# Patient Record
Sex: MALE | Race: ASIAN | NOT HISPANIC OR LATINO | Employment: OTHER | URBAN - METROPOLITAN AREA
[De-identification: names, ages, dates, MRNs, and addresses within clinical notes are randomized per-mention and may not be internally consistent; named-entity substitution may affect disease eponyms.]

---

## 2017-01-01 ENCOUNTER — OFFICE VISIT - HEALTHEAST (OUTPATIENT)
Dept: PULMONOLOGY | Facility: OTHER | Age: 82
End: 2017-01-01

## 2017-01-01 ENCOUNTER — COMMUNICATION - HEALTHEAST (OUTPATIENT)
Dept: NURSING | Facility: CLINIC | Age: 82
End: 2017-01-01

## 2017-01-01 ENCOUNTER — RECORDS - HEALTHEAST (OUTPATIENT)
Dept: ADMINISTRATIVE | Facility: OTHER | Age: 82
End: 2017-01-01

## 2017-01-01 ENCOUNTER — RECORDS - HEALTHEAST (OUTPATIENT)
Dept: GENERAL RADIOLOGY | Facility: CLINIC | Age: 82
End: 2017-01-01

## 2017-01-01 ENCOUNTER — OFFICE VISIT - HEALTHEAST (OUTPATIENT)
Dept: FAMILY MEDICINE | Facility: CLINIC | Age: 82
End: 2017-01-01

## 2017-01-01 ENCOUNTER — HOSPITAL ENCOUNTER (OUTPATIENT)
Dept: NUCLEAR MEDICINE | Facility: HOSPITAL | Age: 82
Discharge: HOME | End: 2017-08-17
Attending: INTERNAL MEDICINE

## 2017-01-01 ENCOUNTER — COMMUNICATION - HEALTHEAST (OUTPATIENT)
Dept: CARDIOLOGY | Facility: CLINIC | Age: 82
End: 2017-01-01

## 2017-01-01 ENCOUNTER — AMBULATORY - HEALTHEAST (OUTPATIENT)
Dept: FAMILY MEDICINE | Facility: CLINIC | Age: 82
End: 2017-01-01

## 2017-01-01 ENCOUNTER — COMMUNICATION - HEALTHEAST (OUTPATIENT)
Dept: FAMILY MEDICINE | Facility: CLINIC | Age: 82
End: 2017-01-01

## 2017-01-01 ENCOUNTER — OFFICE VISIT (OUTPATIENT)
Dept: OPHTHALMOLOGY | Facility: CLINIC | Age: 82
End: 2017-01-01
Attending: OPHTHALMOLOGY
Payer: COMMERCIAL

## 2017-01-01 ENCOUNTER — TRANSFERRED RECORDS (OUTPATIENT)
Dept: HEALTH INFORMATION MANAGEMENT | Facility: CLINIC | Age: 82
End: 2017-01-01

## 2017-01-01 ENCOUNTER — HOSPITAL ENCOUNTER (OUTPATIENT)
Dept: CARDIOLOGY | Facility: HOSPITAL | Age: 82
Discharge: HOME | End: 2017-08-17
Attending: INTERNAL MEDICINE

## 2017-01-01 ENCOUNTER — OFFICE VISIT - HEALTHEAST (OUTPATIENT)
Dept: CARDIOLOGY | Facility: CLINIC | Age: 82
End: 2017-01-01

## 2017-01-01 ENCOUNTER — AMBULATORY - HEALTHEAST (OUTPATIENT)
Dept: NURSING | Facility: CLINIC | Age: 82
End: 2017-01-01

## 2017-01-01 DIAGNOSIS — Z94.7 PENETRATING KERATOPLASTY GRAFT IN PLACE: ICD-10-CM

## 2017-01-01 DIAGNOSIS — R10.9 FLANK PAIN: ICD-10-CM

## 2017-01-01 DIAGNOSIS — Z09 HOSPITAL DISCHARGE FOLLOW-UP: ICD-10-CM

## 2017-01-01 DIAGNOSIS — I25.118 CORONARY ARTERY DISEASE OF NATIVE ARTERY OF NATIVE HEART WITH STABLE ANGINA PECTORIS (H): ICD-10-CM

## 2017-01-01 DIAGNOSIS — J44.9 COPD (CHRONIC OBSTRUCTIVE PULMONARY DISEASE) (H): ICD-10-CM

## 2017-01-01 DIAGNOSIS — H30.91 POSTERIOR UVEITIS, RIGHT EYE: ICD-10-CM

## 2017-01-01 DIAGNOSIS — E11.21 TYPE 2 DIABETES MELLITUS WITH DIABETIC NEPHROPATHY, WITHOUT LONG-TERM CURRENT USE OF INSULIN (H): ICD-10-CM

## 2017-01-01 DIAGNOSIS — D17.9 LIPOMA: ICD-10-CM

## 2017-01-01 DIAGNOSIS — C22.0 HCC (HEPATOCELLULAR CARCINOMA) (H): ICD-10-CM

## 2017-01-01 DIAGNOSIS — M54.50 LOW BACK PAIN: ICD-10-CM

## 2017-01-01 DIAGNOSIS — D64.9 ANEMIA: ICD-10-CM

## 2017-01-01 DIAGNOSIS — K21.9 GERD (GASTROESOPHAGEAL REFLUX DISEASE): ICD-10-CM

## 2017-01-01 DIAGNOSIS — M1A.0290 CHRONIC GOUT OF ELBOW, UNSPECIFIED CAUSE, UNSPECIFIED LATERALITY: ICD-10-CM

## 2017-01-01 DIAGNOSIS — J84.10 PULMONARY FIBROSIS (H): ICD-10-CM

## 2017-01-01 DIAGNOSIS — J44.89 ASTHMA-COPD OVERLAP SYNDROME (H): ICD-10-CM

## 2017-01-01 DIAGNOSIS — Z01.818 PREOP EXAMINATION: ICD-10-CM

## 2017-01-01 DIAGNOSIS — Z00.00 HEALTHCARE MAINTENANCE: ICD-10-CM

## 2017-01-01 DIAGNOSIS — E78.5 HYPERLIPIDEMIA: ICD-10-CM

## 2017-01-01 DIAGNOSIS — Z94.7 H/O CORNEA TRANSPLANT: ICD-10-CM

## 2017-01-01 DIAGNOSIS — Z94.7 H/O CORNEA TRANSPLANT: Primary | ICD-10-CM

## 2017-01-01 DIAGNOSIS — Z87.09 HISTORY OF COPD: ICD-10-CM

## 2017-01-01 DIAGNOSIS — R10.9 UNSPECIFIED ABDOMINAL PAIN: ICD-10-CM

## 2017-01-01 DIAGNOSIS — I25.10 CORONARY ARTERY DISEASE: ICD-10-CM

## 2017-01-01 DIAGNOSIS — M1A.9XX1 GOUT TOPHI: ICD-10-CM

## 2017-01-01 DIAGNOSIS — R00.1 BRADYCARDIA: ICD-10-CM

## 2017-01-01 DIAGNOSIS — E03.9 HYPOTHYROID: ICD-10-CM

## 2017-01-01 DIAGNOSIS — Z96.1 PSEUDOPHAKIA: ICD-10-CM

## 2017-01-01 DIAGNOSIS — J93.83 SPONTANEOUS PNEUMOTHORAX: ICD-10-CM

## 2017-01-01 DIAGNOSIS — I25.10 ATHEROSCLEROSIS OF NATIVE CORONARY ARTERY OF NATIVE HEART WITHOUT ANGINA PECTORIS: ICD-10-CM

## 2017-01-01 DIAGNOSIS — J32.9 CHRONIC RHINOSINUSITIS: ICD-10-CM

## 2017-01-01 DIAGNOSIS — Z96.1 IOL PRESENT IN ANTERIOR CHAMBER: ICD-10-CM

## 2017-01-01 DIAGNOSIS — N18.32 CKD STAGE G3B/A1, GFR 30-44 AND ALBUMIN CREATININE RATIO <30 MG/G (H): ICD-10-CM

## 2017-01-01 DIAGNOSIS — E03.4 HYPOTHYROIDISM DUE TO ACQUIRED ATROPHY OF THYROID: ICD-10-CM

## 2017-01-01 DIAGNOSIS — B19.10 CIRRHOSIS OF LIVER DUE TO HEPATITIS B (H): ICD-10-CM

## 2017-01-01 DIAGNOSIS — K74.60 CIRRHOSIS OF LIVER DUE TO HEPATITIS B (H): ICD-10-CM

## 2017-01-01 DIAGNOSIS — H54.7 DECREASED VISION: ICD-10-CM

## 2017-01-01 DIAGNOSIS — E78.00 HYPERCHOLESTEREMIA: ICD-10-CM

## 2017-01-01 DIAGNOSIS — Z94.7 PENETRATING KERATOPLASTY GRAFT IN PLACE: Primary | ICD-10-CM

## 2017-01-01 DIAGNOSIS — Z71.89 COUNSELING AND COORDINATION OF CARE: ICD-10-CM

## 2017-01-01 DIAGNOSIS — F32.A DEPRESSION, UNSPECIFIED DEPRESSION TYPE: ICD-10-CM

## 2017-01-01 DIAGNOSIS — J45.21 MILD INTERMITTENT ASTHMA WITH ACUTE EXACERBATION: ICD-10-CM

## 2017-01-01 LAB
ALT SERPL W P-5'-P-CCNC: 19 U/L (ref 0–45)
ATRIAL RATE - MUSE: 43 BPM
CHOLEST SERPL-MCNC: 79 MG/DL
CV STRESS CURRENT BP HE: NORMAL
CV STRESS CURRENT HR HE: 61
CV STRESS CURRENT HR HE: 62
CV STRESS CURRENT HR HE: 62
CV STRESS CURRENT HR HE: 63
CV STRESS CURRENT HR HE: 64
CV STRESS CURRENT HR HE: 64
CV STRESS CURRENT HR HE: 65
CV STRESS CURRENT HR HE: 67
CV STRESS CURRENT HR HE: 68
CV STRESS CURRENT HR HE: 68
CV STRESS CURRENT HR HE: 69
CV STRESS CURRENT HR HE: 69
CV STRESS CURRENT HR HE: 70
CV STRESS CURRENT HR HE: 72
CV STRESS CURRENT HR HE: 74
CV STRESS CURRENT HR HE: 74
CV STRESS DEVIATION TIME HE: NORMAL
CV STRESS ECHO PERCENT HR HE: NORMAL
CV STRESS EXERCISE STAGE HE: NORMAL
CV STRESS FINAL RESTING BP HE: NORMAL
CV STRESS FINAL RESTING HR HE: 64
CV STRESS MAX HR HE: 75
CV STRESS MAX TREADMILL GRADE HE: 0
CV STRESS MAX TREADMILL SPEED HE: 0
CV STRESS PEAK DIA BP HE: NORMAL
CV STRESS PEAK SYS BP HE: NORMAL
CV STRESS PHASE HE: NORMAL
CV STRESS PROTOCOL HE: NORMAL
CV STRESS RESTING PT POSITION HE: NORMAL
CV STRESS ST DEVIATION AMOUNT HE: NORMAL
CV STRESS ST DEVIATION ELEVATION HE: NORMAL
CV STRESS ST EVELATION AMOUNT HE: NORMAL
CV STRESS TEST TYPE HE: NORMAL
CV STRESS TOTAL STAGE TIME MIN 1 HE: NORMAL
DIASTOLIC BLOOD PRESSURE - MUSE: NORMAL MMHG
FASTING STATUS PATIENT QL REPORTED: YES
HBA1C MFR BLD: 6.2 % (ref 3.5–6)
HBA1C MFR BLD: 6.2 % (ref 3.5–6)
HDLC SERPL-MCNC: 36 MG/DL
INTERPRETATION ECG - MUSE: NORMAL
LDLC SERPL CALC-MCNC: 42 MG/DL
NUC STRESS EJECTION FRACTION: 52 %
P AXIS - MUSE: 24 DEGREES
PR INTERVAL - MUSE: 136 MS
QRS DURATION - MUSE: 94 MS
QT - MUSE: 502 MS
QTC - MUSE: 424 MS
R AXIS - MUSE: 62 DEGREES
STRESS ECHO BASELINE BP: NORMAL
STRESS ECHO BASELINE HR: 56
STRESS ECHO CALCULATED PERCENT HR: 55 %
STRESS ECHO LAST STRESS BP: NORMAL
STRESS ECHO LAST STRESS HR: 65
SYSTOLIC BLOOD PRESSURE - MUSE: NORMAL MMHG
T AXIS - MUSE: 93 DEGREES
VENTRICULAR RATE- MUSE: 43 BPM

## 2017-01-01 PROCEDURE — 99212 OFFICE O/P EST SF 10 MIN: CPT | Mod: ZF

## 2017-01-01 PROCEDURE — 99213 OFFICE O/P EST LOW 20 MIN: CPT | Mod: ZF

## 2017-01-01 PROCEDURE — 92134 CPTRZ OPH DX IMG PST SGM RTA: CPT | Mod: ZF | Performed by: OPHTHALMOLOGY

## 2017-01-01 PROCEDURE — 92133 CPTRZD OPH DX IMG PST SGM ON: CPT | Mod: ZF | Performed by: OPHTHALMOLOGY

## 2017-01-01 PROCEDURE — T1013 SIGN LANG/ORAL INTERPRETER: HCPCS | Mod: U3,ZF

## 2017-01-01 PROCEDURE — 99214 OFFICE O/P EST MOD 30 MIN: CPT | Mod: 25,ZF

## 2017-01-01 RX ORDER — PREDNISOLONE ACETATE 10 MG/ML
1 SUSPENSION/ DROPS OPHTHALMIC 4 TIMES DAILY
Qty: 1 BOTTLE | Refills: 11 | Status: SHIPPED | OUTPATIENT
Start: 2017-01-01 | End: 2018-01-01

## 2017-01-01 ASSESSMENT — EXTERNAL EXAM - RIGHT EYE
OD_EXAM: NORMAL

## 2017-01-01 ASSESSMENT — TONOMETRY
OD_IOP_MMHG: 09
IOP_METHOD: ICARE
IOP_METHOD: ICARE
OS_IOP_MMHG: 13
OD_IOP_MMHG: 05
OS_IOP_MMHG: 16
IOP_METHOD: TONOPEN
IOP_METHOD: ICARE
OS_IOP_MMHG: 12
OD_IOP_MMHG: 09
OS_IOP_MMHG: 11
OD_IOP_MMHG: 16

## 2017-01-01 ASSESSMENT — VISUAL ACUITY
METHOD: SNELLEN - LINEAR
OS_PH_SC: 20/60
OS_PH_SC: 20/60
OD_SC: CF @ 2'
METHOD: SNELLEN - LINEAR
OS_SC: 20/60
OD_SC: CF @ 2'
OS_SC: 20/80
OD_SC: 20/500
METHOD: SNELLEN - LINEAR
OD_PH_SC: 20/200
OD_PH_SC: 20/150
OS_PH_SC: 20/40
OS_PH_SC: 20/40
OS_SC: 20/70
METHOD: SNELLEN - LINEAR
OD_PH_SC: 20/300
OS_SC: 20/80
OD_SC: CF @ 3'

## 2017-01-01 ASSESSMENT — PACHYMETRY
OD_CT(UM): 613
OS_CT(UM): 557

## 2017-01-01 ASSESSMENT — CONF VISUAL FIELD
OD_SUPERIOR_TEMPORAL_RESTRICTION: 3
OD_INFERIOR_TEMPORAL_RESTRICTION: 3
OD_INFERIOR_NASAL_RESTRICTION: 3
OS_SUPERIOR_NASAL_RESTRICTION: 3
OD_INFERIOR_NASAL_RESTRICTION: 3
OS_INFERIOR_TEMPORAL_RESTRICTION: 3
OS_SUPERIOR_TEMPORAL_RESTRICTION: 3
OD_SUPERIOR_NASAL_RESTRICTION: 3
OD_INFERIOR_TEMPORAL_RESTRICTION: 3
OS_SUPERIOR_TEMPORAL_RESTRICTION: 3
OS_SUPERIOR_NASAL_RESTRICTION: 3
OD_SUPERIOR_TEMPORAL_RESTRICTION: 3
OS_INFERIOR_NASAL_RESTRICTION: 3
OD_SUPERIOR_NASAL_RESTRICTION: 3
OS_INFERIOR_TEMPORAL_RESTRICTION: 3
OS_INFERIOR_NASAL_RESTRICTION: 3

## 2017-01-01 ASSESSMENT — REFRACTION_WEARINGRX
OS_AXIS: 175
OS_CYLINDER: +1.00
OD_AXIS: 040
OD_CYLINDER: +1.25
OS_SPHERE: +1.00
OD_SPHERE: -2.50

## 2017-01-01 ASSESSMENT — EXTERNAL EXAM - LEFT EYE
OS_EXAM: MILD BROW PTOSIS

## 2017-01-01 ASSESSMENT — MIFFLIN-ST. JEOR
SCORE: 998.78
SCORE: 985.17
SCORE: 1003.31

## 2017-01-06 ENCOUNTER — COMMUNICATION - HEALTHEAST (OUTPATIENT)
Dept: FAMILY MEDICINE | Facility: CLINIC | Age: 82
End: 2017-01-06

## 2017-01-06 DIAGNOSIS — J44.9 COPD (CHRONIC OBSTRUCTIVE PULMONARY DISEASE) (H): ICD-10-CM

## 2017-01-09 ENCOUNTER — OFFICE VISIT - HEALTHEAST (OUTPATIENT)
Dept: NURSING | Facility: CLINIC | Age: 82
End: 2017-01-09

## 2017-01-09 DIAGNOSIS — M10.9 GOUT: ICD-10-CM

## 2017-01-09 DIAGNOSIS — I50.22 CHRONIC SYSTOLIC HEART FAILURE (H): ICD-10-CM

## 2017-01-09 DIAGNOSIS — F32.A DEPRESSION, UNSPECIFIED DEPRESSION TYPE: ICD-10-CM

## 2017-01-09 DIAGNOSIS — J44.1 CHRONIC OBSTRUCTIVE PULMONARY DISEASE WITH ACUTE EXACERBATION (H): ICD-10-CM

## 2017-01-09 DIAGNOSIS — N18.30 CHRONIC KIDNEY DISEASE, STAGE III (MODERATE) (H): ICD-10-CM

## 2017-01-09 DIAGNOSIS — E03.9 HYPOTHYROIDISM, UNSPECIFIED TYPE: ICD-10-CM

## 2017-01-09 LAB
CREAT SERPL-MCNC: 1.68 MG/DL (ref 0.7–1.3)
GFR SERPL CREATININE-BSD FRML MDRD: 39 ML/MIN/1.73M2

## 2017-01-10 ENCOUNTER — COMMUNICATION - HEALTHEAST (OUTPATIENT)
Dept: FAMILY MEDICINE | Facility: CLINIC | Age: 82
End: 2017-01-10

## 2017-01-11 ENCOUNTER — AMBULATORY - HEALTHEAST (OUTPATIENT)
Dept: FAMILY MEDICINE | Facility: CLINIC | Age: 82
End: 2017-01-11

## 2017-01-11 DIAGNOSIS — E03.9 HYPOTHYROIDISM: ICD-10-CM

## 2017-01-18 ENCOUNTER — COMMUNICATION - HEALTHEAST (OUTPATIENT)
Dept: FAMILY MEDICINE | Facility: CLINIC | Age: 82
End: 2017-01-18

## 2017-01-25 ENCOUNTER — AMBULATORY - HEALTHEAST (OUTPATIENT)
Dept: LAB | Facility: CLINIC | Age: 82
End: 2017-01-25

## 2017-01-25 ENCOUNTER — AMBULATORY - HEALTHEAST (OUTPATIENT)
Dept: PHYSICAL MEDICINE AND REHAB | Facility: CLINIC | Age: 82
End: 2017-01-25

## 2017-01-25 DIAGNOSIS — E03.9 HYPOTHYROIDISM: ICD-10-CM

## 2017-01-30 ENCOUNTER — OFFICE VISIT - HEALTHEAST (OUTPATIENT)
Dept: NURSING | Facility: CLINIC | Age: 82
End: 2017-01-30

## 2017-01-30 DIAGNOSIS — E55.9 VITAMIN D DEFICIENCY: ICD-10-CM

## 2017-01-30 DIAGNOSIS — J44.1 CHRONIC OBSTRUCTIVE PULMONARY DISEASE WITH ACUTE EXACERBATION (H): ICD-10-CM

## 2017-01-30 DIAGNOSIS — K21.9 GASTROESOPHAGEAL REFLUX DISEASE WITHOUT ESOPHAGITIS: ICD-10-CM

## 2017-01-30 DIAGNOSIS — I50.22 CHRONIC SYSTOLIC HEART FAILURE (H): ICD-10-CM

## 2017-01-30 DIAGNOSIS — N18.30 CHRONIC KIDNEY DISEASE, STAGE III (MODERATE) (H): ICD-10-CM

## 2017-01-30 DIAGNOSIS — M10.9 GOUT: ICD-10-CM

## 2017-02-22 ENCOUNTER — RECORDS - HEALTHEAST (OUTPATIENT)
Dept: ADMINISTRATIVE | Facility: OTHER | Age: 82
End: 2017-02-22

## 2017-02-24 ENCOUNTER — COMMUNICATION - HEALTHEAST (OUTPATIENT)
Dept: FAMILY MEDICINE | Facility: CLINIC | Age: 82
End: 2017-02-24

## 2017-03-09 ENCOUNTER — COMMUNICATION - HEALTHEAST (OUTPATIENT)
Dept: FAMILY MEDICINE | Facility: CLINIC | Age: 82
End: 2017-03-09

## 2017-03-09 DIAGNOSIS — I10 HTN (HYPERTENSION): ICD-10-CM

## 2017-03-13 ENCOUNTER — RECORDS - HEALTHEAST (OUTPATIENT)
Dept: ADMINISTRATIVE | Facility: OTHER | Age: 82
End: 2017-03-13

## 2017-03-15 ENCOUNTER — COMMUNICATION - HEALTHEAST (OUTPATIENT)
Dept: FAMILY MEDICINE | Facility: CLINIC | Age: 82
End: 2017-03-15

## 2017-03-24 ENCOUNTER — OFFICE VISIT - HEALTHEAST (OUTPATIENT)
Dept: NURSING | Facility: CLINIC | Age: 82
End: 2017-03-24

## 2017-03-24 DIAGNOSIS — M10.9 GOUT: ICD-10-CM

## 2017-03-24 DIAGNOSIS — K74.60 CIRRHOSIS OF LIVER DUE TO HEPATITIS B (H): ICD-10-CM

## 2017-03-24 DIAGNOSIS — B19.10 CIRRHOSIS OF LIVER DUE TO HEPATITIS B (H): ICD-10-CM

## 2017-03-24 DIAGNOSIS — M54.2 CERVICALGIA: ICD-10-CM

## 2017-03-24 DIAGNOSIS — I50.22 CHRONIC SYSTOLIC HEART FAILURE (H): ICD-10-CM

## 2017-03-24 DIAGNOSIS — J44.1 CHRONIC OBSTRUCTIVE PULMONARY DISEASE WITH ACUTE EXACERBATION (H): ICD-10-CM

## 2017-03-24 DIAGNOSIS — R33.9 URINARY RETENTION: ICD-10-CM

## 2017-03-27 ENCOUNTER — COMMUNICATION - HEALTHEAST (OUTPATIENT)
Dept: NURSING | Facility: CLINIC | Age: 82
End: 2017-03-27

## 2017-03-27 DIAGNOSIS — M10.00 IDIOPATHIC GOUT, UNSPECIFIED CHRONICITY, UNSPECIFIED SITE: ICD-10-CM

## 2017-03-31 ENCOUNTER — COMMUNICATION - HEALTHEAST (OUTPATIENT)
Dept: NURSING | Facility: CLINIC | Age: 82
End: 2017-03-31

## 2017-04-02 ENCOUNTER — COMMUNICATION - HEALTHEAST (OUTPATIENT)
Dept: FAMILY MEDICINE | Facility: CLINIC | Age: 82
End: 2017-04-02

## 2017-04-04 ENCOUNTER — COMMUNICATION - HEALTHEAST (OUTPATIENT)
Dept: FAMILY MEDICINE | Facility: CLINIC | Age: 82
End: 2017-04-04

## 2017-04-04 ENCOUNTER — OFFICE VISIT (OUTPATIENT)
Dept: OPHTHALMOLOGY | Facility: CLINIC | Age: 82
End: 2017-04-04
Attending: OPHTHALMOLOGY
Payer: COMMERCIAL

## 2017-04-04 DIAGNOSIS — H54.7 DECREASED VISION: Primary | ICD-10-CM

## 2017-04-04 DIAGNOSIS — R52 PAIN: ICD-10-CM

## 2017-04-04 PROCEDURE — 92025 CPTRIZED CORNEAL TOPOGRAPHY: CPT | Mod: ZF | Performed by: OPHTHALMOLOGY

## 2017-04-04 PROCEDURE — 99212 OFFICE O/P EST SF 10 MIN: CPT | Mod: ZF

## 2017-04-04 PROCEDURE — 92015 DETERMINE REFRACTIVE STATE: CPT | Mod: ZF

## 2017-04-04 ASSESSMENT — VISUAL ACUITY
OD_SC: CF @ 3'
OS_PH_CC: 20/60
OS_SC: 20/150
METHOD: SNELLEN - LINEAR

## 2017-04-04 ASSESSMENT — CONF VISUAL FIELD
OS_SUPERIOR_TEMPORAL_RESTRICTION: 3
OS_INFERIOR_TEMPORAL_RESTRICTION: 3
OS_SUPERIOR_NASAL_RESTRICTION: 3
OD_INFERIOR_TEMPORAL_RESTRICTION: 3
OD_SUPERIOR_NASAL_RESTRICTION: 3
OD_INFERIOR_NASAL_RESTRICTION: 3
OD_SUPERIOR_TEMPORAL_RESTRICTION: 3
OS_INFERIOR_NASAL_RESTRICTION: 3

## 2017-04-04 ASSESSMENT — TONOMETRY
OS_IOP_MMHG: 10
IOP_METHOD: ICARE
OD_IOP_MMHG: 10

## 2017-04-04 ASSESSMENT — REFRACTION_MANIFEST
OS_ADD: +3.00
OS_AXIS: 020
OS_SPHERE: +1.25
OS_CYLINDER: +1.25

## 2017-04-04 ASSESSMENT — EXTERNAL EXAM - RIGHT EYE: OD_EXAM: NORMAL

## 2017-04-04 ASSESSMENT — EXTERNAL EXAM - LEFT EYE: OS_EXAM: MILD BROW PTOSIS

## 2017-04-04 NOTE — NURSING NOTE
Chief Complaints and History of Present Illnesses   Patient presents with     Follow Up For     s/p Corneal transplant rejection (Primary Dx);      HPI    Affected eye(s):  Both   Symptoms:     Blurred vision   Decreased vision   Floaters   No flashes   Foreign body sensation   Dryness      Duration:  7 months   Frequency:  Constant       Do you have eye pain now?:  No      Comments:  Pt. Stated decreased vision both eyes over the last 7 months.  Timolol last used 5:00 PM   A1C  ?  BS: 150-200      Heron Frausto  10:48 AM April 4, 2017

## 2017-04-04 NOTE — MR AVS SNAPSHOT
After Visit Summary   2017    Evelio Odell    MRN: [id number]           Patient Information     Date Of Birth          2/15/1934        Visit Information        Provider Department      2017 10:15 AM Endy Harkins MD; [facility]        Today's Diagnoses     Decreased vision    -  1       Follow-ups after your visit        Your next 10 appointments already scheduled     May 02, 2017  9:45 AM CDT   RETURN CORNEA with Endy Harkins MD   Eye Clinic ([facility])    [address]  [address]  [address] 9[address]  [facility] [address]   [phone]              Future tests that were ordered for you today     Open Future Orders        Priority Expected Expires Ordered    OCT Retina Spectralis OU (both eyes) Routine  10/4/2018 2017            Who to contact     Please call your clinic at [phone] to:    Ask questions about your health    Make or cancel appointments    Discuss your medicines    Learn about your test results    Speak to your doctor   If you have compliments or concerns about an experience at your clinic, or if you wish to file a complaint, please contact [facility] Patient Relations at [phone] or email us at [email or web address]         Additional Information About Your Visit        MyChart Information     SparkWordst is an electronic gateway that provides easy, online access to your medical records. With Ryzing, you can request a clinic appointment, read your test results, renew a prescription or communicate with your care team.     To sign up for SparkWordst visit the website at [email or web address]   You will be asked to enter the access code listed below, as well as some personal information. Please follow the directions to create your username and password.     Your access code is: [email or web address]  Expires: 2017 12:54 PM     Your access code will  in 90 days.  If you need help or a new code, please contact your [facility] or call [phone] for assistance.        Care EveryWhere ID     This is your Care EveryWhere ID. This could be used by other organizations to access your [place] medical records  [id number]         Blood Pressure from Last 3 Encounters:   02/19/14 154/75   10/01/13 (!) 151/92    Weight from Last 3 Encounters:   02/19/14 56.7 kg (125 lb)   10/01/13 59 kg (130 lb)              We Performed the Following     Corneal Topography OU (both eyes)        Primary Care Provider Office Phone # Fax #    [facility] [phone]       [address] [place] [address]        Thank you!     Thank you for choosing EYE CLINIC  for your care. Our goal is always to provide you with excellent care. Hearing back from our patients is one way we can continue to improve our services. Please take a few minutes to complete the written survey that you may receive in the mail after your visit with us. Thank you!             Your Updated Medication List - Protect others around you: Learn how to safely use, store and throw away your medicines at www.disposemymeds.org.          This list is accurate as of: 4/4/17  1:11 PM.  Always use your most recent med list.                   Brand Name Dispense Instructions for use    albuterol 108 (90 BASE) MCG/ACT Inhaler    PROAIR HFA/PROVENTIL HFA/VENTOLIN HFA     Inhale 2 puffs into the lungs every 4 hours as needed       artificial tears Oint ophthalmic ointment     1 Tube    Place 1 g into both eyes At Bedtime       brimonidine 0.2 % ophthalmic solution    ALPHAGAN    1 Bottle    Place 1 drop into the right eye 2 times daily       budesonide-formoterol 80-4.5 MCG/ACT Inhaler    SYMBICORT     Inhale 2 puffs into the lungs 2 times daily       COSOPT OP          dorzolamide 2 % ophthalmic solution    TRUSOPT    1 Bottle    Place 1 drop into the right eye 2 times daily        flunisolide 25 MCG/ACT (0.025%) Soln spray    NASALIDE    1 Bottle    2 sprays by Both Nostrils route every 12 hours.       FUROSEMIDE PO      Take 40 mg by mouth daily       HYDROcodone-acetaminophen 5-325 MG per tablet    NORCO    10 tablet    Take 1 tablet by mouth every 6 hours as needed for pain Maximum of 4000 mg of acetaminophen in 24 hours.       LEVOTHYROXINE SODIUM PO      Take 50 mcg by mouth daily       LISINOPRIL PO      Take 10 mg by mouth daily       metoprolol 25 MG tablet    LOPRESSOR     Take 25 mg by mouth daily       NITROSTAT SL      Place 0.4 mg under the tongue every 5 minutes as needed       OMEPRAZOLE PO      Take 40 mg by mouth every morning       polyvinyl alcohol 1.4 % ophthalmic solution    LIQUITEARS    15 mL    Place 1 drop into both eyes 4 times daily       prednisoLONE acetate 1 % ophthalmic susp    PRED FORTE    1 Bottle    Place 1 drop into the right eye 2 times daily       tenofovir 300 MG tablet    VIREAD     Take 300 mg by mouth daily       UNKNOWN TO PATIENT      Breathing, gout, high blood pressure medications

## 2017-04-04 NOTE — PROGRESS NOTES
CC: Follow-up PKP, Right Eye     HPI: Patient with hx of previous PKP OD and ACIOL. 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 S/P PKP, Right Eye     - history of presumed keratitis with severe ulceration with subsequent PKP 2013   - subsequent ACIOL 2014     - active rejection noted 6/14 was increased to PredForte Q1HR and started on Cosopt for increased intraocular pressure   - edema / keratitic precipitates / stromal edema continue to have resolved    Acuity is still decreased  galilea stable   Graft mild edema    rec increase Predforte  Frequency  Return to clinic 2-3 weeks, reassess    May need repeat penetrating keratoplasty (PK)      ~~~~~~~~~~~~~~~~~~~~~~~~~~~~~~~~~~~~~~~~~~~~~~~~~~~~~~~~~~~~~~~~    Complete documentation of historical and exam elements from today's encounter can be found in the full encounter summary report (not reduplicated in this progress note). I personally obtained the chief complaint(s) and history of present illness.  I confirmed and edited as necessary the review of systems, past medical/surgical history, family history, social history, and examination findings as documented by others.  I examined the patient myself, and I personally reviewed the relevant tests, images, and reports as documented above. I formulated and edited as necessary the assessment and plan and discussed the  findings and management plan with the patient and family.     Endy Harkins MD, MA  Director, Cornea & Anterior Segment  [facility] Department of Ophthalmology & Visual Neuroscience

## 2017-04-05 ENCOUNTER — RECORDS - HEALTHEAST (OUTPATIENT)
Dept: ADMINISTRATIVE | Facility: OTHER | Age: 82
End: 2017-04-05

## 2017-04-05 ENCOUNTER — TELEPHONE (OUTPATIENT)
Dept: OPHTHALMOLOGY | Facility: CLINIC | Age: 82
End: 2017-04-05

## 2017-04-05 DIAGNOSIS — Z94.7 PENETRATING KERATOPLASTY GRAFT IN PLACE: ICD-10-CM

## 2017-04-05 RX ORDER — DORZOLAMIDE HYDROCHLORIDE AND TIMOLOL MALEATE 20; 5 MG/ML; MG/ML
1 SOLUTION/ DROPS OPHTHALMIC 2 TIMES DAILY
Qty: 1 BOTTLE | Refills: 11 | Status: SHIPPED | OUTPATIENT
Start: 2017-04-05

## 2017-04-05 RX ORDER — PREDNISOLONE ACETATE 10 MG/ML
1 SUSPENSION/ DROPS OPHTHALMIC 2 TIMES DAILY
Qty: 1 BOTTLE | Refills: 11 | Status: SHIPPED | OUTPATIENT
Start: 2017-04-05 | End: 2017-01-01

## 2017-04-05 NOTE — TELEPHONE ENCOUNTER
[facility]UN Pharmacists; she is requesting for refills that were to be sent yesterday from visit with Dr Harkins. Prednisolone and Cosopt were to be ordered and patient as been there this morning to get the drops from her pharmacy in [place].    I have placed a message to Dr Harkins and fellow Silas to have them ordered for patient.    Carrie De León COA 1:06 PM April 5, 2017

## 2017-04-06 ENCOUNTER — COMMUNICATION - HEALTHEAST (OUTPATIENT)
Dept: FAMILY MEDICINE | Facility: CLINIC | Age: 82
End: 2017-04-06

## 2017-04-06 DIAGNOSIS — R52 PAIN: ICD-10-CM

## 2017-04-07 ENCOUNTER — COMMUNICATION - HEALTHEAST (OUTPATIENT)
Dept: PHYSICAL MEDICINE AND REHAB | Facility: CLINIC | Age: 82
End: 2017-04-07

## 2017-04-07 DIAGNOSIS — M54.2 NECK PAIN ON RIGHT SIDE: ICD-10-CM

## 2017-04-07 DIAGNOSIS — M48.02 CERVICAL STENOSIS OF SPINE: ICD-10-CM

## 2017-04-10 ENCOUNTER — RECORDS - HEALTHEAST (OUTPATIENT)
Dept: ADMINISTRATIVE | Facility: OTHER | Age: 82
End: 2017-04-10

## 2017-04-14 ENCOUNTER — COMMUNICATION - HEALTHEAST (OUTPATIENT)
Dept: FAMILY MEDICINE | Facility: CLINIC | Age: 82
End: 2017-04-14

## 2017-04-24 ENCOUNTER — OFFICE VISIT - HEALTHEAST (OUTPATIENT)
Dept: FAMILY MEDICINE | Facility: CLINIC | Age: 82
End: 2017-04-24

## 2017-04-24 DIAGNOSIS — J18.9 CAP (COMMUNITY ACQUIRED PNEUMONIA): ICD-10-CM

## 2017-04-24 DIAGNOSIS — J44.1 CHRONIC OBSTRUCTIVE PULMONARY DISEASE WITH ACUTE EXACERBATION (H): ICD-10-CM

## 2017-04-24 DIAGNOSIS — I25.10 ATHEROSCLEROSIS OF NATIVE CORONARY ARTERY OF NATIVE HEART WITHOUT ANGINA PECTORIS: ICD-10-CM

## 2017-04-24 DIAGNOSIS — Z71.89 ADVANCED DIRECTIVES, COUNSELING/DISCUSSION: ICD-10-CM

## 2017-04-24 DIAGNOSIS — N18.30 CHRONIC KIDNEY DISEASE, STAGE III (MODERATE) (H): ICD-10-CM

## 2017-04-24 ASSESSMENT — MIFFLIN-ST. JEOR: SCORE: 1025.99

## 2017-04-26 ENCOUNTER — OFFICE VISIT - HEALTHEAST (OUTPATIENT)
Dept: NURSING | Facility: CLINIC | Age: 82
End: 2017-04-26

## 2017-04-26 DIAGNOSIS — J44.1 CHRONIC OBSTRUCTIVE PULMONARY DISEASE WITH ACUTE EXACERBATION (H): ICD-10-CM

## 2017-04-26 DIAGNOSIS — I50.22 CHRONIC SYSTOLIC HEART FAILURE (H): ICD-10-CM

## 2017-04-26 DIAGNOSIS — K74.60 CIRRHOSIS OF LIVER DUE TO HEPATITIS B (H): ICD-10-CM

## 2017-04-26 DIAGNOSIS — I95.9 HYPOTENSION, UNSPECIFIED HYPOTENSION TYPE: ICD-10-CM

## 2017-04-26 DIAGNOSIS — J18.9 CAP (COMMUNITY ACQUIRED PNEUMONIA): ICD-10-CM

## 2017-04-26 DIAGNOSIS — B19.10 CIRRHOSIS OF LIVER DUE TO HEPATITIS B (H): ICD-10-CM

## 2017-05-02 ENCOUNTER — OFFICE VISIT (OUTPATIENT)
Dept: OPHTHALMOLOGY | Facility: CLINIC | Age: 82
End: 2017-05-02
Attending: OPHTHALMOLOGY
Payer: COMMERCIAL

## 2017-05-02 DIAGNOSIS — T86.8419 FAILED CORNEAL TRANSPLANT: ICD-10-CM

## 2017-05-02 DIAGNOSIS — Z96.1 PSEUDOPHAKIA: ICD-10-CM

## 2017-05-02 DIAGNOSIS — Z94.7 CORNEA REPLACED BY TRANSPLANT: Primary | ICD-10-CM

## 2017-05-02 DIAGNOSIS — H54.7 DECREASED VISION: ICD-10-CM

## 2017-05-02 PROCEDURE — 92134 CPTRZ OPH DX IMG PST SGM RTA: CPT | Mod: ZF | Performed by: OPHTHALMOLOGY

## 2017-05-02 PROCEDURE — 92015 DETERMINE REFRACTIVE STATE: CPT | Mod: 52,ZF

## 2017-05-02 PROCEDURE — 99212 OFFICE O/P EST SF 10 MIN: CPT | Mod: ZF

## 2017-05-02 ASSESSMENT — CONF VISUAL FIELD
OS_SUPERIOR_NASAL_RESTRICTION: 3
OS_INFERIOR_NASAL_RESTRICTION: 3
OD_SUPERIOR_NASAL_RESTRICTION: 3
OD_SUPERIOR_TEMPORAL_RESTRICTION: 3
OD_INFERIOR_NASAL_RESTRICTION: 3
OS_INFERIOR_TEMPORAL_RESTRICTION: 3
OS_SUPERIOR_TEMPORAL_RESTRICTION: 3
OD_INFERIOR_TEMPORAL_RESTRICTION: 3

## 2017-05-02 ASSESSMENT — REFRACTION_WEARINGRX
OD_CYLINDER: +1.25
OD_SPHERE: -2.50
OD_AXIS: 040
OS_AXIS: 175
OS_SPHERE: +1.00
OS_CYLINDER: +1.00

## 2017-05-02 ASSESSMENT — EXTERNAL EXAM - RIGHT EYE: OD_EXAM: NORMAL

## 2017-05-02 ASSESSMENT — REFRACTION_MANIFEST
OS_CYLINDER: +1.00
OS_AXIS: 170
OD_SPHERE: PLANO
OS_SPHERE: +2.50
OS_ADD: +3.00
OD_ADD: +3.00

## 2017-05-02 ASSESSMENT — TONOMETRY
IOP_METHOD: ICARE
OS_IOP_MMHG: 10
OD_IOP_MMHG: 06

## 2017-05-02 ASSESSMENT — VISUAL ACUITY
METHOD: SNELLEN - LINEAR
OS_SC+: -3
OD_SC: 3'/200E
OS_SC: 20/50

## 2017-05-02 ASSESSMENT — EXTERNAL EXAM - LEFT EYE: OS_EXAM: MILD BROW PTOSIS

## 2017-05-02 NOTE — NURSING NOTE
Chief Complaints and History of Present Illnesses   Patient presents with     Follow Up For     PKP, Right Eye      HPI    Affected eye(s):  Right   Symptoms:        Duration:  1 month   Frequency:  Constant       Do you have eye pain now?:  No      Comments:  Pt. States no change in VA BE.  Occasional itching and FB sensation REAliya DAVID 10:07 AM May 2, 2017

## 2017-05-02 NOTE — PROGRESS NOTES
CC: Follow-up PKP, Right Eye     HPI: Patient with hx of previous PKP OD and ACIOL. Had repeat episode graft rejection last summer, resolved as of 9/16    Feels occasional itchiness and FBS OD, VA same.     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 S/P PKP, Right Eye     - history of presumed keratitis with severe ulceration with subsequent PKP 2013   - subsequent ACIOL 2014     - active rejection noted 6/14 was increased to PredForte Q1HR and started on Cosopt for increased intraocular pressure   - edema / keratitic precipitates / stromal edema continue to have resolved    Acuity is still decreased  galilea stable   Graft tr edema but overall anterior seg findings not sufficient to explain decreased visual acuity    Patient states nasal visual field in the right eye seems decreased  OCT normal but signal quality not great    Will seek retina opinion - retinal vascular issue? - consider fluorescein angiography etc     If posterior seg no significanat findings could consider repeat penetrating keratoplasty (PK)      ~~~~~~~~~~~~~~~~~~~~~~~~~~~~~~~~~~~~~~~~~~~~~~~~~~~~~~~~~~~~~~~~    Complete documentation of historical and exam elements from today's encounter can be found in the full encounter summary report (not reduplicated in this progress note). I personally obtained the chief complaint(s) and history of present illness.  I confirmed and edited as necessary the review of systems, past medical/surgical history, family history, social history, and examination findings as documented by others.  I examined the patient myself, and I personally reviewed the  relevant tests, images, and reports as documented above. I formulated and edited as necessary the assessment and plan and discussed the findings and management plan with the patient and family.     I personally interpreted the diagnostic / imaging study and have edited the interpretation as needed.    Endy Harkins MD, MA  Director, Cornea & Anterior Segment  [facility] Department of Ophthalmology & Visual Neuroscience

## 2017-05-02 NOTE — MR AVS SNAPSHOT
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 [address]   [phone]            Oct 19, 2017  3:30 PM CDT   Post-Op with Endy Harkins MD   Eye Clinic ([facility])    [address]  [address]   [address] Clin [address]a  [facility] [address] [phone]            Nov 14, 2017  2:30 PM CST   Post-Op with Endy Harkins MD   Eye Clinic ([facility])    [address]  [address]   [address] [address]  [facility] [address] [phone]              Who to contact     Please call your clinic at [phone] to:    Ask questions about your health    Make or cancel appointments    Discuss your medicines    Learn about your test results    Speak to your doctor   If you have compliments or concerns about an experience at your clinic, or if you wish to file a complaint, please contact [facility] Patient Relations at [phone] or email us at [email or web address]         Additional Information About Your Visit        MyChart Information     Spendji is an electronic gateway that provides easy, online access to your medical records. With Spendji, you can request a clinic appointment, read your test results, renew a prescription or communicate with your care team.     To sign  up for [email or web address] visit the website at [email or web address]   You will be asked to enter the access code listed below, as well as some personal information. Please follow the directions to create your username and password.     Your access code is: [email or web address]  Expires: 2017  6:31 AM     Your access code will  in 90 days. If you need help or a new code, please contact your [facility] or call [phone] for assistance.        Care EveryWhere ID     This is your Care EveryWhere ID. This could be used by other organizations to access your [place] medical records  [id number]         Blood Pressure from Last 3 Encounters:   14 154/75   10/01/13 (!) 151/92    Weight from Last 3 Encounters:   14 56.7 kg (125 lb)   10/01/13 59 kg (130 lb)              We Performed the Following     OCT Retina Spectralis OD (right eye)     Elizabeth-Operative Worksheet (Ant Seg)     Refraction        Primary Care Provider Office Phone # Fax #    [facility] [phone]       [address] [place] [address]        Equal Access to Services     JEFFERSON DAN : Hadii riki ku hadasho Soomaali, waaxda luqadaha, qaybta kaalmada adeegyada, kathy varela. So [facility] [phone].    ATENCIÓN: Si habla español, tiene a camp disposición servicios gratuitos de asistencia lingüística. Sammi al [phone].    We comply with applicable federal civil rights laws and [place] laws. We do not discriminate on the basis of race, color, national origin, age, disability sex, sexual orientation or gender identity.            Thank you!     Thank you for choosing EYE CLINIC  for your care. Our goal is always to provide you with excellent care. Hearing back from our patients is one way we can continue to improve our services. Please take a few minutes to complete the written survey that you may receive in the mail after your visit with us. Thank  you!             Your Updated Medication List - Protect others around you: Learn how to safely use, store and throw away your medicines at www.disposemymeds.org.          This list is accurate as of: 5/2/17 11:59 PM.  Always use your most recent med list.                   Brand Name Dispense Instructions for use Diagnosis    albuterol 108 (90 BASE) MCG/ACT Inhaler    PROAIR HFA/PROVENTIL HFA/VENTOLIN HFA     Inhale 2 puffs into the lungs every 4 hours as needed        artificial tears Oint ophthalmic ointment     1 Tube    Place 1 g into both eyes At Bedtime    Tear film insufficiency, bilateral       budesonide-formoterol 80-4.5 MCG/ACT Inhaler    SYMBICORT     Inhale 2 puffs into the lungs 2 times daily        dorzolamide-timolol 2-0.5 % ophthalmic solution    COSOPT    1 Bottle    Place 1 drop into the right eye 2 times daily    Penetrating keratoplasty graft in place       flunisolide 25 MCG/ACT (0.025%) Soln spray    NASALIDE    1 Bottle    2 sprays by Both Nostrils route every 12 hours.    ETD (eustachian tube dysfunction)       FUROSEMIDE PO      Take 40 mg by mouth daily        HYDROcodone-acetaminophen 5-325 MG per tablet    NORCO    10 tablet    Take 1 tablet by mouth every 6 hours as needed for pain Maximum of 4000 mg of acetaminophen in 24 hours.    Postoperative eye state       LEVOTHYROXINE SODIUM PO      Take 50 mcg by mouth daily        LISINOPRIL PO      Take 10 mg by mouth daily        metoprolol 25 MG tablet    LOPRESSOR     Take 25 mg by mouth daily        NITROSTAT SL      Place 0.4 mg under the tongue every 5 minutes as needed        OMEPRAZOLE PO      Take 40 mg by mouth every morning        polyvinyl alcohol 1.4 % ophthalmic solution    LIQUITEARS    15 mL    Place 1 drop into both eyes 4 times daily    Tear film insufficiency, bilateral       prednisoLONE acetate 1 % ophthalmic susp    PRED FORTE    1 Bottle    Place 1 drop into the right eye 2 times daily    Penetrating keratoplasty graft  in place       tenofovir 300 MG tablet    VIREAD     Take 300 mg by mouth daily        UNKNOWN TO PATIENT      Breathing, gout, high blood pressure medications

## 2017-05-08 ENCOUNTER — COMMUNICATION - HEALTHEAST (OUTPATIENT)
Dept: NURSING | Facility: CLINIC | Age: 82
End: 2017-05-08

## 2017-05-08 ENCOUNTER — COMMUNICATION - HEALTHEAST (OUTPATIENT)
Dept: FAMILY MEDICINE | Facility: CLINIC | Age: 82
End: 2017-05-08

## 2017-05-08 DIAGNOSIS — R52 PAIN: ICD-10-CM

## 2017-05-08 DIAGNOSIS — M10.00 IDIOPATHIC GOUT, UNSPECIFIED CHRONICITY, UNSPECIFIED SITE: ICD-10-CM

## 2017-05-13 ENCOUNTER — COMMUNICATION - HEALTHEAST (OUTPATIENT)
Dept: FAMILY MEDICINE | Facility: CLINIC | Age: 82
End: 2017-05-13

## 2017-05-13 DIAGNOSIS — J44.89 CHRONIC AIRWAY OBSTRUCTION, NOT ELSEWHERE CLASSIFIED: ICD-10-CM

## 2017-05-19 ENCOUNTER — RECORDS - HEALTHEAST (OUTPATIENT)
Dept: ADMINISTRATIVE | Facility: OTHER | Age: 82
End: 2017-05-19

## 2017-05-24 ENCOUNTER — OFFICE VISIT - HEALTHEAST (OUTPATIENT)
Dept: FAMILY MEDICINE | Facility: CLINIC | Age: 82
End: 2017-05-24

## 2017-05-24 ENCOUNTER — RECORDS - HEALTHEAST (OUTPATIENT)
Dept: GENERAL RADIOLOGY | Facility: CLINIC | Age: 82
End: 2017-05-24

## 2017-05-24 DIAGNOSIS — J44.1 CHRONIC OBSTRUCTIVE PULMONARY DISEASE WITH ACUTE EXACERBATION (H): ICD-10-CM

## 2017-05-24 DIAGNOSIS — I50.22 CHRONIC SYSTOLIC HEART FAILURE (H): ICD-10-CM

## 2017-05-24 DIAGNOSIS — R06.02 SHORTNESS OF BREATH: ICD-10-CM

## 2017-05-24 DIAGNOSIS — R06.02 SOB (SHORTNESS OF BREATH): ICD-10-CM

## 2017-05-24 DIAGNOSIS — M79.671 FOOT PAIN, BILATERAL: ICD-10-CM

## 2017-05-24 DIAGNOSIS — R07.89 CHEST PRESSURE: ICD-10-CM

## 2017-05-24 DIAGNOSIS — M79.672 FOOT PAIN, BILATERAL: ICD-10-CM

## 2017-05-24 DIAGNOSIS — M10.9 GOUT: ICD-10-CM

## 2017-05-24 DIAGNOSIS — J93.83 SPONTANEOUS PNEUMOTHORAX: ICD-10-CM

## 2017-05-24 DIAGNOSIS — M54.50 LOW BACK PAIN: ICD-10-CM

## 2017-05-24 DIAGNOSIS — I25.10 ATHEROSCLEROSIS OF NATIVE CORONARY ARTERY OF NATIVE HEART WITHOUT ANGINA PECTORIS: ICD-10-CM

## 2017-05-24 DIAGNOSIS — R51.9 HEADACHE: ICD-10-CM

## 2017-05-24 DIAGNOSIS — R07.89 OTHER CHEST PAIN: ICD-10-CM

## 2017-05-24 DIAGNOSIS — J18.9 CAP (COMMUNITY ACQUIRED PNEUMONIA): ICD-10-CM

## 2017-05-24 ASSESSMENT — MIFFLIN-ST. JEOR: SCORE: 994.24

## 2017-05-25 ENCOUNTER — RECORDS - HEALTHEAST (OUTPATIENT)
Dept: ADMINISTRATIVE | Facility: OTHER | Age: 82
End: 2017-05-25

## 2017-05-25 LAB
ATRIAL RATE - MUSE: 68 BPM
DIASTOLIC BLOOD PRESSURE - MUSE: NORMAL MMHG
INTERPRETATION ECG - MUSE: NORMAL
P AXIS - MUSE: 73 DEGREES
PR INTERVAL - MUSE: 150 MS
QRS DURATION - MUSE: 98 MS
QT - MUSE: 416 MS
QTC - MUSE: 442 MS
R AXIS - MUSE: 88 DEGREES
SYSTOLIC BLOOD PRESSURE - MUSE: NORMAL MMHG
T AXIS - MUSE: 83 DEGREES
VENTRICULAR RATE- MUSE: 68 BPM

## 2017-05-26 ENCOUNTER — HOME CARE/HOSPICE - HEALTHEAST (OUTPATIENT)
Dept: HOME HEALTH SERVICES | Facility: HOME HEALTH | Age: 82
End: 2017-05-26

## 2017-06-01 ENCOUNTER — COMMUNICATION - HEALTHEAST (OUTPATIENT)
Dept: FAMILY MEDICINE | Facility: CLINIC | Age: 82
End: 2017-06-01

## 2017-06-02 ENCOUNTER — COMMUNICATION - HEALTHEAST (OUTPATIENT)
Dept: NURSING | Facility: CLINIC | Age: 82
End: 2017-06-02

## 2017-06-06 ENCOUNTER — COMMUNICATION - HEALTHEAST (OUTPATIENT)
Dept: FAMILY MEDICINE | Facility: CLINIC | Age: 82
End: 2017-06-06

## 2017-06-06 DIAGNOSIS — E11.9 TYPE 2 DIABETES MELLITUS (H): ICD-10-CM

## 2017-06-09 ENCOUNTER — RECORDS - HEALTHEAST (OUTPATIENT)
Dept: ADMINISTRATIVE | Facility: OTHER | Age: 82
End: 2017-06-09

## 2017-06-13 ENCOUNTER — RECORDS - HEALTHEAST (OUTPATIENT)
Dept: ADMINISTRATIVE | Facility: OTHER | Age: 82
End: 2017-06-13

## 2017-06-15 ENCOUNTER — RECORDS - HEALTHEAST (OUTPATIENT)
Dept: ADMINISTRATIVE | Facility: OTHER | Age: 82
End: 2017-06-15

## 2017-06-19 ENCOUNTER — RECORDS - HEALTHEAST (OUTPATIENT)
Dept: ADMINISTRATIVE | Facility: OTHER | Age: 82
End: 2017-06-19

## 2017-06-23 ENCOUNTER — RECORDS - HEALTHEAST (OUTPATIENT)
Dept: GENERAL RADIOLOGY | Facility: CLINIC | Age: 82
End: 2017-06-23

## 2017-06-23 ENCOUNTER — OFFICE VISIT - HEALTHEAST (OUTPATIENT)
Dept: FAMILY MEDICINE | Facility: CLINIC | Age: 82
End: 2017-06-23

## 2017-06-23 DIAGNOSIS — J44.9 COPD (CHRONIC OBSTRUCTIVE PULMONARY DISEASE) (H): ICD-10-CM

## 2017-06-23 DIAGNOSIS — Z01.818 ENCOUNTER FOR OTHER PREPROCEDURAL EXAMINATION: ICD-10-CM

## 2017-06-23 DIAGNOSIS — C22.0 HEPATOCELLULAR CARCINOMA (H): ICD-10-CM

## 2017-06-23 DIAGNOSIS — I25.10 ATHEROSCLEROSIS OF NATIVE CORONARY ARTERY OF NATIVE HEART WITHOUT ANGINA PECTORIS: ICD-10-CM

## 2017-06-23 DIAGNOSIS — E11.22 CKD STAGE 3 SECONDARY TO DIABETES (H): ICD-10-CM

## 2017-06-23 DIAGNOSIS — I10 ESSENTIAL HYPERTENSION: ICD-10-CM

## 2017-06-23 DIAGNOSIS — Z01.818 PREOP EXAMINATION: ICD-10-CM

## 2017-06-23 DIAGNOSIS — N18.30 CKD STAGE 3 SECONDARY TO DIABETES (H): ICD-10-CM

## 2017-06-23 ASSESSMENT — MIFFLIN-ST. JEOR: SCORE: 998.78

## 2017-06-27 ENCOUNTER — OFFICE VISIT - HEALTHEAST (OUTPATIENT)
Dept: NURSING | Facility: CLINIC | Age: 82
End: 2017-06-27

## 2017-06-27 DIAGNOSIS — Z87.09 HISTORY OF COPD: ICD-10-CM

## 2017-06-27 DIAGNOSIS — I50.22 CHRONIC SYSTOLIC HEART FAILURE (H): ICD-10-CM

## 2017-06-27 DIAGNOSIS — M1A.0290 CHRONIC GOUT OF ELBOW, UNSPECIFIED CAUSE, UNSPECIFIED LATERALITY: ICD-10-CM

## 2017-06-27 DIAGNOSIS — E78.5 HYPERLIPIDEMIA: ICD-10-CM

## 2017-07-03 ENCOUNTER — RECORDS - HEALTHEAST (OUTPATIENT)
Dept: ADMINISTRATIVE | Facility: OTHER | Age: 82
End: 2017-07-03

## 2017-07-03 ENCOUNTER — COMMUNICATION - HEALTHEAST (OUTPATIENT)
Dept: FAMILY MEDICINE | Facility: CLINIC | Age: 82
End: 2017-07-03

## 2017-07-03 DIAGNOSIS — J44.9 COPD (CHRONIC OBSTRUCTIVE PULMONARY DISEASE) (H): ICD-10-CM

## 2017-07-05 ENCOUNTER — COMMUNICATION - HEALTHEAST (OUTPATIENT)
Dept: FAMILY MEDICINE | Facility: CLINIC | Age: 82
End: 2017-07-05

## 2017-07-05 ENCOUNTER — HOSPITAL ENCOUNTER (OUTPATIENT)
Dept: RESPIRATORY THERAPY | Facility: HOSPITAL | Age: 82
Discharge: HOME | End: 2017-07-05
Attending: FAMILY MEDICINE

## 2017-07-05 DIAGNOSIS — J44.9 COPD (CHRONIC OBSTRUCTIVE PULMONARY DISEASE) (H): ICD-10-CM

## 2017-07-07 ENCOUNTER — OFFICE VISIT - HEALTHEAST (OUTPATIENT)
Dept: FAMILY MEDICINE | Facility: CLINIC | Age: 82
End: 2017-07-07

## 2017-07-07 DIAGNOSIS — I25.10 ATHEROSCLEROSIS OF NATIVE CORONARY ARTERY OF NATIVE HEART WITHOUT ANGINA PECTORIS: ICD-10-CM

## 2017-07-07 DIAGNOSIS — K76.89 NODULE ON LIVER: ICD-10-CM

## 2017-07-07 DIAGNOSIS — J44.9 COPD (CHRONIC OBSTRUCTIVE PULMONARY DISEASE) (H): ICD-10-CM

## 2017-07-07 DIAGNOSIS — Z00.00 HEALTHCARE MAINTENANCE: ICD-10-CM

## 2017-07-07 DIAGNOSIS — I10 ESSENTIAL HYPERTENSION: ICD-10-CM

## 2017-07-07 ASSESSMENT — MIFFLIN-ST. JEOR: SCORE: 989.71

## 2017-07-10 ENCOUNTER — RECORDS - HEALTHEAST (OUTPATIENT)
Dept: ADMINISTRATIVE | Facility: OTHER | Age: 82
End: 2017-07-10

## 2017-07-18 ENCOUNTER — OFFICE VISIT - HEALTHEAST (OUTPATIENT)
Dept: NURSING | Facility: CLINIC | Age: 82
End: 2017-07-18

## 2017-07-18 DIAGNOSIS — M1A.0290 CHRONIC GOUT OF ELBOW, UNSPECIFIED CAUSE, UNSPECIFIED LATERALITY: ICD-10-CM

## 2017-07-18 DIAGNOSIS — M47.812 FACET ARTHROPATHY, CERVICAL: ICD-10-CM

## 2017-07-18 DIAGNOSIS — Z79.899 MEDICATION MANAGEMENT: ICD-10-CM

## 2017-07-18 DIAGNOSIS — I50.22 CHRONIC SYSTOLIC HEART FAILURE (H): ICD-10-CM

## 2017-07-28 ENCOUNTER — RECORDS - HEALTHEAST (OUTPATIENT)
Dept: ADMINISTRATIVE | Facility: OTHER | Age: 82
End: 2017-07-28

## 2017-07-31 ENCOUNTER — COMMUNICATION - HEALTHEAST (OUTPATIENT)
Dept: FAMILY MEDICINE | Facility: CLINIC | Age: 82
End: 2017-07-31

## 2017-08-01 ENCOUNTER — OFFICE VISIT (OUTPATIENT)
Dept: OPHTHALMOLOGY | Facility: CLINIC | Age: 82
End: 2017-08-01
Attending: OPHTHALMOLOGY
Payer: COMMERCIAL

## 2017-08-01 DIAGNOSIS — H54.7 DECREASED VISION: ICD-10-CM

## 2017-08-01 DIAGNOSIS — Z96.1 IOL PRESENT IN ANTERIOR CHAMBER: Primary | ICD-10-CM

## 2017-08-01 DIAGNOSIS — Z94.7 H/O CORNEA TRANSPLANT: ICD-10-CM

## 2017-08-01 DIAGNOSIS — H30.91 POSTERIOR UVEITIS, RIGHT EYE: ICD-10-CM

## 2017-08-01 PROCEDURE — 92134 CPTRZ OPH DX IMG PST SGM RTA: CPT | Mod: ZF | Performed by: OPHTHALMOLOGY

## 2017-08-01 PROCEDURE — 99212 OFFICE O/P EST SF 10 MIN: CPT | Mod: 25

## 2017-08-01 PROCEDURE — 92235 FLUORESCEIN ANGRPH MLTIFRAME: CPT | Mod: ZF | Performed by: OPHTHALMOLOGY

## 2017-08-01 RX ORDER — DIFLUPREDNATE OPHTHALMIC 0.5 MG/ML
1 EMULSION OPHTHALMIC 4 TIMES DAILY
Qty: 1 BOTTLE | Refills: 0 | Status: SHIPPED | OUTPATIENT
Start: 2017-08-01

## 2017-08-01 ASSESSMENT — CONF VISUAL FIELD
OS_INFERIOR_TEMPORAL_RESTRICTION: 3
OD_SUPERIOR_NASAL_RESTRICTION: 1
OS_SUPERIOR_NASAL_RESTRICTION: 3
OD_INFERIOR_NASAL_RESTRICTION: 1
OD_INFERIOR_TEMPORAL_RESTRICTION: 1
OS_SUPERIOR_TEMPORAL_RESTRICTION: 3
OS_INFERIOR_NASAL_RESTRICTION: 3
OD_SUPERIOR_TEMPORAL_RESTRICTION: 1

## 2017-08-01 ASSESSMENT — EXTERNAL EXAM - LEFT EYE: OS_EXAM: MILD BROW PTOSIS

## 2017-08-01 ASSESSMENT — TONOMETRY
IOP_METHOD: ICARE
OD_IOP_MMHG: 05
OS_IOP_MMHG: 09

## 2017-08-01 ASSESSMENT — VISUAL ACUITY
OD_SC: HM
METHOD: SNELLEN - LINEAR
OS_SC: 20/100

## 2017-08-01 ASSESSMENT — EXTERNAL EXAM - RIGHT EYE: OD_EXAM: NORMAL

## 2017-08-01 ASSESSMENT — CUP TO DISC RATIO
OD_RATIO: 0.4
OS_RATIO: 0.4

## 2017-08-01 NOTE — Clinical Note
No likely retinal pathology to explain vision.  ?media, could do neuro-ophth referral but low suspicion

## 2017-08-01 NOTE — PROGRESS NOTES
CC: Subnormal vision OD     INTERVAL HISTORY -   First visit with me    HPI: Patient with hx of previous PKP OD and ACIOL. Had repeat episode graft rejection last summer, resolved as of 9/16, seen by Page.    VA OD has been worsening, per Page decreased peripheral vision, no clear explanation on exam, VA worse than expected from corneal exam     Feels occasional itchiness and FBS OD, VA same.     PAST OCULAR SURGERY  PKP OD 2013  CE/ACIOL OD 2014       GTTS  Prednisolone four a day OD   Cosopt BID OD   AT gtts BID and PRN       RETINAL IMAGING     OCT 8-1-17  OD -  Poor image quality, no likely fluid, retina appears likely normal thickness  OS - mild atrophy, otherwise normal    FA 8/1/17  OD - (transit) normal filing, 1+ ONH leakage, 1+ patchy diffuse/petalloid leakage, no clear vasculitis  OS - normal filling, normal       ASSESSMENT & PLAN    1. Subnormal VA OD   - ?etiology, progressive worsening over past year   - ?media opacity s/t corneal pathology, media doesn't appear sufficient     - no clear e/o vessel occlusion or other retinal pathology   - mild ocular inflammation on FA but no CME   - no retinal pathology evident  to explain VA     - could consider neuro-ophth referral but low suspicion of pathology to explain vision    2.  Mild inflammation OD   - seen on FA, no CME   - on PF 4/day    - try Durezol 4/day instead   - recheck 4 weeks      3.  H/o HSV keratitis   - no active HSV today                                Juan Carlos Yung MD, PhD  Vitreoretinal Surgery Fellow    ATTESTATION     Attending Physician Attestation:      Complete documentation of historical and exam elements from today's encounter can be found in the full encounter summary report (not reduplicated in this progress note).  I personally obtained the chief complaint(s) and history of present illness.  I confirmed and edited as necessary the review of systems, past medical/surgical history, family history, social history, and examination  findings as documented by others; and I examined the patient myself.  I personally reviewed the relevant tests, images, and reports as documented above.  I formulated and edited as necessary the assessment and plan and discussed the findings and management plan with the patient and family        Syl Richards MD, PhD  , Vitreoretinal Surgery  Department of Ophthalmology  [facility]

## 2017-08-01 NOTE — NURSING NOTE
Chief Complaints and History of Present Illnesses   Patient presents with     Follow Up For     Retina evaluation      HPI    Affected eye(s):  Both   Symptoms:        Duration:  2 months   Frequency:  Constant       Do you have eye pain now?:  Yes   Location:  OD   Pain Level:  Moderate Pain (4)   Pain Duration:  2 weeks   Pain Frequency:  Constant   Pain Characteristics:  Stabbing, Aching, Burning      Comments:  Pt. States that he has been having pain RE for the last 2 weeks.  No change in VA BE.  VA RE is extremely poor.  Kassandra Godfrey COT 9:00 AM August 1, 2017

## 2017-08-01 NOTE — MR AVS SNAPSHOT
After Visit Summary   8/1/2017    Evelio Odell    MRN: [id number]           Patient Information     Date Of Birth          2/15/1934        Visit Information        Provider Department      8/1/2017 8:45 AM Syl Richards MD; LANGUAGE [facility]        Today's Diagnoses     IOL present in anterior chamber - Right Eye    -  1    H/O cornea transplant - Right Eye        Decreased vision        Posterior uveitis, right eye           Follow-ups after your visit        Follow-up notes from your care team     Return in about 1 month (around 9/1/2017) for OCT OU.      Your next 10 appointments already scheduled     Sep 05, 2017  2:15 PM CDT   RETURN RETINA with Syl Richards MD   Eye Clinic ([facility])    [address]  [address] [facility]  9th Fl Clin 9a  [facility] [address] [phone]              Future tests that were ordered for you today     Open Future Orders        Priority Expected Expires Ordered    Fluorescein Angiography OU (both eyes) Routine  2/2/2019 8/1/2017            Who to contact     Please call your clinic at [phone] to:    Ask questions about your health    Make or cancel appointments    Discuss your medicines    Learn about your test results    Speak to your doctor   If you have compliments or concerns about an experience at your clinic, or if you wish to file a complaint, please contact [facility] Patient Relations at [phone] or email us at [email or web address]         Additional Information About Your Visit        MyChart Information     SnapMDt is an electronic gateway that provides easy, online access to your medical records. With Negevtech, you can request a clinic appointment, read your test results, renew a prescription or communicate with your care team.     To sign up for SnapMDt visit the website at [email or web address]   You will be asked to enter the access code listed  below, as well as some personal information. Please follow the directions to create your username and password.     Your access code is: [email or web address]  Expires: 2017  6:31 AM     Your access code will  in 90 days. If you need help or a new code, please contact your [facility] or call [phone] for assistance.        Care EveryWhere ID     This is your Care EveryWhere ID. This could be used by other organizations to access your [place] medical records  [id number]         Blood Pressure from Last 3 Encounters:   14 154/75   10/01/13 (!) 151/92    Weight from Last 3 Encounters:   14 56.7 kg (125 lb)   10/01/13 59 kg (130 lb)              We Performed the Following     OCT Retina Spectralis OU (both eyes)          Today's Medication Changes          These changes are accurate as of: 17 11:26 AM.  If you have any questions, ask your nurse or doctor.               Start taking these medicines.        Dose/Directions    difluprednate 0.05 % ophthalmic emulsion   Commonly known as:  DUREZOL   Used for:  H/O cornea transplant, Posterior uveitis, right eye   Started by:  Syl Richards MD        Dose:  1 drop   Place 1 drop into the right eye 4 times daily   Quantity:  1 Bottle   Refills:  0            Where to get your medicines      These medications were sent to [facility] - [place], [place] - [address]  [address], [place] [address]     Phone:  [phone]     difluprednate 0.05 % ophthalmic emulsion                Primary Care Provider Office Phone # Fax #    [facility] [phone]       [address] [place] [address]        Equal Access to Services     JEFFERSON DAN AH: Melly Caldwell, nadia dueñas, qaybshabbir maealmakathy abraham. So [facility] [phone].    ATENCIÓN: Si habla español, tiene a camp disposición servicios  max de asistencia lingüística. Sammi benavides [phone].    We comply with applicable federal civil rights laws and [place] laws. We do not discriminate on the basis of race, color, national origin, age, disability sex, sexual orientation or gender identity.            Thank you!     Thank you for choosing EYE CLINIC  for your care. Our goal is always to provide you with excellent care. Hearing back from our patients is one way we can continue to improve our services. Please take a few minutes to complete the written survey that you may receive in the mail after your visit with us. Thank you!             Your Updated Medication List - Protect others around you: Learn how to safely use, store and throw away your medicines at www.disposemymeds.org.          This list is accurate as of: 8/1/17 11:26 AM.  Always use your most recent med list.                   Brand Name Dispense Instructions for use Diagnosis    albuterol 108 (90 BASE) MCG/ACT Inhaler    PROAIR HFA/PROVENTIL HFA/VENTOLIN HFA     Inhale 2 puffs into the lungs every 4 hours as needed        artificial tears Oint ophthalmic ointment     1 Tube    Place 1 g into both eyes At Bedtime    Tear film insufficiency, bilateral       budesonide-formoterol 80-4.5 MCG/ACT Inhaler    SYMBICORT     Inhale 2 puffs into the lungs 2 times daily        difluprednate 0.05 % ophthalmic emulsion    DUREZOL    1 Bottle    Place 1 drop into the right eye 4 times daily    H/O cornea transplant, Posterior uveitis, right eye       dorzolamide-timolol 2-0.5 % ophthalmic solution    COSOPT    1 Bottle    Place 1 drop into the right eye 2 times daily    Penetrating keratoplasty graft in place       flunisolide 25 MCG/ACT (0.025%) Soln spray    NASALIDE    1 Bottle    2 sprays by Both Nostrils route every 12 hours.    ETD (eustachian tube dysfunction)       FUROSEMIDE PO      Take 40 mg by mouth daily        HYDROcodone-acetaminophen 5-325 MG per tablet    NORCO    10 tablet     Take 1 tablet by mouth every 6 hours as needed for pain Maximum of 4000 mg of acetaminophen in 24 hours.    Postoperative eye state       LEVOTHYROXINE SODIUM PO      Take 50 mcg by mouth daily        LISINOPRIL PO      Take 10 mg by mouth daily        metoprolol 25 MG tablet    LOPRESSOR     Take 25 mg by mouth daily        NITROSTAT SL      Place 0.4 mg under the tongue every 5 minutes as needed        OMEPRAZOLE PO      Take 40 mg by mouth every morning        polyvinyl alcohol 1.4 % ophthalmic solution    LIQUITEARS    15 mL    Place 1 drop into both eyes 4 times daily    Tear film insufficiency, bilateral       prednisoLONE acetate 1 % ophthalmic susp    PRED FORTE    1 Bottle    Place 1 drop into the right eye 2 times daily    Penetrating keratoplasty graft in place       tenofovir 300 MG tablet    VIREAD     Take 300 mg by mouth daily        UNKNOWN TO PATIENT      Breathing, gout, high blood pressure medications

## 2017-08-02 ENCOUNTER — COMMUNICATION - HEALTHEAST (OUTPATIENT)
Dept: PULMONOLOGY | Facility: OTHER | Age: 82
End: 2017-08-02

## 2017-08-02 ENCOUNTER — AMBULATORY - HEALTHEAST (OUTPATIENT)
Dept: FAMILY MEDICINE | Facility: CLINIC | Age: 82
End: 2017-08-02

## 2017-08-02 DIAGNOSIS — J44.9 COPD (CHRONIC OBSTRUCTIVE PULMONARY DISEASE) (H): ICD-10-CM

## 2017-08-02 DIAGNOSIS — Z01.818 PREOP EXAMINATION: ICD-10-CM

## 2017-08-02 DIAGNOSIS — I25.10 ATHEROSCLEROSIS OF NATIVE CORONARY ARTERY OF NATIVE HEART WITHOUT ANGINA PECTORIS: ICD-10-CM

## 2017-08-06 ENCOUNTER — COMMUNICATION - HEALTHEAST (OUTPATIENT)
Dept: PHYSICAL MEDICINE AND REHAB | Facility: CLINIC | Age: 82
End: 2017-08-06

## 2017-08-06 DIAGNOSIS — M54.2 NECK PAIN ON RIGHT SIDE: ICD-10-CM

## 2017-08-06 DIAGNOSIS — M48.02 CERVICAL STENOSIS OF SPINE: ICD-10-CM

## 2017-09-05 NOTE — NURSING NOTE
Chief Complaints and History of Present Illnesses   Patient presents with     Follow Up For     1 month follow up Mild inflammation OD     HPI    Affected eye(s):  Both   Symptoms:     No floaters   No flashes   No redness   No Dryness         Do you have eye pain now?:  No      Comments:  Pt states vision in both eyes appears more blurry today. Pt having zainab and itchy feeling in RE today, but has had it for many years. Limited relief with eye drops.  DM2 BS: Pt doesn't check sugars daily.  A1C: unknown to pt.  No results found for: A1C:    Sienna CAMARGO September 5, 2017 2:52 PM

## 2017-09-05 NOTE — MR AVS SNAPSHOT
After Visit Summary   2017    Evelio Odell    MRN: [id number]           Patient Information     Date Of Birth          2/15/1934        Visit Information        Provider Department      2017 2:15 PM Sybil Perez; Syl Richards MD Eye Clinic        Today's Diagnoses     IOL present in anterior chamber - Right Eye        H/O cornea transplant - Right Eye        Decreased vision        Posterior uveitis, right eye           Follow-ups after your visit        Who to contact     Please call your clinic at [phone] to:    Ask questions about your health    Make or cancel appointments    Discuss your medicines    Learn about your test results    Speak to your doctor   If you have compliments or concerns about an experience at your clinic, or if you wish to file a complaint, please contact [facility] Patient Relations at [phone] or email us at [email or web address]         Additional Information About Your Visit        MyChart Information     Our Nurses Networkt is an electronic gateway that provides easy, online access to your medical records. With 3TIER, you can request a clinic appointment, read your test results, renew a prescription or communicate with your care team.     To sign up for 3TIER visit the website at [email or web address]   You will be asked to enter the access code listed below, as well as some personal information. Please follow the directions to create your username and password.     Your access code is: [email or web address]  Expires: 2017  6:31 AM     Your access code will  in 90 days. If you need help or a new code, please contact your [facility] or call [phone] for assistance.        Care EveryWhere ID     This is your Care EveryWhere ID. This could be used by other organizations to access your [place] medical records  [id number]         Blood Pressure from Last 3 Encounters:   14  154/75   10/01/13 (!) 151/92    Weight from Last 3 Encounters:   02/19/14 56.7 kg (125 lb)   10/01/13 59 kg (130 lb)              We Performed the Following     OCT Optic Nerve RNFL Spectralis OU (both eyes)     OCT Retina Spectralis OU (both eyes)        Primary Care Provider Office Phone # Fax #    [facility] [phone]       [address] [place] [address]        Equal Access to Services     JEFFERSON DAN : Hadii aad ku hadasho Soomaali, waaxda luqadaha, qaybta kaalmada adeegyada, waxay idiin hayaan adeeg kharash la'rajesh . So [facility] [phone].    ATENCIÓN: Si diannala español, tiene a camp disposición servicios gratuitos de asistencia lingüística. [facility] [place] [phone].    We comply with applicable federal civil rights laws and [place] laws. We do not discriminate on the basis of race, color, national origin, age, disability sex, sexual orientation or gender identity.            Thank you!     Thank you for choosing EYE CLINIC  for your care. Our goal is always to provide you with excellent care. Hearing back from our patients is one way we can continue to improve our services. Please take a few minutes to complete the written survey that you may receive in the mail after your visit with us. Thank you!             Your Updated Medication List - Protect others around you: Learn how to safely use, store and throw away your medicines at www.disposemymeds.org.          This list is accurate as of: 9/5/17 10:18 PM.  Always use your most recent med list.                   Brand Name Dispense Instructions for use Diagnosis    albuterol 108 (90 BASE) MCG/ACT Inhaler    PROAIR HFA/PROVENTIL HFA/VENTOLIN HFA     Inhale 2 puffs into the lungs every 4 hours as needed        artificial tears Oint ophthalmic ointment     1 Tube    Place 1 g into both eyes At Bedtime    Tear film insufficiency, bilateral       budesonide-formoterol 80-4.5 MCG/ACT Inhaler    SYMBICORT     Inhale 2 puffs into the  lungs 2 times daily        difluprednate 0.05 % ophthalmic emulsion    DUREZOL    1 Bottle    Place 1 drop into the right eye 4 times daily    H/O cornea transplant, Posterior uveitis, right eye       dorzolamide-timolol 2-0.5 % ophthalmic solution    COSOPT    1 Bottle    Place 1 drop into the right eye 2 times daily    Penetrating keratoplasty graft in place       flunisolide 25 MCG/ACT (0.025%) Soln spray    NASALIDE    1 Bottle    2 sprays by Both Nostrils route every 12 hours.    ETD (eustachian tube dysfunction)       FUROSEMIDE PO      Take 40 mg by mouth daily        HYDROcodone-acetaminophen 5-325 MG per tablet    NORCO    10 tablet    Take 1 tablet by mouth every 6 hours as needed for pain Maximum of 4000 mg of acetaminophen in 24 hours.    Postoperative eye state       LEVOTHYROXINE SODIUM PO      Take 50 mcg by mouth daily        LISINOPRIL PO      Take 10 mg by mouth daily        metoprolol 25 MG tablet    LOPRESSOR     Take 25 mg by mouth daily        NITROSTAT SL      Place 0.4 mg under the tongue every 5 minutes as needed        OMEPRAZOLE PO      Take 40 mg by mouth every morning        polyvinyl alcohol 1.4 % ophthalmic solution    LIQUITEARS    15 mL    Place 1 drop into both eyes 4 times daily    Tear film insufficiency, bilateral       prednisoLONE acetate 1 % ophthalmic susp    PRED FORTE    1 Bottle    Place 1 drop into the right eye 2 times daily    Penetrating keratoplasty graft in place       tenofovir 300 MG tablet    VIREAD     Take 300 mg by mouth daily        UNKNOWN TO PATIENT      Breathing, gout, high blood pressure medications

## 2017-09-05 NOTE — PROGRESS NOTES
CC: Subnormal vision OD     INTERVAL HISTORY -   No changes since BETZY.  Was able to change PF to Durezol, using 4/day OD, but was very expensive for patient.      HPI: Patient with hx of previous PKP OD and ACIOL. Had repeat episode graft rejection summer 2016, resolved as of 9/16, seen by Page.  VA OD has been worsening, per Page decreased peripheral vision, no clear explanation on exam, VA worse than expected from corneal exam    patient son: Koffi Odell [phone]      PAST OCULAR SURGERY  PKP OD 2013  CE/ACIOL OD 2014       GTTS  Durezol 4/day  OD   Cosopt BID OD   AT gtts BID and PRN       RETINAL IMAGING     OCT 9-5-17  OD -  Poor image quality, no likely fluid, retina appears likely normal thickness  OS - mild atrophy, otherwise normal    OCT RNFL 9-5-17  OD - normal  OS -essentially normal      FA 8/1/17  OD - (transit) normal filing, 1+ ONH leakage, 1+ patchy diffuse/petalloid leakage, no clear vasculitis  OS - normal filling, normal    DALTON 9--5-17  Right eye 20/40       ASSESSMENT & PLAN    1. Subnormal VA OD   - ?etiology, progressive worsening over past year   - retina eval normal, no likely ON pathology based on exam and OCT RNFL      - suspect media opacity/cornea despite relatively benign exam   - DALTON 9/2017 to 20/40 OD very encouraging   - could consider repeat PKP   - will d/w Page     - could consider neuro-ophth referral but low suspicion of pathology to explain vision    2.  Mild inflammation OD   - seen on FA, no CME   - No subjective difference on Durezol 4/day or PF 4/day   - dificult for patient to afford Durezol   - change back to PF 4/day   -f/u with Page      3.  H/o HSV keratitis   - no active HSV today         f/u with Page next few months to discuss options  F/u with Retina PRN      ATTESTATION     Attending Physician Attestation:      Complete documentation of historical and exam elements from today's encounter can be found in the full encounter summary report (not reduplicated in  this progress note).  I personally obtained the chief complaint(s) and history of present illness.  I confirmed and edited as necessary the review of systems, past medical/surgical history, family history, social history, and examination findings as documented by others; and I examined the patient myself.  I personally reviewed the relevant tests, images, and reports as documented above.  I formulated and edited as necessary the assessment and plan and discussed the findings and management plan with the patient and family        Syl Richards MD, PhD  , Vitreoretinal Surgery  Department of Ophthalmology  [facility]

## 2017-10-12 NOTE — MR AVS SNAPSHOT
After Visit Summary   10/12/2017    Evelio Odell    MRN: [id number]           Patient Information     Date Of Birth          2/15/1934        Visit Information        Provider Department      10/12/2017 3:30 PM Endy Harkins MD; [facility]        Care Instructions    Start Ofloxacin (tan top) 4 times per day  Start Prednisolone (pink top) 4 times per day  Start artificial tears every 2 hours  Start refresh PM 3 times per day  No heavy lifting  Wear the eye shield at night          Follow-ups after your visit        Follow-up notes from your care team     Return in about 1 week (around 10/19/2017).      Your next 10 appointments already scheduled     Oct 19, 2017  3:30 PM CDT   Post-Op with Endy Harkins MD   Eye Clinic ([facility])    [address]  [address] [place] [address] Clin [address] [place] [address]   [phone]            Nov 14, 2017  2:30 PM CST   Post-Op with Endy Harkins MD   Eye Clinic ([facility])    [address]  [address] [place] [id number] [phone]              Who to contact     Please call your clinic at [phone] to:    Ask questions about your health    Make or cancel appointments    Discuss your medicines    Learn about your test results    Speak to your doctor   If you have compliments or concerns about an experience at your clinic, or if you wish to file a complaint, please contact [facility] Patient Relations at [phone] or email us at [email or web address]         Additional Information About Your Visit        Siteminishart Information     Xoft is an electronic gateway that provides easy, online access to your medical records. With Xoft, you can request a clinic appointment, read your test results, renew a prescription or communicate with your care team.     To sign up for Xoft visit the website at  [email or web address]   You will be asked to enter the access code listed below, as well as some personal information. Please follow the directions to create your username and password.     Your access code is: [email or web address]  Expires: 2017  6:30 AM     Your access code will  in 90 days. If you need help or a new code, please contact your [facility] or call [phone] for assistance.        Care EveryWhere ID     This is your Care EveryWhere ID. This could be used by other organizations to access your [place] medical records  [id number]         Blood Pressure from Last 3 Encounters:   14 154/75   10/01/13 (!) 151/92    Weight from Last 3 Encounters:   14 56.7 kg (125 lb)   10/01/13 59 kg (130 lb)              Today, you had the following     No orders found for display       Primary Care Provider Office Phone # Fax #    [facility] [phone]       [address] [place] [address]        Equal Access to Services     JEFFERSON ADN : Hadii aad ku hadasho Soomaali, waaxda luqadaha, qaybta kaalmada adeegyapalak, kathy varela. So [facility] [phone].    ATENCIÓN: Si habla español, tiene a camp disposición servicios gratuitos de asistencia lingüística. Sammi al [phone].    We comply with applicable federal civil rights laws and [place] laws. We do not discriminate on the basis of race, color, national origin, age, disability, sex, sexual orientation, or gender identity.            Thank you!     Thank you for choosing EYE CLINIC  for your care. Our goal is always to provide you with excellent care. Hearing back from our patients is one way we can continue to improve our services. Please take a few minutes to complete the written survey that you may receive in the mail after your visit with us. Thank you!             Your Updated Medication List - Protect others around you: Learn how to safely use,  store and throw away your medicines at www.disposemymeds.org.          This list is accurate as of: 10/12/17  4:50 PM.  Always use your most recent med list.                   Brand Name Dispense Instructions for use Diagnosis    albuterol 108 (90 BASE) MCG/ACT Inhaler    PROAIR HFA/PROVENTIL HFA/VENTOLIN HFA     Inhale 2 puffs into the lungs every 4 hours as needed        artificial tears Oint ophthalmic ointment     1 Tube    Place 1 g into both eyes At Bedtime    Tear film insufficiency, bilateral       budesonide-formoterol 80-4.5 MCG/ACT Inhaler    SYMBICORT     Inhale 2 puffs into the lungs 2 times daily        difluprednate 0.05 % ophthalmic emulsion    DUREZOL    1 Bottle    Place 1 drop into the right eye 4 times daily    H/O cornea transplant, Posterior uveitis, right eye       dorzolamide-timolol 2-0.5 % ophthalmic solution    COSOPT    1 Bottle    Place 1 drop into the right eye 2 times daily    Penetrating keratoplasty graft in place       flunisolide 25 MCG/ACT (0.025%) Soln spray    NASALIDE    1 Bottle    2 sprays by Both Nostrils route every 12 hours.    ETD (eustachian tube dysfunction)       FUROSEMIDE PO      Take 40 mg by mouth daily        HYDROcodone-acetaminophen 5-325 MG per tablet    NORCO    10 tablet    Take 1 tablet by mouth every 6 hours as needed for pain Maximum of 4000 mg of acetaminophen in 24 hours.    Postoperative eye state       LEVOTHYROXINE SODIUM PO      Take 50 mcg by mouth daily        LISINOPRIL PO      Take 10 mg by mouth daily        metoprolol 25 MG tablet    LOPRESSOR     Take 25 mg by mouth daily        NITROSTAT SL      Place 0.4 mg under the tongue every 5 minutes as needed        OMEPRAZOLE PO      Take 40 mg by mouth every morning        polyvinyl alcohol 1.4 % ophthalmic solution    LIQUITEARS    15 mL    Place 1 drop into both eyes 4 times daily    Tear film insufficiency, bilateral       prednisoLONE acetate 1 % ophthalmic susp    PRED FORTE    1 Bottle     Place 1 drop into the right eye 2 times daily    Penetrating keratoplasty graft in place       tenofovir 300 MG tablet    VIREAD     Take 300 mg by mouth daily        UNKNOWN TO PATIENT      Breathing, gout, high blood pressure medications

## 2017-10-12 NOTE — NURSING NOTE
Chief Complaints and History of Present Illnesses   Patient presents with     Post Op (Ophthalmology) Right Eye     s/p 1 day P/O RE PK     HPI    Affected eye(s):  Right   Symptoms:     Foreign body sensation      Duration:  1 day      Do you have eye pain now?:  Yes   Location:  OD   Pain Level:  Moderate Pain (4), Moderate Pain (5)   Pain Duration:  1 day   Pain Characteristics:  Sharp/Quick      Comments:  Pt. stated RE eye pain along with foreign body sensation.  Heron Frausto  3:52 PM October 12, 2017

## 2017-10-12 NOTE — PATIENT INSTRUCTIONS
Start Ofloxacin (tan top) 4 times per day  Start Prednisolone (pink top) 4 times per day  Start artificial tears every 2 hours  Start refresh PM 3 times per day  No heavy lifting  Wear the eye shield at night

## 2017-10-12 NOTE — PROGRESS NOTES
CC: Postoperative day#1 PKP, Right Eye     HPI: Patient with hx of previous PKP OD and ACIOL. Had repeat episode graft rejection last summer, resolved as of 9/16    Feels occasional itchiness and FBS OD, VA same.     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 S/P PKP, Right Eye 10/11/17  Doing well  Graft attached and sutures intact  Start Oflox FOUR TIMES A DAY  Start Pred FOUR TIMES A DAY  Aggressive lubrication        ~~~~~~~~~~~~~~~~~~~~~~~~~~~~~~~~~~~~~~~~~~~~~~~~~~~~~~~~~~~~~~~~    Complete documentation of historical and exam elements from today's encounter can be found in the full encounter summary report (not reduplicated in this progress note). I personally obtained the chief complaint(s) and history of present illness.  I confirmed and edited as necessary the review of systems, past medical/surgical history, family history, social history, and examination findings as documented by others.  I examined the patient myself, and I personally reviewed the relevant tests, images, and reports as documented above. I formulated and edited as necessary the assessment and plan and discussed the findings and management plan with the patient and family.     Endy Harkins MD, MA  Director, Cornea & Anterior Segment  [facility] Department of Ophthalmology & Visual Neuroscience

## 2017-10-19 NOTE — NURSING NOTE
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 States that VA is still blurry RE,  Some itching RE.,  Kassandra Godfrey COT 3:23 PM October 19, 2017

## 2017-10-19 NOTE — PROGRESS NOTES
CC: Postoperative week#1 PKP, Right Eye     HPI: Patient with hx of previous PKP OD and ACIOL. Had repeat episode graft rejection last summer, resolved as of 9/16    Feels occasional itchiness and FBS OD, VA same.     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 I personally obtained the chief complaint(s) and history of present illness.  I confirmed and edited as necessary the review of systems, past medical/surgical history, family history, social history, and examination findings as documented by others.  I examined the patient myself, and I personally reviewed the relevant tests, images, and reports as documented above. I formulated and edited as necessary the assessment and plan and discussed the findings and management plan with the patient and family.     Endy Harkins MD, MA  Director, Cornea & Anterior Segment  [facility] Department of Ophthalmology & Visual Neuroscience

## 2017-10-19 NOTE — MR AVS SNAPSHOT
After Visit Summary   10/19/2017    Evelio Odell    MRN: [id number]           Patient Information     Date Of Birth          2/15/1934        Visit Information        Provider Department      10/19/2017 3:15 PM Endy Harkins MD; [facility]        Today's Diagnoses     Penetrating keratoplasty graft in place - Right Eye    -  1    Pseudophakia - Right Eye           Follow-ups after your visit        Follow-up notes from your care team     Return for general followup as scheduled .      Your next 10 appointments already scheduled     2017  2:30 PM CST   Post-Op with Endy Harkins MD   Eye Clinic ([facility])    [address]  [address]  [address]  [facility] [id number] [phone]              Who to contact     Please call your clinic at [phone] to:    Ask questions about your health    Make or cancel appointments    Discuss your medicines    Learn about your test results    Speak to your doctor   If you have compliments or concerns about an experience at your clinic, or if you wish to file a complaint, please contact [facility] Patient Relations at [phone] or email us at [email or web address]         Additional Information About Your Visit        MyChart Information     Tapterahart is an electronic gateway that provides easy, online access to your medical records. With Dayforce, you can request a clinic appointment, read your test results, renew a prescription or communicate with your care team.     To sign up for HiveLivet visit the website at [email or web address]   You will be asked to enter the access code listed below, as well as some personal information. Please follow the directions to create your username and password.     Your access code is: [email or web address]  Expires: 2017  6:30 AM     Your access code will  in 90 days. If you need help or a new code,  please contact your [facility] or call [phone] for assistance.        Care EveryWhere ID     This is your Care EveryWhere ID. This could be used by other organizations to access your [place] medical records  [id number]         Blood Pressure from Last 3 Encounters:   02/19/14 154/75   10/01/13 (!) 151/92    Weight from Last 3 Encounters:   02/19/14 56.7 kg (125 lb)   10/01/13 59 kg (130 lb)              Today, you had the following     No orders found for display       Primary Care Provider Office Phone # Fax #    [facility] [phone]       [address] [place] [address]        Equal Access to Services     JEFFERSON DAN : Hadii aad ku hadasho Soradha, waaxda luqadaha, qaybta kaalmada adeegyada, kathy varela. So [facility] [phone].    ATENCIÓN: Si habla español, tiene a camp disposición servicios gratuitos de asistencia lingüística. Llame al [phone].    We comply with applicable federal civil rights laws and [place] laws. We do not discriminate on the basis of race, color, national origin, age, disability, sex, sexual orientation, or gender identity.            Thank you!     Thank you for choosing EYE CLINIC  for your care. Our goal is always to provide you with excellent care. Hearing back from our patients is one way we can continue to improve our services. Please take a few minutes to complete the written survey that you may receive in the mail after your visit with us. Thank you!             Your Updated Medication List - Protect others around you: Learn how to safely use, store and throw away your medicines at www.disposemymeds.org.          This list is accurate as of: 10/19/17  4:34 PM.  Always use your most recent med list.                   Brand Name Dispense Instructions for use Diagnosis    albuterol 108 (90 BASE) MCG/ACT Inhaler    PROAIR HFA/PROVENTIL HFA/VENTOLIN HFA     Inhale 2 puffs into  the lungs every 4 hours as needed        artificial tears Oint ophthalmic ointment     1 Tube    Place 1 g into both eyes At Bedtime    Tear film insufficiency, bilateral       budesonide-formoterol 80-4.5 MCG/ACT Inhaler    SYMBICORT     Inhale 2 puffs into the lungs 2 times daily        difluprednate 0.05 % ophthalmic emulsion    DUREZOL    1 Bottle    Place 1 drop into the right eye 4 times daily    H/O cornea transplant, Posterior uveitis, right eye       dorzolamide-timolol 2-0.5 % ophthalmic solution    COSOPT    1 Bottle    Place 1 drop into the right eye 2 times daily    Penetrating keratoplasty graft in place       flunisolide 25 MCG/ACT (0.025%) Soln spray    NASALIDE    1 Bottle    2 sprays by Both Nostrils route every 12 hours.    ETD (eustachian tube dysfunction)       FUROSEMIDE PO      Take 40 mg by mouth daily        HYDROcodone-acetaminophen 5-325 MG per tablet    NORCO    10 tablet    Take 1 tablet by mouth every 6 hours as needed for pain Maximum of 4000 mg of acetaminophen in 24 hours.    Postoperative eye state       LEVOTHYROXINE SODIUM PO      Take 50 mcg by mouth daily        LISINOPRIL PO      Take 10 mg by mouth daily        metoprolol 25 MG tablet    LOPRESSOR     Take 25 mg by mouth daily        NITROSTAT SL      Place 0.4 mg under the tongue every 5 minutes as needed        OMEPRAZOLE PO      Take 40 mg by mouth every morning        polyvinyl alcohol 1.4 % ophthalmic solution    LIQUITEARS    15 mL    Place 1 drop into both eyes 4 times daily    Tear film insufficiency, bilateral       prednisoLONE acetate 1 % ophthalmic susp    PRED FORTE    1 Bottle    Place 1 drop into the right eye 2 times daily    Penetrating keratoplasty graft in place       tenofovir 300 MG tablet    VIREAD     Take 300 mg by mouth daily        UNKNOWN TO PATIENT      Breathing, gout, high blood pressure medications

## 2017-11-17 NOTE — MR AVS SNAPSHOT
After Visit Summary   2017    Evelio Odell    MRN: [id number]           Patient Information     Date Of Birth          2/15/1934        Visit Information        Provider Department      2017 8:30 AM Endy Harkins MD; PHONE,  Eye Clinic        Today's Diagnoses     H/O cornea transplant - Right Eye    -  1    Penetrating keratoplasty graft in place           Follow-ups after your visit        Follow-up notes from your care team     Return in about 8 weeks (around 2018) for Follow Up, Topography OD.      Your next 10 appointments already scheduled     2018  9:45 AM CST   Post-Op with Endy Harkins MD   Eye Clinic ([facility])    [address]  [address]  [address]  [facility] [address] [phone]              Who to contact     Please call your clinic at [phone] to:    Ask questions about your health    Make or cancel appointments    Discuss your medicines    Learn about your test results    Speak to your doctor   If you have compliments or concerns about an experience at your clinic, or if you wish to file a complaint, please contact [facility] Patient Relations at [phone] or email us at [email or web address]         Additional Information About Your Visit        MyChart Information     Talkot is an electronic gateway that provides easy, online access to your medical records. With HowGood, you can request a clinic appointment, read your test results, renew a prescription or communicate with your care team.     To sign up for Talkot visit the website at [email or web address]   You will be asked to enter the access code listed below, as well as some personal information. Please follow the directions to create your username and password.     Your access code is: [email or web address]  Expires: 2017  5:30 AM     Your access code will  in 90 days. If you need help or  a new code, please contact your [facility] or call [phone] for assistance.        Care EveryWhere ID     This is your Care EveryWhere ID. This could be used by other organizations to access your [place] medical records  [id number]         Blood Pressure from Last 3 Encounters:   02/19/14 154/75   10/01/13 (!) 151/92    Weight from Last 3 Encounters:   02/19/14 56.7 kg (125 lb)   10/01/13 59 kg (130 lb)              Today, you had the following     No orders found for display         Today's Medication Changes          These changes are accurate as of: 11/17/17 10:31 AM.  If you have any questions, ask your nurse or doctor.               These medicines have changed or have updated prescriptions.        Dose/Directions    prednisoLONE acetate 1 % ophthalmic susp   Commonly known as:  PRED FORTE   This may have changed:  when to take this   Used for:  Penetrating keratoplasty graft in place   Changed by:  Endy Harkins MD        Dose:  1 drop   Place 1 drop into the right eye 4 times daily   Quantity:  1 Bottle   Refills:  11            Where to get your medicines      These medications were sent to [facility] - [place], [place] - [address] [place] [address] Pkw[address]  [address], [place] [address]     Phone:  [phone]     prednisoLONE acetate 1 % ophthalmic susp                Primary Care Provider Office Phone # Fax #    [facility] [phone]       [address] [place] [address]        Equal Access to Services     JEFFERSON DAN AH: Hadii riki Caldwell, waaxda luqadaha, qaybta kaalmada [place], kathy varela. So [facility] [phone].    ATENCIÓN: Si habla español, tiene a camp disposición servicios gratuitos de asistencia lingüística. Llame al [phone].    We comply with applicable federal civil rights laws and [place] laws. We do not discriminate on the basis of race, color,  national origin, age, disability, sex, sexual orientation, or gender identity.            Thank you!     Thank you for choosing EYE CLINIC  for your care. Our goal is always to provide you with excellent care. Hearing back from our patients is one way we can continue to improve our services. Please take a few minutes to complete the written survey that you may receive in the mail after your visit with us. Thank you!             Your Updated Medication List - Protect others around you: Learn how to safely use, store and throw away your medicines at www.disposemymeds.org.          This list is accurate as of: 11/17/17 10:31 AM.  Always use your most recent med list.                   Brand Name Dispense Instructions for use Diagnosis    albuterol 108 (90 BASE) MCG/ACT Inhaler    PROAIR HFA/PROVENTIL HFA/VENTOLIN HFA     Inhale 2 puffs into the lungs every 4 hours as needed        artificial tears Oint ophthalmic ointment     1 Tube    Place 1 g into both eyes At Bedtime    Tear film insufficiency, bilateral       budesonide-formoterol 80-4.5 MCG/ACT Inhaler    SYMBICORT     Inhale 2 puffs into the lungs 2 times daily        difluprednate 0.05 % ophthalmic emulsion    DUREZOL    1 Bottle    Place 1 drop into the right eye 4 times daily    H/O cornea transplant, Posterior uveitis, right eye       dorzolamide-timolol 2-0.5 % ophthalmic solution    COSOPT    1 Bottle    Place 1 drop into the right eye 2 times daily    Penetrating keratoplasty graft in place       flunisolide 25 MCG/ACT (0.025%) Soln spray    NASALIDE    1 Bottle    2 sprays by Both Nostrils route every 12 hours.    ETD (eustachian tube dysfunction)       FUROSEMIDE PO      Take 40 mg by mouth daily        HYDROcodone-acetaminophen 5-325 MG per tablet    NORCO    10 tablet    Take 1 tablet by mouth every 6 hours as needed for pain Maximum of 4000 mg of acetaminophen in 24 hours.    Postoperative eye state       LEVOTHYROXINE SODIUM PO      Take 50 mcg by  mouth daily        LISINOPRIL PO      Take 10 mg by mouth daily        metoprolol 25 MG tablet    LOPRESSOR     Take 25 mg by mouth daily        NITROSTAT SL      Place 0.4 mg under the tongue every 5 minutes as needed        OMEPRAZOLE PO      Take 40 mg by mouth every morning        polyvinyl alcohol 1.4 % ophthalmic solution    LIQUITEARS    15 mL    Place 1 drop into both eyes 4 times daily    Tear film insufficiency, bilateral       prednisoLONE acetate 1 % ophthalmic susp    PRED FORTE    1 Bottle    Place 1 drop into the right eye 4 times daily    Penetrating keratoplasty graft in place       tenofovir 300 MG tablet    VIREAD     Take 300 mg by mouth daily        UNKNOWN TO PATIENT      Breathing, gout, high blood pressure medications

## 2017-11-17 NOTE — NURSING NOTE
Chief Complaints and History of Present Illnesses   Patient presents with     Post Op (Ophthalmology) Right Eye     PKP     HPI    Affected eye(s):  Right   Symptoms:        Frequency:  Constant       Do you have eye pain now?:  No      Comments:  States that the va is still blurry  +itchy   +watery  Shannon Marshall COT 9:21 AM November 17, 2017

## 2017-11-17 NOTE — PROGRESS NOTES
CC: Postoperative month#1 PKP, Right Eye     HPI: Patient with hx of previous PKP OD and ACIOL. Had repeat episode graft rejection last summer, resolved as of 9/16    Reports vision still blurry, intermittent irritation and foreign body sensation. Denies eye pain, flashes, floaters.    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 I personally obtained the chief complaint(s) and history of present illness.  I confirmed and edited as necessary the review of systems, past medical/surgical history, family history, social history, and examination findings as documented by others.  I examined the patient myself, and I personally reviewed the relevant tests, images, and reports as documented above. I formulated and edited as necessary the assessment and plan and discussed the findings and management plan with the patient and family.     Endy Harkins MD, MA  Director, Cornea & Anterior Segment  [facility] Department of Ophthalmology & Visual Neuroscience

## 2018-01-01 ENCOUNTER — OFFICE VISIT - HEALTHEAST (OUTPATIENT)
Dept: ONCOLOGY | Facility: CLINIC | Age: 83
End: 2018-01-01

## 2018-01-01 ENCOUNTER — OFFICE VISIT - HEALTHEAST (OUTPATIENT)
Dept: FAMILY MEDICINE | Facility: CLINIC | Age: 83
End: 2018-01-01

## 2018-01-01 ENCOUNTER — HOME CARE/HOSPICE - HEALTHEAST (OUTPATIENT)
Dept: HOSPICE | Facility: HOSPICE | Age: 83
End: 2018-01-01

## 2018-01-01 ENCOUNTER — RECORDS - HEALTHEAST (OUTPATIENT)
Dept: RADIOLOGY | Facility: CLINIC | Age: 83
End: 2018-01-01

## 2018-01-01 ENCOUNTER — COMMUNICATION - HEALTHEAST (OUTPATIENT)
Dept: NURSING | Facility: CLINIC | Age: 83
End: 2018-01-01

## 2018-01-01 ENCOUNTER — AMBULATORY - HEALTHEAST (OUTPATIENT)
Dept: HOSPICE | Facility: HOSPICE | Age: 83
End: 2018-01-01

## 2018-01-01 ENCOUNTER — COMMUNICATION - HEALTHEAST (OUTPATIENT)
Dept: FAMILY MEDICINE | Facility: CLINIC | Age: 83
End: 2018-01-01

## 2018-01-01 ENCOUNTER — AMBULATORY - HEALTHEAST (OUTPATIENT)
Dept: RADIATION ONCOLOGY | Facility: HOSPITAL | Age: 83
End: 2018-01-01

## 2018-01-01 ENCOUNTER — COMMUNICATION - HEALTHEAST (OUTPATIENT)
Dept: ONCOLOGY | Facility: HOSPITAL | Age: 83
End: 2018-01-01

## 2018-01-01 ENCOUNTER — RECORDS - HEALTHEAST (OUTPATIENT)
Dept: ADMINISTRATIVE | Facility: OTHER | Age: 83
End: 2018-01-01

## 2018-01-01 ENCOUNTER — OFFICE VISIT - HEALTHEAST (OUTPATIENT)
Dept: RADIATION ONCOLOGY | Facility: HOSPITAL | Age: 83
End: 2018-01-01

## 2018-01-01 ENCOUNTER — COMMUNICATION - HEALTHEAST (OUTPATIENT)
Dept: ADMINISTRATIVE | Facility: HOSPITAL | Age: 83
End: 2018-01-01

## 2018-01-01 ENCOUNTER — AMBULATORY - HEALTHEAST (OUTPATIENT)
Dept: ONCOLOGY | Facility: HOSPITAL | Age: 83
End: 2018-01-01

## 2018-01-01 ENCOUNTER — OFFICE VISIT - HEALTHEAST (OUTPATIENT)
Dept: RADIATION ONCOLOGY | Facility: CLINIC | Age: 83
End: 2018-01-01

## 2018-01-01 ENCOUNTER — OFFICE VISIT - HEALTHEAST (OUTPATIENT)
Dept: CARDIOLOGY | Facility: CLINIC | Age: 83
End: 2018-01-01

## 2018-01-01 ENCOUNTER — OFFICE VISIT (OUTPATIENT)
Dept: OPHTHALMOLOGY | Facility: CLINIC | Age: 83
End: 2018-01-01
Attending: OPHTHALMOLOGY
Payer: COMMERCIAL

## 2018-01-01 ENCOUNTER — AMBULATORY - HEALTHEAST (OUTPATIENT)
Dept: FAMILY MEDICINE | Facility: CLINIC | Age: 83
End: 2018-01-01

## 2018-01-01 DIAGNOSIS — K74.60 CIRRHOSIS OF LIVER DUE TO HEPATITIS B (H): ICD-10-CM

## 2018-01-01 DIAGNOSIS — C79.51 HEPATOCELLULAR CARCINOMA METASTATIC TO BONE (H): ICD-10-CM

## 2018-01-01 DIAGNOSIS — M1A.9XX1 GOUT TOPHI: ICD-10-CM

## 2018-01-01 DIAGNOSIS — Z94.7 PENETRATING KERATOPLASTY GRAFT IN PLACE: ICD-10-CM

## 2018-01-01 DIAGNOSIS — Z09 HOSPITAL DISCHARGE FOLLOW-UP: ICD-10-CM

## 2018-01-01 DIAGNOSIS — B18.1 VIRAL HEPATITIS B CHRONIC (H): ICD-10-CM

## 2018-01-01 DIAGNOSIS — C22.0 HCC (HEPATOCELLULAR CARCINOMA) (H): ICD-10-CM

## 2018-01-01 DIAGNOSIS — B19.10 CIRRHOSIS OF LIVER DUE TO HEPATITIS B (H): ICD-10-CM

## 2018-01-01 DIAGNOSIS — M84.48XA PATHOLOGICAL FRACTURE OF RIB, INITIAL ENCOUNTER: ICD-10-CM

## 2018-01-01 DIAGNOSIS — L03.90 CELLULITIS: ICD-10-CM

## 2018-01-01 DIAGNOSIS — C22.0 HEPATOCELLULAR CARCINOMA METASTATIC TO BONE (H): ICD-10-CM

## 2018-01-01 DIAGNOSIS — E11.21 TYPE 2 DIABETES MELLITUS WITH DIABETIC NEPHROPATHY, WITHOUT LONG-TERM CURRENT USE OF INSULIN (H): ICD-10-CM

## 2018-01-01 DIAGNOSIS — E11.9 TYPE 2 DIABETES MELLITUS (H): ICD-10-CM

## 2018-01-01 DIAGNOSIS — Z87.09 HISTORY OF COPD: ICD-10-CM

## 2018-01-01 DIAGNOSIS — R77.2 ELEVATED AFP: ICD-10-CM

## 2018-01-01 DIAGNOSIS — S05.8X1A SUPERFICIAL INJURY OF RIGHT CORNEA, INITIAL ENCOUNTER: Primary | ICD-10-CM

## 2018-01-01 DIAGNOSIS — Z85.05 HISTORY OF HEPATOCELLULAR CARCINOMA: ICD-10-CM

## 2018-01-01 DIAGNOSIS — J44.9 COPD (CHRONIC OBSTRUCTIVE PULMONARY DISEASE) (H): ICD-10-CM

## 2018-01-01 DIAGNOSIS — I25.10 ATHEROSCLEROSIS OF NATIVE CORONARY ARTERY OF NATIVE HEART WITHOUT ANGINA PECTORIS: ICD-10-CM

## 2018-01-01 DIAGNOSIS — Z94.7 H/O CORNEA TRANSPLANT: Primary | ICD-10-CM

## 2018-01-01 DIAGNOSIS — R07.9 CHEST PAIN: ICD-10-CM

## 2018-01-01 DIAGNOSIS — M1A.9XX1 GOUTY TOPHUS: ICD-10-CM

## 2018-01-01 DIAGNOSIS — E03.4 HYPOTHYROIDISM DUE TO ACQUIRED ATROPHY OF THYROID: ICD-10-CM

## 2018-01-01 DIAGNOSIS — M10.00 IDIOPATHIC GOUT, UNSPECIFIED CHRONICITY, UNSPECIFIED SITE: ICD-10-CM

## 2018-01-01 DIAGNOSIS — I10 HYPERTENSION: ICD-10-CM

## 2018-01-01 DIAGNOSIS — Z01.818 PREOP EXAMINATION: ICD-10-CM

## 2018-01-01 DIAGNOSIS — R11.0 NAUSEA: ICD-10-CM

## 2018-01-01 DIAGNOSIS — H47.20 RIGHT OPTIC NERVE ATROPHY: ICD-10-CM

## 2018-01-01 DIAGNOSIS — I50.33 ACUTE ON CHRONIC DIASTOLIC CONGESTIVE HEART FAILURE (H): ICD-10-CM

## 2018-01-01 LAB
ANION GAP SERPL CALCULATED.3IONS-SCNC: 10 MMOL/L (ref 5–18)
ANION GAP SERPL CALCULATED.3IONS-SCNC: 11 MMOL/L (ref 5–18)
ATRIAL RATE - MUSE: 62 BPM
BUN SERPL-MCNC: 35 MG/DL (ref 8–28)
BUN SERPL-MCNC: 39 MG/DL (ref 8–28)
CALCIUM SERPL-MCNC: 10.2 MG/DL (ref 8.5–10.5)
CALCIUM SERPL-MCNC: 9.4 MG/DL (ref 8.5–10.5)
CHLORIDE BLD-SCNC: 107 MMOL/L (ref 98–107)
CHLORIDE BLD-SCNC: 109 MMOL/L (ref 98–107)
CO2 SERPL-SCNC: 22 MMOL/L (ref 22–31)
CO2 SERPL-SCNC: 25 MMOL/L (ref 22–31)
CREAT SERPL-MCNC: 1.56 MG/DL (ref 0.7–1.3)
CREAT SERPL-MCNC: 2.34 MG/DL (ref 0.7–1.3)
DIASTOLIC BLOOD PRESSURE - MUSE: NORMAL MMHG
ERYTHROCYTE [DISTWIDTH] IN BLOOD BY AUTOMATED COUNT: 11.9 % (ref 11–14.5)
GFR SERPL CREATININE-BSD FRML MDRD: 27 ML/MIN/1.73M2
GFR SERPL CREATININE-BSD FRML MDRD: 43 ML/MIN/1.73M2
GLUCOSE BLD-MCNC: 125 MG/DL (ref 70–125)
GLUCOSE BLD-MCNC: 134 MG/DL (ref 70–125)
HBA1C MFR BLD: 6.3 % (ref 3.5–6)
HCT VFR BLD AUTO: 47.6 % (ref 40–54)
HGB BLD-MCNC: 15.7 G/DL (ref 14–18)
INR PPP: 1 (ref 0.9–1.1)
INTERPRETATION ECG - MUSE: NORMAL
MCH RBC QN AUTO: 31.1 PG (ref 27–34)
MCHC RBC AUTO-ENTMCNC: 32.9 G/DL (ref 32–36)
MCV RBC AUTO: 95 FL (ref 80–100)
P AXIS - MUSE: 68 DEGREES
PLATELET # BLD AUTO: 206 THOU/UL (ref 140–440)
PMV BLD AUTO: 7.4 FL (ref 7–10)
POTASSIUM BLD-SCNC: 4.4 MMOL/L (ref 3.5–5)
POTASSIUM BLD-SCNC: 4.8 MMOL/L (ref 3.5–5)
PR INTERVAL - MUSE: 152 MS
QRS DURATION - MUSE: 94 MS
QT - MUSE: 398 MS
QTC - MUSE: 403 MS
R AXIS - MUSE: 78 DEGREES
RBC # BLD AUTO: 5.04 MILL/UL (ref 4.4–6.2)
SODIUM SERPL-SCNC: 141 MMOL/L (ref 136–145)
SODIUM SERPL-SCNC: 143 MMOL/L (ref 136–145)
SYSTOLIC BLOOD PRESSURE - MUSE: NORMAL MMHG
T AXIS - MUSE: 99 DEGREES
VENTRICULAR RATE- MUSE: 62 BPM
WBC: 9.5 THOU/UL (ref 4–11)

## 2018-01-01 PROCEDURE — G0463 HOSPITAL OUTPT CLINIC VISIT: HCPCS | Mod: ZF

## 2018-01-01 PROCEDURE — 92134 CPTRZ OPH DX IMG PST SGM RTA: CPT | Mod: ZF | Performed by: OPHTHALMOLOGY

## 2018-01-01 PROCEDURE — 92133 CPTRZD OPH DX IMG PST SGM ON: CPT | Mod: ZF | Performed by: OPHTHALMOLOGY

## 2018-01-01 PROCEDURE — T1013 SIGN LANG/ORAL INTERPRETER: HCPCS | Mod: U3,ZF

## 2018-01-01 RX ORDER — PREDNISOLONE ACETATE 10 MG/ML
1 SUSPENSION/ DROPS OPHTHALMIC 3 TIMES DAILY
Qty: 1 BOTTLE | Refills: 11 | Status: SHIPPED | OUTPATIENT
Start: 2018-01-01

## 2018-01-01 RX ORDER — DORZOLAMIDE HYDROCHLORIDE AND TIMOLOL MALEATE 20; 5 MG/ML; MG/ML
1 SOLUTION/ DROPS OPHTHALMIC 2 TIMES DAILY
Qty: 1 BOTTLE | Refills: 11 | OUTPATIENT
Start: 2018-01-01

## 2018-01-01 RX ORDER — ERYTHROMYCIN 5 MG/G
1 OINTMENT OPHTHALMIC 3 TIMES DAILY
Qty: 1 TUBE | Refills: 3 | Status: SHIPPED | OUTPATIENT
Start: 2018-01-01

## 2018-01-01 ASSESSMENT — VISUAL ACUITY
OD_SC: HM
OS_PH_SC: 20/40
OS_SC: 20/60
METHOD: SNELLEN - LINEAR
OD_PH_SC: 20/400

## 2018-01-01 ASSESSMENT — EXTERNAL EXAM - RIGHT EYE: OD_EXAM: NORMAL

## 2018-01-01 ASSESSMENT — MIFFLIN-ST. JEOR
SCORE: 1016.92
SCORE: 1011.93

## 2018-01-01 ASSESSMENT — CONF VISUAL FIELD
OS_SUPERIOR_NASAL_RESTRICTION: 3
OD_SUPERIOR_NASAL_RESTRICTION: 1
OD_INFERIOR_NASAL_RESTRICTION: 1
OD_SUPERIOR_TEMPORAL_RESTRICTION: 1
OS_SUPERIOR_TEMPORAL_RESTRICTION: 3
OD_INFERIOR_TEMPORAL_RESTRICTION: 1
OS_INFERIOR_TEMPORAL_RESTRICTION: 3
OS_INFERIOR_NASAL_RESTRICTION: 3

## 2018-01-01 ASSESSMENT — TONOMETRY
OD_IOP_MMHG: 09
IOP_METHOD: ICARE
OS_IOP_MMHG: 11

## 2018-01-01 ASSESSMENT — CUP TO DISC RATIO: OD_RATIO: 0.3

## 2018-01-01 ASSESSMENT — EXTERNAL EXAM - LEFT EYE: OS_EXAM: MILD BROW PTOSIS

## 2018-01-25 NOTE — NURSING NOTE
Chief Complaints and History of Present Illnesses   Patient presents with     Follow Up For     2 month follow up PKP RE     HPI    Symptoms:     Foreign body sensation (Comment: RE)   Itching (Comment: RE)   No burning   No eye discharge      Duration:  2 months      Do you have eye pain now?:  Yes   Location:  OD   Pain Level:  Severe Pain (6)   Pain Duration:  2 months   Pain Frequency:  Constant   Pain Characteristics:  Sharp/Quick      Comments:  RE feels like hair in eye  Painful ,itchy FB sensation X 2 months  Prednisolone ran out several days ago  Refresh ranges from2x per day to hourly    Winterjason Lee COA 1:18 PM January 25, 2018

## 2018-01-25 NOTE — PROGRESS NOTES
CC: Postoperative month#3 PKP, Right Eye     HPI: Patient with hx of previous PKP OD and ACIOL. Had repeat episode graft rejection last summer, resolved as of 9/16    Reports vision still blurry and that he has significant FBS OD.  No change in vision that he has noted.     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 Winchester for visual potential test with contact    Complete documentation of historical and exam elements from today's encounter can be found in the full encounter summary report (not reduplicated in this progress note). I personally obtained the chief complaint(s) and history of present illness.  I confirmed and edited as necessary the review of systems, past medical/surgical history, family history, social history, and examination findings as documented by others; and I examined the patient myself. I personally reviewed the relevant tests, images, and reports as documented above. I formulated and edited as necessary the assessment and plan and discussed the findings  and management plan with the patient and family.     Vitaliy Burrows, DO

## 2018-01-25 NOTE — MR AVS SNAPSHOT
After Visit Summary   2018    Evelio Odell    MRN: [id number]           Patient Information     Date Of Birth          2/15/1934        Visit Information        Provider Department      2018 1:00 PM Sybil Perez; Mirian Hollingsworth MD [facility]        Today's Diagnoses     Superficial injury of right cornea, initial encounter    -  1    Penetrating keratoplasty graft in place           Follow-ups after your visit        Follow-up notes from your care team     Return in about 4 weeks (around 2018).      Your next 10 appointments already scheduled     Mar 06, 2018 10:00 AM CST   RETURN CORNEA with Endy Harkins MD   Eye Clinic ([facility])    [address]  [address]  9[address] Clin [address]  [facility] [address] [phone]              Who to contact     Please call your clinic at [phone] to:    Ask questions about your health    Make or cancel appointments    Discuss your medicines    Learn about your test results    Speak to your doctor   If you have compliments or concerns about an experience at your clinic, or if you wish to file a complaint, please contact [facility] Patient Relations at [phone] or email us at [email or web address]         Additional Information About Your Visit        MyChart Information     Artilleryt is an electronic gateway that provides easy, online access to your medical records. With Jaeger, you can request a clinic appointment, read your test results, renew a prescription or communicate with your care team.     To sign up for Artilleryt visit the website at [email or web address]   You will be asked to enter the access code listed below, as well as some personal information. Please follow the directions to create your username and password.     Your access code is: [email or web address]  Expires: 2018  6:30 AM     Your access code will  in 90 days. If you need help or a new code,  please contact your [facility] or call [phone] for assistance.        Care EveryWhere ID     This is your Care EveryWhere ID. This could be used by other organizations to access your Donna medical records  [id number]         Blood Pressure from Last 3 Encounters:   02/19/14 154/75   10/01/13 (!) 151/92    Weight from Last 3 Encounters:   02/19/14 56.7 kg (125 lb)   10/01/13 59 kg (130 lb)              We Performed the Following     OCT Optic Nerve RNFL Spectralis OD (right eye)     OCT Retina Spectralis OD (right eye)          Today's Medication Changes          These changes are accurate as of 1/25/18  2:46 PM.  If you have any questions, ask your nurse or doctor.               Start taking these medicines.        Dose/Directions    erythromycin ophthalmic ointment   Commonly known as:  ROMYCIN   Used for:  Superficial injury of right cornea, initial encounter   Started by:  Mirian Hollingsworth MD        Dose:  1 Application   Place 1 Application into the right eye 3 times daily   Quantity:  1 Tube   Refills:  3         These medicines have changed or have updated prescriptions.        Dose/Directions    prednisoLONE acetate 1 % ophthalmic susp   Commonly known as:  PRED FORTE   This may have changed:  when to take this   Used for:  Penetrating keratoplasty graft in place   Changed by:  Mirian Hollingsworth MD        Dose:  1 drop   Place 1 drop into the right eye 3 times daily   Quantity:  1 Bottle   Refills:  11            Where to get your medicines      These medications were sent to [facility] - [place], [place] - [address]  [address], [place] [address]     Phone:  [phone]     erythromycin ophthalmic ointment    prednisoLONE acetate 1 % ophthalmic susp                Primary Care Provider Office Phone # Fax #    [facility] [phone]       [address] [place] [address] [employer]  Access to Services     [facility]: Hadii aad ku hadbessy Caldwell, walaureenda luqadaha, qarajendra kananokathy abrahampriyankbelkis varela. So [facility] [phone].    ATENCIÓN: Si diannala petros, tiene a camp disposición servicios gratuitos de asistencia lingüística. Llame al [phone].    We comply with applicable federal civil rights laws and [place] laws. We do not discriminate on the basis of race, color, national origin, age, disability, sex, sexual orientation, or gender identity.            Thank you!     Thank you for choosing EYE CLINIC  for your care. Our goal is always to provide you with excellent care. Hearing back from our patients is one way we can continue to improve our services. Please take a few minutes to complete the written survey that you may receive in the mail after your visit with us. Thank you!             Your Updated Medication List - Protect others around you: Learn how to safely use, store and throw away your medicines at www.disposemymeds.org.          This list is accurate as of 1/25/18  2:46 PM.  Always use your most recent med list.                   Brand Name Dispense Instructions for use Diagnosis    albuterol 108 (90 BASE) MCG/ACT Inhaler    PROAIR HFA/PROVENTIL HFA/VENTOLIN HFA     Inhale 2 puffs into the lungs every 4 hours as needed        artificial tears Oint ophthalmic ointment     1 Tube    Place 1 g into both eyes At Bedtime    Tear film insufficiency, bilateral       budesonide-formoterol 80-4.5 MCG/ACT Inhaler    SYMBICORT     Inhale 2 puffs into the lungs 2 times daily        difluprednate 0.05 % ophthalmic emulsion    DUREZOL    1 Bottle    Place 1 drop into the right eye 4 times daily    H/O cornea transplant, Posterior uveitis, right eye       dorzolamide-timolol 2-0.5 % ophthalmic solution    COSOPT    1 Bottle    Place 1 drop into the right eye 2 times daily    Penetrating keratoplasty graft in place       erythromycin ophthalmic ointment     ROMYCIN    1 Tube    Place 1 Application into the right eye 3 times daily    Superficial injury of right cornea, initial encounter       flunisolide 25 MCG/ACT (0.025%) Soln spray    NASALIDE    1 Bottle    2 sprays by Both Nostrils route every 12 hours.    ETD (eustachian tube dysfunction)       FUROSEMIDE PO      Take 40 mg by mouth daily        HYDROcodone-acetaminophen 5-325 MG per tablet    NORCO    10 tablet    Take 1 tablet by mouth every 6 hours as needed for pain Maximum of 4000 mg of acetaminophen in 24 hours.    Postoperative eye state       LEVOTHYROXINE SODIUM PO      Take 50 mcg by mouth daily        LISINOPRIL PO      Take 10 mg by mouth daily        metoprolol tartrate 25 MG tablet    LOPRESSOR     Take 25 mg by mouth daily        NITROSTAT SL      Place 0.4 mg under the tongue every 5 minutes as needed        OMEPRAZOLE PO      Take 40 mg by mouth every morning        polyvinyl alcohol 1.4 % ophthalmic solution    LIQUITEARS    15 mL    Place 1 drop into both eyes 4 times daily    Tear film insufficiency, bilateral       prednisoLONE acetate 1 % ophthalmic susp    PRED FORTE    1 Bottle    Place 1 drop into the right eye 3 times daily    Penetrating keratoplasty graft in place       tenofovir 300 MG tablet    VIREAD     Take 300 mg by mouth daily        UNKNOWN TO PATIENT      Breathing, gout, high blood pressure medications

## 2018-04-18 NOTE — TELEPHONE ENCOUNTER
Pt's intraocular pressure ok with cosopt on hold.  No refills necessary.  Asked pt/family to f/u with dr. Harkins-- overdue for appt  Family stated has eye appointment closer to home and will f/u eye care there.  Reviewed eye drop refill will be thru new provider managing eye care.  Reviewed may call if wishes to come to clinic or referred back    Note to dr. Shahana Teresa RN 9:23 AM 04/18/18

## 2018-08-14 ENCOUNTER — COMMUNICATION - HEALTHEAST (OUTPATIENT)
Dept: NURSING | Facility: CLINIC | Age: 83
End: 2018-08-14

## 2021-05-30 VITALS — BODY MASS INDEX: 23.41 KG/M2 | WEIGHT: 112 LBS

## 2021-05-30 VITALS — BODY MASS INDEX: 23.16 KG/M2 | HEIGHT: 60 IN | WEIGHT: 118 LBS

## 2021-05-31 VITALS — WEIGHT: 113 LBS | BODY MASS INDEX: 23.62 KG/M2

## 2021-05-31 VITALS — HEIGHT: 60 IN | WEIGHT: 112 LBS | BODY MASS INDEX: 21.99 KG/M2

## 2021-05-31 VITALS — BODY MASS INDEX: 21.6 KG/M2 | HEIGHT: 60 IN | WEIGHT: 110 LBS

## 2021-05-31 VITALS — BODY MASS INDEX: 21.79 KG/M2 | HEIGHT: 60 IN | WEIGHT: 111 LBS

## 2021-05-31 VITALS — WEIGHT: 109 LBS | HEIGHT: 60 IN | BODY MASS INDEX: 21.4 KG/M2

## 2021-05-31 VITALS — BODY MASS INDEX: 20.3 KG/M2 | WEIGHT: 111 LBS

## 2021-05-31 VITALS — BODY MASS INDEX: 23.3 KG/M2 | WEIGHT: 111.5 LBS

## 2021-05-31 VITALS — BODY MASS INDEX: 21.99 KG/M2 | WEIGHT: 112 LBS | HEIGHT: 60 IN

## 2021-05-31 VITALS — WEIGHT: 113 LBS | HEIGHT: 60 IN | BODY MASS INDEX: 22.19 KG/M2

## 2021-06-01 VITALS — HEIGHT: 60 IN | BODY MASS INDEX: 22.78 KG/M2 | WEIGHT: 116 LBS

## 2021-06-01 VITALS — WEIGHT: 117 LBS | BODY MASS INDEX: 24.45 KG/M2

## 2021-06-01 VITALS — WEIGHT: 115.4 LBS | BODY MASS INDEX: 24.12 KG/M2

## 2021-06-01 VITALS — WEIGHT: 114.9 LBS | BODY MASS INDEX: 22.56 KG/M2 | HEIGHT: 60 IN

## 2021-06-01 VITALS — BODY MASS INDEX: 25.39 KG/M2 | WEIGHT: 121.5 LBS

## 2021-06-01 VITALS — WEIGHT: 123 LBS | BODY MASS INDEX: 25.71 KG/M2

## 2021-06-01 VITALS — WEIGHT: 116.1 LBS | BODY MASS INDEX: 24.26 KG/M2

## 2021-06-01 VITALS — WEIGHT: 118.2 LBS | BODY MASS INDEX: 24.7 KG/M2

## 2021-06-01 VITALS — BODY MASS INDEX: 24.45 KG/M2 | WEIGHT: 117 LBS

## 2021-06-08 NOTE — PROGRESS NOTES
[facility] [phone]. Form scan into media tab.

## 2021-06-08 NOTE — PROGRESS NOTES
Medication Therapy Management Initial Visit       ASSESSMENT AND PLAN    CHF/HTN: controlled. Safety concerns with patient self-adjusting BP medication  -Continue lisinopril 10 mg daily  -Continue metoprolol XL 25 mg daily - patient instructed to let clinic know if BPs become elevated so we can safely adjust. Instructed to not take more than prescribed.   -Continue ASA and statin    Pain: improved. APAP #3 is not ideal due to renal and hepatic dysfunction, however, he seems to be tolerating low dose without issue. Unclear indication for gabapentin, no s/s neuropathic pain.   -Continue APAP #3 PRN  -Refilled voltaren gel  -Stop gabapentin per discussion with Dr. Shea  -[facility] re: Tens unit    CKD: CrCl today 23.5 mL/min.   -Patient instructed to decrease Viread to q72 hrs as previously directed    Gout: uncontrolled, but now is adherent  -Recheck uric acid at follow up (patient declines this today)    Vitamin D Deficiency: due for level  -Vitamin D level at follow up (patient declines blood draw today)    COPD: Uncontrolled due to poor inhaler technique and misunderstanding of instructions  -Instructed about appropriate use  -Given spacer  -Patient instructed to take Symbicort 2 puffs BID rather than PRN  -Follow up control in 1 month    HLD: Has tolerated statin without issue, and he may benefit in setting of CAD, however, indication is less clear since patient > 75. Will discuss need for continued use with PCP    Depression: uncontrolled. Will continue without pharmacotherapy per patient request.     Hypothyroidism: controlled.  -Continue levothyroxine    GERD: controlled with omeprazole. Reasonable to trial off of omeprazole per patient request    FOLLOW-UP PLAN  Evelio was advised to follow up in 1 month        SUBJECTIVE AND OBJECTIVE  Evelio Odell is a 82 y.o. male who was referred by Chandan Shea MD for MTM services.  Evelio's chief concern today is follow up med  "management and medication reconciliation.   is accompanied by a [facility]  and his son. They brought all meds in today.     BP: 125/60. EF 55%. Took meds this AM. No headache, dizziness, or blurry vision. LDL 49 in 2013 on atorvastatin 10 mg/day. Bottle of metoprolol contains half-tablets. Patient admits to taking \"either 1 or 1.5 tablets\" of metoprolol every day depending on his BP. Will take the extra half-tablet if his BP is systolic > 140. Generally does this ~ 2 days each week. Took \"just one\" metoprolol tablet this morning plus 10 mg lisinopril.     He would like to stop omeprazole. Doesn't often experience heartburn or GI distress.     Symbicort \"as needed.\" He identified this from a photograph. Difficulty breathing several times per week. Uses Spiriva daily. Able to demonstrate appropriate use. Uses albuterol nebulizer once per day and then every four hours as needed. Inhaler technique is poor.     Uric acid 8.2%. Recently restarted allopurinol    No pain at all right now, but pain does persist at night and causes difficulty with sleep. Pain is located in neck and lower back. APAP #3 PRN at night - generally takes 2 every night. Only takes at night. 2 at a time. Does not use voltaren gel and does not have this. Will occasionally take additional tylenol - no more than 2 additional per day. Would not like to use APAP #3 long term. Taking gabapentin 100 mg daily which was prescribed by the spine center. Does not know if it is helpful.     Cannot remember if he takes his meds sometime due to memory loss. Son sets up in a machine that reminds him to take which has helped significantly. Son keeps track of refills.     Has not yet adjusted tenofovir dosing as directed.     We reviewed Evelio's medication list with them, discussing reason for use, directions for use, and potential side effects of each medication.  Indication, safety, efficacy, and convenience was assessed for all medications. No DDIs " "noted    Current Outpatient Prescriptions   Medication Sig Dispense Refill     acetaminophen-codeine (TYLENOL #3) 300-30 mg per tablet Take 1-2 tablets by mouth every 6 (six) hours as needed for pain. 75 tablet 2     albuterol (PROVENTIL HFA;VENTOLIN HFA) 90 mcg/actuation inhaler Inhale 2 puffs every 4 (four) hours as needed for wheezing.       albuterol (PROVENTIL) 2.5 mg /3 mL (0.083 %) nebulizer solution NEBULIZE 1 VIAL (2.5 MG TOTAL) EVERY 4 HOURS AS NEEDED FOR WHEEZING OR SHORTNESS OF BREATH 225 mL 3     allopurinol (ZYLOPRIM) 100 MG tablet Take 1 tablet (100 mg total) by mouth daily. 90 tablet 0     aspirin 81 MG EC tablet Take 1 tablet (81 mg total) by mouth daily. 90 tablet 3     atorvastatin (LIPITOR) 10 MG tablet Take 1 tablet (10 mg total) by mouth daily. 90 tablet 3     blood glucose test (GLUCOSE BLOOD) Strp Check blood glucose 2 (two) times daily 200 strip 2     brimonidine (ALPHAGAN) 0.2 % ophthalmic solution Administer 1 drop to the right eye 2 (two) times a day.       budesonide-formoterol (SYMBICORT) 80-4.5 mcg/actuation inhaler Inhale 2 puffs 2 (two) times a day. Rinse mouth after use       CHOLECALCIFEROL 1,000 unit tablet Take 1,000 Units by mouth daily.        diclofenac sodium (VOLTAREN) 1 % Gel Apply 1 application topically 2 (two) times a day as needed. Each application should be 2-4 grams. 1 Tube 3     dorzolamide-timolol (COSOPT) 22.3-6.8 mg/mL ophthalmic solution        furosemide (LASIX) 40 MG tablet Take 40 mg by mouth daily.       gabapentin (NEURONTIN) 100 MG capsule Take 100 mg by mouth bedtime. 30 capsule 3     GLUCERNA SHAKE Liqd DRINK 1 CAN BY MOUTH 3 TIMES A DAY FOR MALNUTRITION/ TXHUA HNUB HAUS 1 POOM 3 ZAUG 21413 mL 11     INJECT EASE LANCETS 30 gauge Misc USED AS DIRECTED. 100 each 3     insulin needles, disposable, (BD ULTRA-FINE MARVIN PEN NEEDLES) 32 x 5/32 \" Ndle Use As Directed. Use as directed.       levothyroxine (SYNTHROID, LEVOTHROID) 50 MCG tablet TAKE 1 PILL BY MOUTH " EVERY DAY /TXHUA HNUB NOJ 1 LUB TSHUAJ PAB JC THYROID 30 tablet 11     lisinopril (PRINIVIL,ZESTRIL) 10 MG tablet TAKE 1 PILL BY MOUTH DAILY/ TXHUA HNUB NOJ 1 LUB TSHUAJ PAB JC NTSHAV SIAB 90 tablet 1     metoprolol succinate (TOPROL-XL) 25 MG Take 1 tablet (25 mg total) by mouth daily. 90 tablet 3     miscellaneous medical supply (BLOOD PRESSURE CUFF) Misc Check blood pressure daily as directed. 1 each 0     nitroglycerin (NITROSTAT) 0.4 MG SL tablet Place 0.4 mg under the tongue every 5 (five) minutes as needed for chest pain.       nut.tx.gluc.intol,lac-free,soy (GLUCERNA SHAKE) Liqd Take 1 Can by mouth daily.       omeprazole (PRILOSEC) 40 MG capsule Take 40 mg by mouth daily.       polyvinyl alcohol (LIQUITEARS) 1.4 % ophthalmic solution Administer 1 drop to both eyes 4 (four) times a day as needed.        prednisoLONE acetate (PRED-FORTE) 1 % ophthalmic suspension Administer 1 drop to the right eye 2 (two) times a day. Indications: Severe Ocular Inflammation       SPIRIVA WITH HANDIHALER 18 mcg inhalation capsule INHALE THE CONTENTS OF 1 CAPSULE DAILY/ TXHUA HNUB SIV LUB NQUS PA NQUS 1 LUB TSHUAJ 90 capsule 1     SYMBICORT 80-4.5 mcg/actuation inhaler INHALE 2 PUFFS BY MOUTH 2 (TWO) TIMES A DAY. RINSE MOUTH AFTER USE/ TXHUA HNUB NQUS 2 PA OB ZAUG. YAUG NCAUJ RIKI QAB SIV TAS 1 Inhaler 6     tenofovir (VIREAD) 300 mg tablet Take 300 mg by mouth every third day.       tiotropium (SPIRIVA) 18 mcg inhalation capsule Place 18 mcg into inhaler and inhale daily.        No current facility-administered medications for this visit.        Evelio was provided with a printed AVS, and this care plan was communicated via EMR with his primary care provider, Chandan Shea MD, and is the authorizing prescriber for this visit.  Direct supervision was available by either the patient's PCP or another available physician when needed.    Time spent: 60 minutes    Sherlyn Laws PharmD  MTM Pharmacist at [facility]inics

## 2021-06-08 NOTE — PROGRESS NOTES
Medication Therapy Management Initial Visit       ASSESSMENT AND PLAN    Pain: improved. APAP #3 is not ideal due to renal and hepatic dysfunction, however, he seems to be tolerating low dose without issue. If symptoms worsen, would recommend consideration of alternative pain control  -Continue APAP #3 PRN  -Continue voltaren gel  -Continue gabapentin    CKD: CrCl today 23.5 mL/min.   -Consider dose decrease of viread to 300 mg q72 h. Will reach out to GI provider to make this change.    Gout: uncontrolled, but now is adherent  -Recheck uric acid at follow up    COPD: controlled today. Use of inhalers not entirely clear.   -Bring all inhalers to follow up - will re-assess technique and efficacy    CHF/HTN: BP trending down, possibly due to better pain control.   -Continue bb, statin, aspirin, and ACE.     HLD: Has tolerated statin without issue, and he may benefit in setting of CAD, however, indication is less clear since patient > 75. Will discuss need for continued use with PCP    Depression: uncontrolled. Will continue without pharmacotherapy per patient request. Will discuss further at follow up    Hypothyroidism: controlled. ? adherence  -TSH    FOLLOW-UP PLAN  Evelio was advised to follow up in 3 weeks with son. BRING IN ALL MEDS        SUBJECTIVE AND OBJECTIVE  Evelio Odell is a 82 y.o. male who was referred by Chandan Shea MD for MTM services.  Evelio's chief concern today is follow up med management and medication reconciliation.   is accompanied by a [employer] , his son was not present and he did not bring in meds    No pain at all right now, but pain does persist at night and causes difficulty with sleep. Pain is located in neck and lower back. APAP #3 PRN at night - generally takes 2 every night. Only takes at night. 2 at a time. Does not use voltaren gel and it is unclear if he actually has this. Taking gabapentin 100 mg daily    Has not recently filled escitalopram  "- per patient, he stopped taking it several months ago because \"it didn't help\". States he was on it for ~ 1 year. He is not interested in trying a different medication or further assessment of his depression.     Unclear what inhalers patient uses - states he only uses when he has SOB. Was unable to indicate what he takes based on pictures. Likely takes Spiriva PRN. Uses albuterol nebulizer once per day and then every four hours as needed.     Uric acid 8.2%. Recently restarted allopurinol    BP Readings from Last 3 Encounters:   01/09/17 140/70   12/29/16 152/58   12/29/16 152/68   EF 55%. Took meds this AM. No headache, dizziness, or blurry vision. LDL 49 in 2013 on atorvastatin 10 mg/day.     Cannot remember if he takes his meds sometime due to memory loss. Son sets up in a machine that reminds him to take which has helped significantly. Son keeps track of refills. Takes medication once per day. Takes \"some with and some without\" food. Doesn't know which ones - his son reminds him. Does not miss often.     We reviewed Fraser's medication list with them, discussing reason for use, directions for use, and potential side effects of each medication.  Indication, safety, efficacy, and convenience was assessed for all medications. No DDIs noted    Current Outpatient Prescriptions   Medication Sig Dispense Refill     acetaminophen-codeine (TYLENOL #3) 300-30 mg per tablet Take 1-2 tablets by mouth every 6 (six) hours as needed for pain. 75 tablet 2     albuterol (PROVENTIL HFA;VENTOLIN HFA) 90 mcg/actuation inhaler Inhale 2 puffs every 4 (four) hours as needed for wheezing.       albuterol (PROVENTIL) 2.5 mg /3 mL (0.083 %) nebulizer solution NEBULIZE 1 VIAL (2.5 MG TOTAL) EVERY 4 HOURS AS NEEDED FOR WHEEZING OR SHORTNESS OF BREATH 225 mL 3     allopurinol (ZYLOPRIM) 100 MG tablet Take 1 tablet (100 mg total) by mouth daily. 90 tablet 0     aspirin 81 MG EC tablet Take 1 tablet (81 mg total) by mouth daily. 90 tablet 3 " "    atorvastatin (LIPITOR) 10 MG tablet Take 1 tablet (10 mg total) by mouth daily. 90 tablet 3     blood glucose test (GLUCOSE BLOOD) Strp Check blood glucose 2 (two) times daily 200 strip 2     brimonidine (ALPHAGAN) 0.2 % ophthalmic solution Administer 1 drop to the right eye 2 (two) times a day.       budesonide-formoterol (SYMBICORT) 80-4.5 mcg/actuation inhaler Inhale 2 puffs 2 (two) times a day. Rinse mouth after use       CHOLECALCIFEROL 1,000 unit tablet Take 1,000 Units by mouth daily.        diclofenac sodium (VOLTAREN) 1 % Gel Apply 1 application topically 2 (two) times a day as needed. Each application should be 2-4 grams. 1 Tube 3     dorzolamide-timolol (COSOPT) 22.3-6.8 mg/mL ophthalmic solution        escitalopram oxalate (LEXAPRO) 5 MG tablet TAKE 1 PILL BY MOUTH EVERYDAY/ TXHUA HNUB NOJ 1 LUB [facility] PAB JC NYUAB SIAB 90 tablet 0     furosemide (LASIX) 40 MG tablet Take 40 mg by mouth daily.       gabapentin (NEURONTIN) 100 MG capsule Take 100 mg by mouth bedtime. 30 capsule 3     GLUCERNA SHAKE Liqd DRINK 1 CAN BY MOUTH 3 TIMES A DAY FOR MALNUTRITION/ TXHUA HNUB HAUS 1 POOM 3 ZAUG 09002 mL 11     INJECT EASE LANCETS 30 gauge Misc USED AS DIRECTED. 100 each 3     insulin needles, disposable, (BD ULTRA-FINE MARVIN PEN NEEDLES) 32 x 5/32 \" Ndle Use As Directed. Use as directed.       levothyroxine (SYNTHROID, LEVOTHROID) 50 MCG tablet TAKE 1 PILL BY MOUTH EVERY DAY /TXHUA HNUB NOJ 1 [facility] TH[facility]ROID 30 tablet 11     lisinopril (PRINIVIL,ZESTRIL) 10 MG tablet TAKE 1 PILL BY MOUTH DAILY/ TXHUA HNUB NOJ 1 [facility]UB [facility] NTSHAV SIAB 90 tablet 1     metoprolol succinate (TOPROL-XL) 25 MG Take 1 tablet (25 mg total) by mouth daily. 90 tablet 3     miscellaneous medical supply (BLOOD PRESSURE CUFF) Misc Check blood pressure daily as directed. 1 each 0     nitroglycerin (NITROSTAT) 0.4 MG SL tablet Place 0.4 mg under the tongue every 5 (five) minutes as needed for chest pain.       " nut.tx.gluc.intol,lac-free,soy (GLUCERNA SHAKE) Liqd Take 1 Can by mouth daily.       omeprazole (PRILOSEC) 40 MG capsule Take 40 mg by mouth daily.       polyvinyl alcohol (LIQUITEARS) 1.4 % ophthalmic solution Administer 1 drop to both eyes 4 (four) times a day as needed.        prednisoLONE acetate (PRED-FORTE) 1 % ophthalmic suspension Administer 1 drop to the right eye 2 (two) times a day. Indications: Severe Ocular Inflammation       SPIRIVA WITH HANDIHALER 18 mcg inhalation capsule INHALE THE CONTENTS OF 1 CAPSULE DAILY/ TXHUA HNUB SIV LUB NQUS PA NQUS 1 LUB TSHUAJ 90 capsule 1     SYMBICORT 80-4.5 mcg/actuation inhaler INHALE 2 PUFFS BY MOUTH 2 (TWO) TIMES A DAY. RINSE MOUTH AFTER USE/ TXHUA HNUB NQUS 2 PA OB ZAUG. YAUG NCAUJ RIKI QAB SIV TAS 1 Inhaler 6     tamsulosin (FLOMAX) 0.4 mg Cp24        tenofovir (VIREAD) 300 mg tablet Take 300 mg by mouth every other day.       tiotropium (SPIRIVA) 18 mcg inhalation capsule Place 18 mcg into inhaler and inhale daily.        No current facility-administered medications for this visit.        Evelio was provided with a printed AVS, and this care plan was communicated via EMR with his primary care provider, Chandan Shea MD, and is the authorizing prescriber for this visit.  Direct supervision was available by either the patient's PCP or another available physician when needed.    Time spent: 60 minutes    Donal FloresD  MTM Pharmacist at [facility]

## 2021-06-08 NOTE — PROGRESS NOTES
Called with assistance of [employer] to discuss updates since visit yesterday. Per [facility], patient filled 90 day supply of metoprolol XL in December.     Patient's son will call back to let us know if they have additional metoprolol supply at home. Patient was advised to stop gabapentin and hold omeprazole to reduce polypharmacy. Advised to follow up in 1 month.

## 2021-06-09 NOTE — PROGRESS NOTES
Medication Therapy Management Initial Visit       ASSESSMENT AND PLAN    CHF/HTN: controlled.  -Continue lisinopril 10 mg daily  -Continue metoprolol XL 25 mg daily  -Continue ASA and statin    Pain: improved. APAP #3 is not ideal due to renal and hepatic dysfunction, however, he seems to be tolerating low dose without issue. Unclear indication for gabapentin, no s/s neuropathic pain, but it is reasonable to continue as patient perceives benefit. There is some former documentation of neuropathic pain-type symptoms  -Continue current therapy    Gout: uncontrolled, but now is adherent  -Recheck uric acid at follow up (patient declines this today)    Vitamin D Deficiency: due for level  -Vitamin D level at follow up (patient declines blood draw today)    COPD: controlled, however, patient is excessively using Symbicort and Spiriva  -Instructed about appropriate use - Qvar 2 puffs BID and Spiriva once daily  -Given spacer    HLD: Has tolerated statin without issue, and he may benefit in setting of CAD, however, indication is less clear since patient > 75. He will discuss need for continued use with PCP    Urinary Retention: no s/s of urinary retention after discontinuation of tamsulosin. Reasonable to continue to hold. Will attempt to verify by obtaining outside records    FOLLOW-UP PLAN  Evelio was advised to follow up in 1 month. Needs PCP visit as he is overdue.         SUBJECTIVE AND OBJECTIVE  Evelio Odell is a 83 y.o. male who was referred by Chandan Shea MD for MTM services.  Evelio's chief concern today is follow up med management and medication reconciliation.   is accompanied by a [place] . His son did not come today. He did bring in his medications but he is a poor historian. No specific concerns today.     Evelio has stopped self-adjusting his BP medication. Currently is taking metoprolol XL 25 mg daily and lisinopril 10 mg daily.   BP Readings from Last 3 Encounters:  "  03/24/17 140/70   01/30/17 125/60   01/09/17 140/70     He states he saw [facility] several months ago and they advised him to stop taking tamsulosin. He has been off of this for 2-3 months. He has had no problems with urination, bladder emptying, or frequency of urination since stopping. There is no record of this visit in the [facility] media tab.     Patient follows with [facility] for hepatitis C and is appropriately taking tenofovir 300 mg every third day.     Lower back is \"much better, not a concern\", but he still experiences significant upper shoulder and neck pain. Turning head \"seems like something is blocking from turning\". No shooting pain. No numbness or tingling - he has never had this. Still takes gabapentin despite instruction to discontinue at previous visit, but he believes it is helpful for his lower back pain and he wants to keep taking it.  Takes 2 tylenol #3 in the evening as pain is worse then. It is helpful - without the tylenol #3, he cannot sleep due to pain. He did  Voltaren gel which he uses as needed. He finds it helpful for pain.     Patient is using both QVar and Spiriva 2 puffs every 4 hours. And albuterol neb if needed approximately very infrequently Spiriva inhaler technique is appropriate. HFA inhaler technique is poor. Brething is better if the weather is good. He has no concerns related to his breathing today.     He has diphenhydramine that he takes as needed for itching up to two times per day. This is helpful for his itching. Itching is related to dry skin and liver dysfunction. No side effects from medication. No dizziness or falls. He does use lotion for dry skin with moderate effect    Patient brought in all medications today except the following: aspirin, atorvastatin, and lisinopril. He states that they should be present.Cannot remember if he takes his meds sometime due to memory loss. Son sets up in a machine that reminds him to take which has helped significantly. Son " keeps track of refills.   We reviewed [facility]'s medication list with them, discussing reason for use, directions for use, and potential side effects of each medication.  Indication, safety, efficacy, and convenience was assessed for all medications. No DDIs noted    Current Outpatient Prescriptions   Medication Sig Dispense Refill     acetaminophen-codeine (TYLENOL #3) 300-30 mg per tablet Take 1-2 tablets by mouth every 6 (six) hours as needed for pain. 75 tablet 2     albuterol (PROVENTIL HFA;VENTOLIN HFA) 90 mcg/actuation inhaler Inhale 2 puffs every 4 (four) hours as needed for wheezing.       albuterol (PROVENTIL) 2.5 mg /3 mL (0.083 %) nebulizer solution NEBULIZE 1 VIAL (2.5 MG TOTAL) EVERY 4 HOURS AS NEEDED FOR WHEEZING OR SHORTNESS OF BREATH 225 mL 3     allopurinol (ZYLOPRIM) 100 MG tablet Take 1 tablet (100 mg total) by mouth daily. 90 tablet 0     aspirin 81 MG EC tablet Take 1 tablet (81 mg total) by mouth daily. 90 tablet 3     atorvastatin (LIPITOR) 10 MG tablet Take 1 tablet (10 mg total) by mouth daily. 90 tablet 3     blood glucose test (GLUCOSE BLOOD) Strp Check blood glucose 2 (two) times daily 200 strip 2     brimonidine (ALPHAGAN) 0.2 % ophthalmic solution Administer 1 drop to the right eye 2 (two) times a day.       budesonide-formoterol (SYMBICORT) 80-4.5 mcg/actuation inhaler Inhale 2 puffs 2 (two) times a day. Rinse mouth after use       CHOLECALCIFEROL 1,000 unit tablet Take 1,000 Units by mouth daily.        diclofenac sodium (VOLTAREN) 1 % Gel Apply 1 application topically 2 (two) times a day as needed. Each application should be 2-4 grams. 1 Tube 3     dorzolamide-timolol (COSOPT) 22.3-6.8 mg/mL ophthalmic solution        furosemide (LASIX) 40 MG tablet Take 40 mg by mouth daily.       gabapentin (NEURONTIN) 100 MG capsule Take 100 mg by mouth bedtime. 30 capsule 3     GLUCERNA SHAKE Liqd DRINK 1 CAN BY MOUTH 3 TIMES A DAY FOR MALNUTRITION/ TXHUA HNUB HAUS 1 POOM 3 ZAUG 59322 mL 11      "INJECT EASE LANCETS 30 gauge Misc USED AS DIRECTED. 100 each 3     insulin needles, disposable, (BD ULTRA-FINE MARVIN PEN NEEDLES) 32 x 5/32 \" Ndle Use As Directed. Use as directed.       levothyroxine (SYNTHROID, LEVOTHROID) 50 MCG tablet TAKE 1 PILL BY MOUTH EVERY DAY /TXHUA HNUB NOJ 1 LUB TSHUAJ PAB JC THYROID 30 tablet 11     lisinopril (PRINIVIL,ZESTRIL) 10 MG tablet TAKE 1 PILL BY MOUTH DAILY/ TXHUA HNUB NOJ 1 LUB TSHUAJ PAB JC NTSHAV SIAB 90 tablet 10     metoprolol succinate (TOPROL-XL) 25 MG Take 1 tablet (25 mg total) by mouth daily. 90 tablet 3     miscellaneous medical supply (BLOOD PRESSURE CUFF) Misc Check blood pressure daily as directed. 1 each 0     nitroglycerin (NITROSTAT) 0.4 MG SL tablet Place 0.4 mg under the tongue every 5 (five) minutes as needed for chest pain.       nut.tx.gluc.intol,lac-free,soy (GLUCERNA SHAKE) Liqd Take 1 Can by mouth daily.       omeprazole (PRILOSEC) 40 MG capsule Take 40 mg by mouth daily.       polyvinyl alcohol (LIQUITEARS) 1.4 % ophthalmic solution Administer 1 drop to both eyes 4 (four) times a day as needed.        SPIRIVA WITH HANDIHALER 18 mcg inhalation capsule INHALE THE CONTENTS OF 1 CAPSULE DAILY/ TXHUA HNUB SIV LUB NQUS PA NQUS 1 LUB TSHUAJ 90 capsule 1     SYMBICORT 80-4.5 mcg/actuation inhaler INHALE 2 PUFFS BY MOUTH 2 (TWO) TIMES A DAY. RINSE MOUTH AFTER USE/ TXHUA HNUB NQUS 2 PA OB ZAUG. YAUG NCAUJ RIKI QAB SIV TAS 1 Inhaler 6     tenofovir (VIREAD) 300 mg tablet Take 300 mg by mouth every third day.       No current facility-administered medications for this visit.        Evelio was provided with a printed AVS, and this care plan was communicated via EMR with his primary care provider, Chandan Shea MD, and is the authorizing prescriber for this visit.  Direct supervision was available by either the patient's PCP or another available physician when needed.    Time spent: 60 minutes    Donal FloresD  [place] Pharmacist at [facility]      "

## 2021-06-09 NOTE — PROGRESS NOTES
Patient outreach follow up:  02/24/17: called pt and no answer.   02/28/17: called again and no answer. Left a voice message for pt to call back with phone number is provided.  03/10/17: called pt again, still no answer. Left another voice message for pt to call back with phone number is provided.     I have called [facility] 3 times over the past two weeks and have been unsuccessfull in reaching this patient for 1 month now.  This patient has not returned any of my messages.  I will continue attempting to reach out to this patient in one month.  I will also check this patient's chart for upcoming appointments, ER reports that may contain a new phone number, or any other recent activity.  I have informed the primary care provider by tasking regarding the lack of successful follow up.    Future appointment:  As of 3/10/17, pt have no f/u appt within [facility].     Plan:  Per [facility] policy on unsuccessful outreach; second monthly outreach f/u with one call.

## 2021-06-10 NOTE — PROGRESS NOTES
Assessment/ Plan  1. Spontaneous pneumothorax  Right side and large in patient with COPD and recent extensive right-sided community-acquired pneumonia.  Remarkably stable respiratory status, O2 sat 94% and patient reasonably comfortable at rest  This halted further evaluation and patient was sent to the emergency room with his son.  Emergency room doctor notified personally.  2. Chronic obstructive pulmonary disease    Symbicort and albuterol albuterol, Spiriva and Symbicort.  Not fully recovered after recent hospitalization for community-acquired pneumonia.  Remarkably good oxygenation    3. SOB (shortness of breath)  Likely due to combination of 2 problems above  - HM2(CBC w/o Differential)  - Comprehensive Metabolic Panel  - XR Chest PA and Lateral; Future  - Procalcitonin    4. CAP (community acquired pneumonia)  Severely abnormal chest x-ray from hospitalization in April reviewed.    5. Chest pressure  Probably related to pneumothorax.  Underlying coronary artery disease with unchanged EKG, troponin pending.  Strict congestive heart failure/systolic but appears to be stable, weight down, no elevated JVP or lower extremity edema noted today  - Electrocardiogram Perform - Clinic  - HM2(CBC w/o Differential)  - Comprehensive Metabolic Panel  - XR Chest PA and Lateral; Future  - Troponin I    6. Atherosclerosis of native coronary artery of native heart without angina pectoris  As above, await troponin, no change with EKG    7. Chronic Systolic Congestive Heart Failure  Well compensated, it appears    8. Headache  Unclear etiology.  Bitemporal, await sed rate.  - Erythrocyte Sedimentation Rate  - HM2(CBC w/o Differential)  - Comprehensive Metabolic Panel    9. Low back pain  Possibly related to pneumothorax.  Most likely musculoskeletal    10. Foot pain, bilateral  Unclear cause.  Patient has underlying gout but this does not appear to be gout.  It is symmetric, on the dorsum of the feet bilaterally.  He has good  posterior tibial pulses on both sides and good capillary refill.    11. Gout  Tophaceous, on allopurinol.  - Uric Acid    Body mass index is 23.2 kg/(m^2).    Subjective  CC:  Chief Complaint   Patient presents with     Chest Pain     SOB, tight chest     Foot Pain     gout, request refill     Headache     throat pain     meds request     HPI:  bilat Foot pain  Narrative: history of gout  Notes:  Duration/ timing of onset: since last hospital  4/15   Location of pain both feet     Severity/ Quality: throbbing  Modifying factors: Make it worse- standing- even when sheets drag across   Make it better- T#3  History of foot problems/injury or previous work-up/ treatment? Yes- gor gout  Swelling? some  Comments she thinks this is probably gout.  However, has not had any marked swelling of gout and usually gout attacks either his ankles or his toes.  Been using allopurinol    Shortness of breath  Narrative: Ongoing shortness of breath in a patient with COPD  --------------------  With exertion/ or at rest?  Noticed both, mostly at rest  Duration?  Never completely better since leaving the hospital  Onset?  Hospitalization  Severity?  Moderate  Associated Cough? yes  Associated Chest Pain, diaphoresis, dizziness? Chest pressure  Associated leg swelling? no  PND or Orthopnea? no  History of smoking/ or other smoke exposure? yes        COPD  Currently at baseline? Not since hospital; chest pressure, sob.   Symptoms description:  Cough, sob, chest pressure  Smoking?  Not currently  Inhaler:  PRN: albuterol  Oxygen use? no  Scheduled: symbicort 80 bid, spiriva        Chest Pain  Narrative  ---------------  When / onset/  Duration? Last 2-3 weeks; 10-15 minutes spells.  Not necessarily related to exertion.  Quality :?feels like things getting stuck in chest   Severity -moderate  Location? Retrosternal-   Trigger? random Occurred at rest or with exertion?  rest  Resolution, anything seem to make it better? Tried NTG, has  not  Pleuritic or with movement? Seems to come one  Associated with eating/ swallowing? no  A/w SOB?  Yes somewhat worse shortness of breath with spells  Diaphoresis?  Denies  Palpitations or dizziness?  Benign  Personal or family history of heart disease? Yes- not sure if pressure is similar      CHF  Systolic/Diastolic  Narrative systolic chf- concerned about fluid on chest.  Meds:furos 40, lisinopril 10, metop xl 25    _________  Edema nothing new  PND/ Orthopnea?  No  Wt Readings from Last 3 Encounters:   05/24/17 111 lb (50.3 kg)   04/24/17 118 lb (53.5 kg)   04/20/17 118 lb 6.4 oz (53.7 kg)       Monitoring weight?  No    Results for orders placed or performed during the hospital encounter of 04/15/17   Basic Metabolic Panel   Result Value Ref Range    Sodium 138 136 - 145 mmol/L    Potassium 4.4 3.5 - 5.0 mmol/L    Chloride 115 (H) 98 - 107 mmol/L    CO2 21 (L) 22 - 31 mmol/L    Anion Gap, Calculation 2 (L) 5 - 18 mmol/L    Glucose 97 70 - 125 mg/dL    Calcium 7.9 (L) 8.5 - 10.5 mg/dL    BUN 51 (H) 8 - 28 mg/dL    Creatinine 1.72 (H) 0.70 - 1.30 mg/dL    GFR MDRD Af Amer 46 (L) >60 mL/min/1.73m2    GFR MDRD Non Af Amer 38 (L) >60 mL/min/1.73m2     Lab Results   Component Value Date     (H) 04/15/2017         Back pain  Narrative both upper and lower back  ---------------------  Duration/timing- 1 month  Location/ radiation- low back, both sides/ middle  Quality/Severity-5/10, pressure and pulling  Context/modifying factors-moving makes worse, massage  Red flags- progressive weakness, weight loss/ fever, night-time awakening?-no    Headache  Duration- 2-4 weeks  Location- bitemporal  Severity/quality-throbbing  Associated symptoms-no  Timing/context- daily comes on in evening  Things that make pain better? no  Nausea/vomiting? no  Photophobia?yes  Patient has had symptoms before- came and went  Red flag symptoms; overnight awakening, worsening with valsalva or orthostatic change, change in behavior or  mood, gradually worsening pattern?  No    PFSH:  Current medications reviewed as follows:  No current facility-administered medications on file prior to visit.      Current Outpatient Prescriptions on File Prior to Visit   Medication Sig     acetaminophen-codeine (TYLENOL #3) 300-30 mg per tablet TAKE 1-2 PILLS BY MOUTH EVERY 6 HOURS AS NEEDED FOR PAIN/ YOG MOB TXHUA 6 TEEV NOJ 1-2 LUB     albuterol (PROVENTIL HFA;VENTOLIN HFA) 90 mcg/actuation inhaler Inhale 2 puffs every 4 (four) hours as needed for wheezing.     albuterol (PROVENTIL) 2.5 mg /3 mL (0.083 %) nebulizer solution Take 2.5 mg by nebulization every 6 (six) hours as needed for wheezing.     allopurinol (ZYLOPRIM) 100 MG tablet Take 100 mg by mouth daily. TAKE 1 PILL BY MOUTH EVERY DAY/ TXHUA HNUB NOJ 1 LUB PAB JC MOB KO TAW VWM     allopurinol (ZYLOPRIM) 100 MG tablet TAKE 1 PILL BY MOUTH EVERY DAY/ TXHUA HNUB NOJ 1 LUB PAB JC MOB KO TAW VWM     aspirin 81 MG EC tablet Take 1 tablet (81 mg total) by mouth daily.     atorvastatin (LIPITOR) 10 MG tablet Take 1 tablet (10 mg total) by mouth daily.     blood glucose test (GLUCOSE BLOOD) Strp Check blood glucose 2 (two) times daily     budesonide-formoterol (SYMBICORT) 80-4.5 mcg/actuation inhaler Inhale 2 puffs 2 (two) times a day.     CHOLECALCIFEROL 1,000 unit tablet Take 1,000 Units by mouth daily.      diclofenac sodium (VOLTAREN) 1 % Gel Apply 1 application topically 2 (two) times a day as needed. Each application should be 2-4 grams.     dorzolamide-timolol (COSOPT) 22.3-6.8 mg/mL ophthalmic solution Administer 1 drop to the right eye 2 (two) times a day.      furosemide (LASIX) 40 MG tablet Take 40 mg by mouth daily.     gabapentin (NEURONTIN) 100 MG capsule Take 100 mg by mouth at bedtime. TAKE 1 PILL BY MOUTH EVERY DAY AT BEDTIME/TXHUA HMO THAUM MUS PW NOJ 1 LUB PAB QHOV MOB     GLUCERNA SHAKE Liqd DRINK 1 CAN BY MOUTH 3 TIMES A DAY FOR MALNUTRITION/ TXHUA HNUB HAUS 1 POOM 3 ZAUG     hydrOXYzine  "(ATARAX) 25 MG tablet Take 1 tablet (25 mg total) by mouth daily as needed for itching.     INJECT EASE LANCETS 30 gauge Misc USED AS DIRECTED.     insulin needles, disposable, (BD ULTRA-FINE MARVIN PEN NEEDLES) 32 x 5/32 \" Ndle Use As Directed. Use as directed.     levothyroxine (SYNTHROID, LEVOTHROID) 50 MCG tablet Take 50 mcg by mouth Daily at 6:00 am. TAKE 1 PILL BY MOUTH EVERY DAY /TXHUA HNUB NOJ 1 LUB TSHUA PAB JC THYROID     lisinopril (PRINIVIL,ZESTRIL) 10 MG tablet Take 10 mg by mouth daily. TAKE 1 PILL BY MOUTH DAILY/ TXHUA HNUB NOJ 1 LUB TSHUA PAB JC NTSHAV SIAB     metoprolol succinate (TOPROL-XL) 25 MG Take 1 tablet (25 mg total) by mouth daily.     miscellaneous medical supply (BLOOD PRESSURE CUFF) Misc Check blood pressure daily as directed.     nitroglycerin (NITROSTAT) 0.4 MG SL tablet Place 0.4 mg under the tongue every 5 (five) minutes as needed for chest pain.     nut.tx.gluc.intol,lac-free,soy (GLUCERNA SHAKE) Liqd Take 1 Can by mouth daily.     polyvinyl alcohol (LIQUITEARS) 1.4 % ophthalmic solution Administer 1 drop to both eyes 4 (four) times a day as needed.      prednisoLONE acetate (PRED-FORTE) 1 % ophthalmic suspension Administer 1 drop to the right eye 3 (three) times a day.     SPIRIVA WITH HANDIHALER 18 mcg inhalation capsule INHALE THE CONTENTS OF 1 CAPSULE DAILY/ TXHUA HNUB SIV LUB NQUS PA NQUS 1 LUB TSHUAJ     tamsulosin (FLOMAX) 0.4 mg Cp24 Take 1 capsule (0.4 mg total) by mouth daily after supper.     tenofovir alafenamide (VEMLIDY) 25 mg Tab tablet Take 25 mg by mouth daily with breakfast. Take with food     tiotropium (SPIRIVA) 18 mcg inhalation capsule Place 18 mcg into inhaler and inhale daily. 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     Priority: High     Cerebral vascular accident 04/16/2015     Priority: High     Gout " 04/08/2015     Priority: High     Coronary atherosclerosis of unspecified type of vessel, native or graft 01/23/2015     Priority: High     Overview Note:     Replacement Utility updated for latest IMO load       Adult Sleep Apnea      Priority: High     Overview Note:     Created by Conversion    Replacement Utility updated for latest IMO load       Obstructive Sleep Apnea      Priority: High     Overview Note:     Created by Conversion         Chronic obstructive pulmonary disease with acute exacerbation      Priority: High     Overview Note:     Never a smoker.  6/2013 0.87, 61% of predicted.  Seen Dr. Mitchell in the past.  CT scan 8/20/13 shows volume loss right upper lobe, chronic atelectasis, obliterating block bronchiolitis or constrictive bronchiolitis right lower lobe.  Cylindrical bronchiectasis right lower lobe.  Mediastinal adenopathy with calcifications.    Treated as with COPD.  Seeing [facility] every 6 months.         Chronic Kidney Disease, Stage 3      Priority: High     Overview Note:     9/12/16 Follows [facility] Dr. [facility]  Last seen 8/19.  Creatinine at that time was 1.52.  Nine-month follow-up          Hypothyroidism      Priority: High     Overview Note:     Created by Conversion    Replacement Utility updated for latest IMO load       Chronic Systolic Congestive Heart Failure      Priority: High     Overview Note:     Created by Conversion         Urinary retention 05/10/2016     Priority: Medium     Overview Note:     Distended bladder on ER visit for decreased LOC 2/10/16.  Catheter placed and DCed.  Follow-up Urology, Dr Ames. Placed on Tamsulosin, cath removed and PVR = 0       Acute cholecystitis 08/23/2015     Priority: Medium     Depression 05/04/2015     Priority: Medium     Cervicalgia      Priority: Medium     Overview Note:     Followed by spine.  Spondylolisthesis with facet arthropathy.    12/2016 annotation: seen Surgeon- Dawson.  Plan medial branch block  injections + PT prior to considering surgery         Cholelithiasis With Bile Duct Calculi & Acute Cholecystitis      Priority: Medium     Overview Note:     Created by Conversion    Replacement Utility updated for latest IMO load       Thrombophlebitis Of Brachial Vein      Priority: Medium     Overview Note:     Created by Conversion         Esophageal reflux      Priority: Medium     Overview Note:     Created by Conversion         DM type 2 (diabetes mellitus, type 2)      Priority: Medium     Overview Note:     Created by Conversion         Anemia      Priority: Medium     Overview Note:     Created by Conversion         Advanced directives, counseling/discussion 04/24/2017     CAP (community acquired pneumonia) 04/16/2017     Leukocytosis, unspecified type 04/16/2017     Mild intermittent asthma with acute exacerbation      Pneumonia 04/15/2017     TWYLA (acute kidney injury)      Hypotension      Controlled substance agreement signed 12/29/2016     Overview Note:     Tylenol No. 3, 75 per month.  One-month post +2 refills, 12/29/16  Indication is chronic cervicalgia       History   Smoking Status     Never Smoker   Smokeless Tobacco     Never Used     Social History     Social History Narrative     Patient Care Team:  Chandan Shea MD as PCP - [facility]   Endy Mulligan MD as Physician (Cardiology)  Maury Uriarte CNP (Inactive) as Nurse Practitioner (Gastroenterology)  Jane Pan CNP as Nurse Practitioner (Pulmonary Disease)  CHRIS Stevens Mai, CMA as Clinic Care Coordination Care Guide (Clinic Care Coordination)  ROS  Negative constitutional, except as noted above negative cardiac, respiratory, GI, urologic, dermatologic, rheumatologic, psych, neuro    Objective  Physical Exam  Vitals:    05/24/17 1315   BP: 108/60   Patient Site: Left Arm   Patient Position: Sitting   Cuff Size: Adult Regular   Pulse: 66   Resp: 20   Temp: 98.7  F (37.1  C)  "  [facility]   SpO2: 94%   Weight: 111 lb (50.3 kg)   Height: 4' 10\" (1.473 m)     Patient is alert, oriented, appears stated age.  No acute distress peer slightly dyspneic  Chest exam reveals somewhat asymmetric breath sounds, especially apparent after chest x-ray revealed pneumothorax.  He has bilateral expiratory wheezes, no significant rales.  No subcutaneous crepitus is found.  Cardiac exam reveals regular rate and rhythm normal tonsillar murmurs, gallops, rubs.  Remedies without significant edema.  No elevated JVP noted.  Cranial nerves II through XII intact.  Optic fundi normal.  No papilledema.  Strength tested in upper extremities and is normal.  Coordination intact.  Gait normal.  Romberg negative.  Triceps, biceps, patellar, ankle reflexes tested and are normal.  Mild tenderness over temporal arteries, nothing severe  Diagnostics  Results for orders placed or performed in visit on 05/24/17   Erythrocyte Sedimentation Rate   Result Value Ref Range    Sed Rate 44 (H) 0 - 15 mm/hr   HM2(CBC w/o Differential)   Result Value Ref Range    WBC 13.4 (H) 4.0 - 11.0 thou/uL    RBC 4.66 4.40 - 6.20 mill/uL    Hemoglobin 13.7 (L) 14.0 - 18.0 g/dL    Hematocrit 41.8 40.0 - 54.0 %    MCV 90 80 - 100 fL    MCH 29.3 27.0 - 34.0 pg    MCHC 32.7 32.0 - 36.0 g/dL    RDW 13.2 11.0 - 14.5 %    Platelets 184 140 - 440 thou/uL    MPV 7.9 7.0 - 10.0 fL   Electrocardiogram Perform - Clinic   Result Value Ref Range    SYSTOLIC BLOOD PRESSURE  mmHg    DIASTOLIC BLOOD PRESSURE  mmHg    VENTRICULAR RATE 68 BPM    ATRIAL RATE 68 BPM    P-R INTERVAL 150 ms    QRS DURATION 98 ms    Q-T INTERVAL 416 ms    QTC CALCULATION (BEZET) 442 ms    P Axis 73 degrees    R AXIS 88 degrees    T AXIS 83 degrees    MUSE DIAGNOSIS       Normal sinus rhythm with sinus arrhythmia  Normal ECG  When compared with ECG of 15-APR-2017 18:08,  No significant change was found       Personally reviewed and agree  Personally reviewed chest x-ray which showed " right-sided pneumothorax.  I called on-call radiologist, Dr. Helms who agreed    Please note: Voice recognition software was used in this dictation.  It may therefore contain typographical errors.

## 2021-06-10 NOTE — PROGRESS NOTES
Over 25 minutes spent greater than 50% of this counseling regarding following issues:  1. CAP (community acquired pneumonia)  Patient here for follow-up of pneumonia superimposed on COPD.  Probably some chronic component of the pneumonia based on findings and CT  Recommend finishing antibiotics, follow-up 1 month for repeat x-ray to confirm improvement/resolution  2. Chronic obstructive pulmonary disease with acute exacerbation  Finishing Medrol Dosepak.  Has albuterol nebs, Symbicort and Spiriva his chronic COPD meds.  Has not seen an outpatient pulmonologist, at least for some time.  Doing pretty well at baseline.  Does not need oxygen.    3. Chronic Kidney Disease, Stage 3  Referral back to nephrology for routine follow-up.  Kidney function back to baseline  - Ambulatory referral to Nephrology    4. Atherosclerosis of native coronary artery of native heart without angina pectoris  Blood pressure controlled, beta-blocker.  On aspirin and statin medication.  No changes    5. Advanced directives, counseling/discussion  Most of the visit spent discussing this.  Encourage consideration of advanced directives and extensive discussion with family.  Discussed explicitly issues around COPD and intubation.  And further conversation.  Patient Instructions   For pneumonia- should have 2 inhaled meds to take everyday- spiriva and symbicort - + nebs as need.  See us back in one month to check chest xray    For liver- make sure you keep taking your tenafovir    Diabetes seems okay still    You should see the kidney doctors.    I would continue to have conversations with your family about what to do if you become very sick and cannot make your own medical decisions; whether to opt for comfort care (maybe at home) or aggressive care- probably in the hospital.              --------------------------------  Notes  -----------------------------------------------------  Vision reports that he is doing significantly better than he was  upon admission.  Here with his daughter-in-law today.  Has no new questions.  Hospital Follow-up  Date of admission: 4/15  Date of discharge:4/22  Hospital: [facility]  Chief Diagnosis: copd exacerbation + bladder outlet obstruction  Narrative: admitted sob and balloting-     Procedures During hospital stay not mentioned: None  Radiology studies:  CT Chest Without Contrast   Final Result   CONCLUSION:   1. Right lower lobe pneumonia. The associated bronchiectasis and bronchial wall thickening suggests chronic aspiration pneumonia. Clinical correlation is recommended.       2. Chronic atelectasis in the right lung apex is unchanged compared to the prior exam.       3. Densely calcified atherosclerotic plaque in the visualized arteries.       NOTE: ABNORMAL REPORT       THE DICTATION ABOVE DESCRIBES AN ABNORMALITY FOR WHICH FOLLOW-UP IS NEEDED.            Labs: Patient's discharge creatinine was 1.72 with a BUN of 51.  Admission creatinine was above 3.  White blood cell count went from 18.1-16.6 but patient was started on steroids.  Lactic acid was somewhat elevated on admission.   Evelio Odell   Home Medication Instructions BRARY:    Printed on:04/24/17 6050   Medication Information                      acetaminophen-codeine (TYLENOL #3) 300-30 mg per tablet  Take 1-2 tablets by mouth every 6 (six) hours as needed for pain.             albuterol (PROVENTIL HFA;VENTOLIN HFA) 90 mcg/actuation inhaler  Inhale 2 puffs every 4 (four) hours as needed for wheezing.             albuterol (PROVENTIL) 2.5 mg /3 mL (0.083 %) nebulizer solution  Take 2.5 mg by nebulization every 6 (six) hours as needed for wheezing.             allopurinol (ZYLOPRIM) 100 MG tablet  Take 100 mg by mouth daily. TAKE 1 PILL BY MOUTH EVERY DAY/ COLTON GARDUNO NOJ 1 LUB PAB JC MOB KO TAW VWM             aspirin 81 MG EC tablet  Take 1 tablet (81 mg total) by mouth daily.             atorvastatin (LIPITOR) 10 MG tablet  Take 1 tablet (10 mg total) by mouth  "daily.             blood glucose test (GLUCOSE BLOOD) Strp  Check blood glucose 2 (two) times daily             budesonide-formoterol (SYMBICORT) 80-4.5 mcg/actuation inhaler  Inhale 2 puffs 2 (two) times a day.             CHOLECALCIFEROL 1,000 unit tablet  Take 1,000 Units by mouth daily.              diclofenac sodium (VOLTAREN) 1 % Gel  Apply 1 application topically 2 (two) times a day as needed. Each application should be 2-4 grams.             dorzolamide-timolol (COSOPT) 22.3-6.8 mg/mL ophthalmic solution  Administer 1 drop to the right eye 2 (two) times a day.              furosemide (LASIX) 40 MG tablet  Take 40 mg by mouth daily.             gabapentin (NEURONTIN) 100 MG capsule  Take 100 mg by mouth at bedtime. TAKE 1 PILL BY MOUTH EVERY DAY AT BEDTIME/TXMANISHAA HMO THAUM MUS PW NOJ 1 LUB PAB QHOV MOB             GLUCERNA SHAKE Liqd  DRINK 1 CAN BY MOUTH 3 TIMES A DAY FOR MALNUTRITION/ TXHUA HNUB HAUS 1 POOM 3 ZAUG             hydrOXYzine (ATARAX) 25 MG tablet  Take 1 tablet (25 mg total) by mouth daily as needed for itching.             INJECT EASE LANCETS 30 gauge Misc  USED AS DIRECTED.             insulin needles, disposable, (BD ULTRA-FINE MARVIN PEN NEEDLES) 32 x 5/32 \" Ndle  Use As Directed. Use as directed.             levoFLOXacin (LEVAQUIN) 500 MG tablet  Take 1 tablet (500 mg total) by mouth every other day for 10 days.             levothyroxine (SYNTHROID, LEVOTHROID) 50 MCG tablet  Take 50 mcg by mouth Daily at 6:00 am. TAKE 1 PILL BY MOUTH EVERY DAY /TXHUA HNUB NOJ 1 LUB TSHUAJ PAB JC THYROID             lisinopril (PRINIVIL,ZESTRIL) 10 MG tablet  Take 10 mg by mouth daily. TAKE 1 PILL BY MOUTH DAILY/ TXHUA HNUB NOJ 1 LUB TSHUAJ PAB JC NTSHAV SIAB             metoprolol succinate (TOPROL-XL) 25 MG  Take 1 tablet (25 mg total) by mouth daily.             miscellaneous medical supply (BLOOD PRESSURE CUFF) Misc  Check blood pressure daily as directed.             nitroglycerin (NITROSTAT) 0.4 MG SL " tablet  Place 0.4 mg under the tongue every 5 (five) minutes as needed for chest pain.             nut.tx.gluc.intol,lac-free,soy (GLUCERNA SHAKE) Liqd  Take 1 Can by mouth daily.             polyvinyl alcohol (LIQUITEARS) 1.4 % ophthalmic solution  Administer 1 drop to both eyes 4 (four) times a day as needed.              prednisoLONE acetate (PRED-FORTE) 1 % ophthalmic suspension  Administer 1 drop to the right eye 3 (three) times a day.             tamsulosin (FLOMAX) 0.4 mg Cp24  Take 1 capsule (0.4 mg total) by mouth daily after supper.             tenofovir alafenamide (VEMLIDY) 25 mg Tab tablet  Take 25 mg by mouth daily with breakfast. Take with food             tiotropium (SPIRIVA) 18 mcg inhalation capsule  Place 18 mcg into inhaler and inhale daily. INHALE THE CONTENTS OF 1 CAPSULE DAILY/ TXHUA HNUB SIV LUB NQUS PA NQUS 1 LUB TSHUAJ               Follow-up recommended by discharging doctor: None.  Reviewed last renal note 8/19 which patient was seen by Dr. Miller.  Recommended 9 month follow-up.

## 2021-06-10 NOTE — PROGRESS NOTES
Medication Therapy Management Follow Up Visit       ASSESSMENT AND PLAN    Medication Management: Difficult to determine what the patient is actually taking as multiple medications are missing today (lisinopril, cholecalciferol, levofloxacin, levothyroxine). Medications are missing at each visit, however, the missing medications in question usually change. Based on fill history, I presume he is taking the medications, that the remainder was filled in the pillbox, and they did not bring in the empty bottles. The son was given a list of the missing medications and encouraged to contact the clinic if they are not present when they get home. Patient was encouraged to bring both medication AND pillbox to future visits    S/P hospital discharge for pneumonia: improving  -Encouraged to finish course of levofloxacin.     COPD: difficult to assess due to recent hospital discharge for pneumonia. Inhaler technique for both is appropriate. He continues to use his spacer with Symbicort.   -Encouraged to continue Symbicort 2 puffs BID  -Encouraged to only use Spiriva once per day rather than BID  -Reassess CAT score at follow up. He may benefit from additional therapy    CAD/HTN/CHF: controlled. On appropriate therapy, although, adherence to lisinopril is not certain. Based on BP and son's report, it is likely that he is taking lisinopril.  -Patients son will check to see if lisinopril is at home. If it is NOT present, he will call the clinic for instructions to help restart. I would recommend repeat BP check prior to restarting. Ideally he would be on both BB and ACEI due to hx of systolic HF and CAD    HLD: Has tolerated statin without issue, and he may benefit in setting of CAD, however, indication is less clear since patient > 75. 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 Difficulty swallowing likely is not due to the medication itself.   -Take medication with a spoonful of pudding or applesauce. If difficult swallowing doesn't improve, or worsens, patient was encouraged to have further workup from a physician.   -Continue Vemlidy   -Continue hydroxyzine PRN itching    FOLLOW-UP PLAN  Evelio was advised to follow up in Dr. Shea in 1 month. PharmD in 2 months.Bring in pillbox to follow up      SUBJECTIVE AND OBJECTIVE  Evelio Odell is a 83 y.o. male who was referred by Chandan Shea MD for MT services.  Evelio's chief concern today is follow up medication management.   is accompanied by a [employer] . His son did attend today. He did bring in his medications but he is a poor historian. Historically, he brings in different medications to each visit. He often is missing a medication at one visit, but brings it to the next visit, making medication reconciliation and determination of what he is actually taking quite difficult. He has an automatic pillbox with an alarm reminder that is set up every week either by his son or his wife. Last time was set up by his wife.     Since previous visit, patient has been discharged from the hospital secondary to pneumonia. He was evaluated by Dr. Shea last week. Patient was discharged with a 10 day course of levofloxacin to last through May 2nd. This was picked up on 4/22, but the bottle is not present today. Still experiencing some chest tightness if he inhales too deeply. He feels that this is improving somewhat. He is using Symbicort 80/4.5 mcg 2 puffs BID and Spiriva 1 inhalation daily. Occasionally he uses Spiriva BID. Using albuterol nebulizer two to four times per day regularly. Breathing does not affect his sleep. Occasional chest tightness. Cough worse with weather changes. Breathing occasionally affects his daily activities.     Patient has hx of CAD. He is currently taking atorvastatin 10 mg daily, and  "metoprolol succinate 25 mg daily. He believes he is also taking lisinopril 10 mg daily, although the bottle is not present here today. Lisinopril was last filled on 2/11/17 for a 90 day supply. Son states that some refills are at the pharmacy that they need to . They believe he has been taking appropriately. (of note, this was missing during previous encounter as well).   BP Readings from Last 3 Encounters:   04/26/17 100/58   04/24/17 166/60   04/22/17 157/72     Patient was evaluated by [facility] on 4/15/17. Unfortunately, the note was not completely scanned in to the media tab. It appears that he was started on tamsulosin. No concerns with this today. No concerns with his urination. No dizziness or lightheadedness after use.     Patient was recently changed to Vemlidy (tenofovir alafenamide) by his GI provider due to renal dysfunction. He has tolerated this well, however, he feels that since starting medications get \"stuck\" in his throat. No other concerns with this medication. Easier to remember since it is now daily rather than every third day.     He has stopped taking diphenhydramine and utilizes hydroxyzine PRN for itching. This bottle was not present in visit today. Finds that it helps to relieve his itching.     We reviewed Fraser's medication list with them, discussing reason for use, directions for use, and potential side effects of each medication.  Indication, safety, efficacy, and convenience was assessed for all reviewed medications.      Current Outpatient Prescriptions   Medication Sig Dispense Refill     acetaminophen-codeine (TYLENOL #3) 300-30 mg per tablet Take 1-2 tablets by mouth every 6 (six) hours as needed for pain. 75 tablet 0     albuterol (PROVENTIL HFA;VENTOLIN HFA) 90 mcg/actuation inhaler Inhale 2 puffs every 4 (four) hours as needed for wheezing.       albuterol (PROVENTIL) 2.5 mg /3 mL (0.083 %) nebulizer solution Take 2.5 mg by nebulization every 6 (six) hours as needed " "for wheezing.       allopurinol (ZYLOPRIM) 100 MG tablet Take 100 mg by mouth daily. TAKE 1 PILL BY MOUTH EVERY DAY/ TXHUA HNUB NOJ 1 LUB PAB JC MOB KO TAW VWM       aspirin 81 MG EC tablet Take 1 tablet (81 mg total) by mouth daily. 90 tablet 3     atorvastatin (LIPITOR) 10 MG tablet Take 1 tablet (10 mg total) by mouth daily. 90 tablet 3     blood glucose test (GLUCOSE BLOOD) Strp Check blood glucose 2 (two) times daily 200 strip 2     budesonide-formoterol (SYMBICORT) 80-4.5 mcg/actuation inhaler Inhale 2 puffs 2 (two) times a day.       CHOLECALCIFEROL 1,000 unit tablet Take 1,000 Units by mouth daily.        diclofenac sodium (VOLTAREN) 1 % Gel Apply 1 application topically 2 (two) times a day as needed. Each application should be 2-4 grams. 1 Tube 3     dorzolamide-timolol (COSOPT) 22.3-6.8 mg/mL ophthalmic solution Administer 1 drop to the right eye 2 (two) times a day.        furosemide (LASIX) 40 MG tablet Take 40 mg by mouth daily.       gabapentin (NEURONTIN) 100 MG capsule Take 100 mg by mouth at bedtime. TAKE 1 PILL BY MOUTH EVERY DAY AT BEDTIME/TXHUA HMO THAUM MUS PW NOJ 1 LUB PAB QHOV MOB       GLUCERNA SHAKE Liqd DRINK 1 CAN BY MOUTH 3 TIMES A DAY FOR MALNUTRITION/ TXHUA HNUB HAUS 1 POOM 3 ZAUG 92462 mL 11     hydrOXYzine (ATARAX) 25 MG tablet Take 1 tablet (25 mg total) by mouth daily as needed for itching. 30 tablet 0     INJECT EASE LANCETS 30 gauge Misc USED AS DIRECTED. 100 each 3     insulin needles, disposable, (BD ULTRA-FINE MARVIN PEN NEEDLES) 32 x 5/32 \" Ndle Use As Directed. Use as directed.       levoFLOXacin (LEVAQUIN) 500 MG tablet Take 1 tablet (500 mg total) by mouth every other day for 10 days. 7 tablet 0     levothyroxine (SYNTHROID, LEVOTHROID) 50 MCG tablet Take 50 mcg by mouth Daily at 6:00 am. TAKE 1 PILL BY MOUTH EVERY DAY /TXHUA HNUB NOJ 1 LUB TSHUAJ PAB JC THYROID       lisinopril (PRINIVIL,ZESTRIL) 10 MG tablet Take 10 mg by mouth daily. TAKE 1 PILL BY MOUTH DAILY/ COLTON " HNUB NOJ 1 LUB TSHUAJ PAB JC NTSHAV SIAB       metoprolol succinate (TOPROL-XL) 25 MG Take 1 tablet (25 mg total) by mouth daily. 90 tablet 3     miscellaneous medical supply (BLOOD PRESSURE CUFF) Misc Check blood pressure daily as directed. 1 each 0     nitroglycerin (NITROSTAT) 0.4 MG SL tablet Place 0.4 mg under the tongue every 5 (five) minutes as needed for chest pain.       nut.tx.gluc.intol,lac-free,soy (GLUCERNA SHAKE) Liqd Take 1 Can by mouth daily.       polyvinyl alcohol (LIQUITEARS) 1.4 % ophthalmic solution Administer 1 drop to both eyes 4 (four) times a day as needed.        prednisoLONE acetate (PRED-FORTE) 1 % ophthalmic suspension Administer 1 drop to the right eye 3 (three) times a day.       tamsulosin (FLOMAX) 0.4 mg Cp24 Take 1 capsule (0.4 mg total) by mouth daily after supper. 30 capsule 0     tenofovir alafenamide (VEMLIDY) 25 mg Tab tablet Take 25 mg by mouth daily with breakfast. Take with food       tiotropium (SPIRIVA) 18 mcg inhalation capsule Place 18 mcg into inhaler and inhale daily. INHALE THE CONTENTS OF 1 CAPSULE DAILY/ TXHUA HNUB SIV LUB NQUS PA NQUS 1 LUB TSHUAJ       No current facility-administered medications for this visit.        Evelio was provided with a printed AVS, and this care plan was communicated via EMR with his primary care provider, Chandan Shea MD, and is the authorizing prescriber for this visit.  Direct supervision was available by either the patient's PCP or another available physician when needed.    Time spent: 45 minutes    Sherlyn Laws, PharmD  MTM Pharmacist at [facility]

## 2021-06-10 NOTE — PROGRESS NOTES
Hospital discharged & patient outreach f/u:  4/24/17: Spoke with pt. Follow up last hospital discharge. Went over a few questions and goal with update with pt and spouse. Spoke with pt and his wife, Kathleen. Kathleen reported that patient is able to eat a little bit of food; food is tolerable; patient have a PCA; Audi is the youngest son and PCA.; still in need and use oxygen as PRN; still use neb machine to help with breathing; sleep is no problem; appetite is normal; PCA assist pt for his need; Audi, and PCA bring pt to his appt all the time.      Pt states;   -he is feeling a little bit better.   -Will be seeing pcp today at 10:20 AM at [facility].   -is able to breath better with neb machine.   -prefer eating rice portridge and soft food.  -have no pain and being depress or stress today.   -please contact or spoke with my wife, Kathleen and Audi, PCA/son for appt reminder and appt need.     Patient is reminded for f/u appt today and follow discharged physician instructions. Contact the [facility] for meds refill and clinic care guide or pcp office for appt need and resouce info. Patient is informed to contact the emergency line for any SOB, injury/fall that will need to be treat as emergency thing, non-stop chest pain, facial/skin color change and contact the  triage nurse line for urgent question or concern. Verified clinic care guide contact phone number with pt and spouse. Pt confirmed.     Plan:  Outreach frequency: verified this with pt. Pt confirmed as in every two months.

## 2021-06-11 NOTE — PROGRESS NOTES
Medication Therapy Management Follow Up Visit       ASSESSMENT AND PLAN    Medication Management: Difficult to determine what the patient is actually taking as multiple medications are missing today (lisinopril, colchicine, cetirizine, Symbicort). Medications are missing at each visit, however, the missing medications in question usually change. Patient's goal is to consolidate medications as much as possible - we discussed that we cannot safely do this until I know exactly what he is taking. Patient was encouraged to bring ALL medication and pillbox to future visit    COPD: breathing improved since treatment of pneumonitis. Unclear if he is adherent to inhalers. Historically, inhaler technique has been appropriate  -Bring in medications to follow up    CAD/HTN/CHF: controlled (borderline). On appropriate therapy, although, adherence to lisinopril is not certain. It is possible that elevated BPs are due to lisinopril non-adherence. Ideally he would be on both BB and ACEI due to hx of systolic HF and CAD. I believe that he is adherent to metoprolol based on low pulse rate today. Asymptomatic, so will not adjust beta blocker.   -Patient advised to look for lisinopril when he returns home (written out for son to help with). If he is not taking it, he was instructed to resume it. If he is taking it, he was advised to keep taking it and we will recheck his BP when he sees Dr. Shea in 2 weeks. He was able to teach back instructions    HLD: Has tolerated statin without issue, and he may benefit in setting of CAD, however, indication is less clear since patient > 75. He will discuss need for continued use with PCP    Gout: uncontrolled. Unclear if he is taking colchicine. Would not recommend dose > 0.3 mg per day for colchicine based on his renal dysfunction. Patient's 0.6 mg bottle was taped shut and marked with a large X.   -Continue allopurinol 100 mg daily   -Future: Recommend increasing  "allopurinol to 300 mg daily + short course of colchicine 0.3 mg daily for acute flare prophylaxis    Vitamin D Deficiency:   -Due for vitamin D level - defer to next set of labs    FOLLOW-UP PLAN  Evelio was advised to follow up in 2 weeks.      SUBJECTIVE AND OBJECTIVE  Evelio Odell is a 83 y.o. male who was referred by Chandan Shea MD for MTM services.  Evelio's chief concern today is MTM follow up.   is accompanied by a [facility] . He did bring in his medications, but he is a poor historian. Historically, he brings in different medications to each visit. He often is missing a medication at one visit, but brings it to the next visit, making medication reconciliation and determination of what he is actually taking quite difficult. He has an automatic pillbox with an alarm reminder that is set up every week either by his son or his wife. Neither his son, nor his wife, are present here today.     Patient was hospitalized between 4/15 and 4/22/17 for right lung pneumonitis. He was discharged on a course of Augmentin. Feels that his breathing has been improving. Has a hx of COPD and prescribed both Symbicort and Spiriva. Only Spiriva is here today.     He was also started on colchicine 0.3 mg daily for gout while inpatient. He does not have this available today, and is not certain if he has been taking post-discharge. Of note, he does have a prescription for colchicine 0.6 mg capsules - there is a large X on the cap, so presumably it has been held by the family. He does not believe he has been taking it. States he gets gout flares \"several times per year\".     Blood pressure has been borderline elevated for the last few visits. Evelio is concerned about this. He brought his bottle of metoprolol XL 25 mg daily, and he recognizes the medication. He does not have his bottle of lisinopril 10 mg and is not certain if he has been taking it. (Lisinopril last dispensed on 5/13/17, #90). Of note, lisinopril was " missing during last visit as well.     Dm well controlled, no meds. Glucometer 14 day average N=3, 89    Vitals:    06/27/17 1023   BP: 142/74   Pulse: (!) 48     We reviewed Fraser's medication list with them, discussing reason for use, directions for use, and potential side effects of each medication.  Indication, safety, efficacy, and convenience was assessed for all reviewed medications.      Current Outpatient Prescriptions   Medication Sig Dispense Refill     albuterol (PROVENTIL) 2.5 mg /3 mL (0.083 %) nebulizer solution Take 3 mL (2.5 mg total) by nebulization 4 (four) times a day. 75 mL 0     allopurinol (ZYLOPRIM) 100 MG tablet Take 100 mg by mouth daily. TAKE 1 PILL BY MOUTH EVERY DAY/ COLTON HNUB NOJ 1 LUB PAB JC MOB KO TAW VWM       aspirin 81 MG EC tablet Take 81 mg by mouth daily.       atorvastatin (LIPITOR) 10 MG tablet Take 1 tablet (10 mg total) by mouth daily. 90 tablet 3     blood glucose test (GLUCOSE BLOOD) Strp Check blood glucose 2 (two) times daily 200 strip 2     budesonide-formoterol (SYMBICORT) 80-4.5 mcg/actuation inhaler Inhale 2 puffs 2 (two) times a day as needed.        cetirizine (ZYRTEC) 5 MG chewable tablet Chew 5 mg daily.       CHOLECALCIFEROL 1,000 unit tablet Take 1,000 Units by mouth daily.        colchicine 0.6 mg tablet Take 0.5 tablets (0.3 mg total) by mouth daily. 15 tablet 0     diclofenac sodium (VOLTAREN) 1 % Gel Apply 1 application topically 2 (two) times a day as needed. Each application should be 2-4 grams. 1 Tube 3     diphenhydrAMINE (BENADRYL) 25 mg capsule Take 25 mg by mouth every 6 (six) hours as needed for itching, allergies or sleep.       dorzolamide-timolol (COSOPT) 22.3-6.8 mg/mL ophthalmic solution Administer 1 drop to the right eye 2 (two) times a day.        gabapentin (NEURONTIN) 100 MG capsule Take 100 mg by mouth at bedtime. TAKE 1 PILL BY MOUTH EVERY DAY AT BEDTIME/TXMANISHAA HMO THAUM MUS PW NOJ 1 LUB PAB QHOV MOB       GLUCERNA SHAKE Liqd DRINK 1 CAN  "BY MOUTH 3 TIMES A DAY FOR MALNUTRITION/ TXHUA HNUB HAUS 1 POOM 3 ZAUG 93337 mL 11     INJECT EASE LANCETS 30 gauge Misc USED AS DIRECTED. 100 each 3     insulin needles, disposable, (BD ULTRA-FINE MARVIN PEN NEEDLES) 32 x 5/32 \" Ndle Use As Directed. Use as directed.       levothyroxine (SYNTHROID, LEVOTHROID) 50 MCG tablet Take 50 mcg by mouth Daily at 6:00 am. TAKE 1 PILL BY MOUTH EVERY DAY /TXHUA HNUB NOJ 1 LUB TSHUAJ PAB JC THYROID       lisinopril (PRINIVIL,ZESTRIL) 10 MG tablet Take 10 mg by mouth daily.       metoprolol succinate (TOPROL-XL) 25 MG Take 1 tablet (25 mg total) by mouth daily. 90 tablet 3     miscellaneous medical supply (BLOOD PRESSURE CUFF) Misc Check blood pressure daily as directed. 1 each 0     nitroglycerin (NITROSTAT) 0.4 MG SL tablet Place 0.4 mg under the tongue every 5 (five) minutes as needed for chest pain.       polyvinyl alcohol (LIQUITEARS) 1.4 % ophthalmic solution Administer 1 drop to both eyes 4 (four) times a day as needed.        prednisoLONE acetate (PRED-FORTE) 1 % ophthalmic suspension Administer 1 drop to the right eye 3 (three) times a day.       tamsulosin (FLOMAX) 0.4 mg Cp24 Take 1 capsule (0.4 mg total) by mouth daily after supper. 30 capsule 0     tenofovir alafenamide (VEMLIDY) 25 mg Tab tablet Take 25 mg by mouth daily with breakfast. Take with food       tiotropium (SPIRIVA) 18 mcg inhalation capsule Place 18 mcg into inhaler and inhale daily. INHALE THE CONTENTS OF 1 CAPSULE DAILY/ TXHUA HNUB SIV LUB NQUS PA NQUS 1 LUB TSHUAJ       No current facility-administered medications for this visit.        Evelio was provided with a printed AVS, and this care plan was communicated via EMR with his primary care provider, Chandan Shea MD, and is the authorizing prescriber for this visit.  Direct supervision was available by either the patient's PCP or another available physician when needed.    This note has been dictated using voice recognition software. Any " grammatical or context distortions are unintentional and inherent to the software.       Time spent: 60 minutes    Sherlyn Laws PharmD  MTM Pharmacist at [facility]

## 2021-06-11 NOTE — PROGRESS NOTES
Hospital discharged f/u:   6/2/17: Call pt and spoke pt and spouse. Pt is schedule for hospital d/c'd f/u appt with Dr. Shea (pcp). Pt do not go/participate to any of the [facility] for activity and group session. Still get PCA service. Son is his pca person. Pt unsure for how many hours of PCA service he is approved for. Refer pt to ask his son. Pt is reminded for his upcoming appt on 6/5/17 at 2:20 PM with Dr. Shea at [facility]. Pt is reminded to bring all med bottles and glucose monitor (only if he is a diabetes pt) to the appt. Pt and spouse is educated to contact the 911 emergency line for any crisis situation such as non-stop SOB, breathing problem, vision change, facial/skin/lip color change, and fall incident or injury. Pt is direct to pcp for more advise in regards to sign of emergency.     Pt states;  -appetite and sleep is normal.  -declines SOB, chest pain, pain, vision change, blurry vision, fever and numbness or tangling.  -get PCA service.  -still feeling tire due to age but so far pt is doing good per pt.  -still take all meds at prescribe.     Note/comment:  -goal is not due today. Will update at next outreach.

## 2021-06-11 NOTE — PROGRESS NOTES
Pre and post spirometry with diffusion done.  Patient unable to maintain Pleth frequency or consistency.  Ats standards met, testing done with the aid of an .  Results scanned to patients chart.

## 2021-06-11 NOTE — PROGRESS NOTES
Medication Therapy Management Follow Up Visit       ASSESSMENT AND PLAN    1. Medication management  Medications reconciled. All 16 duplicate medication bottles consolidated appropriately for ease of patient understanding. Medications that belong in med-minder sorted and  from PRN medications. /discontinued medications (see list below) were destroyed according to clinic policy - witnessed by our  and son (with the exception of prednisone). Patient threw these away independently. Medication list reconciled and updated in epic. Despite encouragement to discard prednisone, patient will keep this. He was encouraged to call the clinic prior to self-administering.   -Will reach out to PCP re: refill of tens unit patches    2. Facet arthropathy, cervical  Uncontrolled. Patient does not describe s/s of neuropathy, however, he perceives benefit from PRN gabapentin  - Refilled diclofenac sodium (VOLTAREN) 1 % Gel; Apply 2 grams to affected area 2-4 times per day. No more than 8 gm to upper extremity per day  Dispense: 1 Tube; Refill: 3    3. Chronic gout of elbow, unspecified cause, unspecified laterality  Uric acid uncontrolled. Symptoms controlled per patient. Due to time constraints, we were unable to adjust therapy today. At follow up, we will consider dose increase of allopurinol with low dose colchicine for gout flare ppx.     4. Chronic Systolic Congestive Heart Failure  On appropriate therapy. BP improved now that he is again taking lisinopril  -Continue current therapy    FOLLOW-UP PLAN  Evelio was advised to follow up in three months.      SUBJECTIVE AND OBJECTIVE  Evelio Odell is a 83 y.o. male who was referred by Chandan Shea MD for MT services.  Evelio's chief concern today is follow up medication management.   is accompanied by a [facility] , Diamond, and his son.    Patient brought in all of his medications, including discontinued/, and his med  "minder pillbox.  Patient has his pillbox set up on a monthly basis by either his wife or his son.  Additionally, the pill minder alarms until the dose has been taken.  Patient states that he has not missed any doses. Med minder has been set up appropriately. It contains all scheduled medications except for gabapentin, which he takes PRN pain, which he finds to be effective. Pain is still bothersome. He avoids APAP due to hepatic dysfunction. He would like new sticky pads for his tens unit which also provides relief. He has not been using diclofenac gel, which was previously assistive.     All of his medications, except for colchicine, are accounted for today, including the lisinopril 10 mg tablets (previously missing). No adverse effects from medications. He had the following /discontinued medications:    Omeprazole 40 mg daily    Levothyroxine - . Has 2 bottles of non- available    Tamsulosin - . Has bottle of new product available    Aspirin - . Has bottle of new product available    Prednisone 10 mg - not . He keeps available \"if needed\" for gout flares or pain. He prefers to keep this rather than destroy this  He has the following extra medications:    Spiriva - 3 inhalers    Vitamin D - 2 bottles    Gabapentin - 4 bottles    Symbicort - 4 inhalers    Atorvastatin 10 mg - 1 extra bottle    Aspirin    Metoprolol - 3 bottles - 1 bottle of old strength    Eye drops    Levothyroxine - 2 bottles  He is missing the following medications: colchicine    Previously was experiencing frequent gout flares. Today, he feels that this is a non-issue. \"I don't get them very often\". Takes allopurinol 100 mg daily. Last uric acid elevated.     BP Readings from Last 3 Encounters:   17 130/54   17 158/50   17 142/74     Lab Results   Component Value Date    URICACID 8.8 (H) 2017     We reviewed Evelio's medication list with them, discussing reason for use, directions " "for use, and potential side effects of each medication.  Indication, safety, efficacy, and convenience was assessed for all reviewed medications.      Current Outpatient Prescriptions   Medication Sig Dispense Refill     albuterol (PROVENTIL) 2.5 mg /3 mL (0.083 %) nebulizer solution Take 3 mL (2.5 mg total) by nebulization 4 (four) times a day. 75 mL 0     allopurinol (ZYLOPRIM) 100 MG tablet Take 100 mg by mouth daily. TAKE 1 PILL BY MOUTH EVERY DAY/ TXHUA HNUB NOJ 1 LUB PAB JC MOB KO TAW VWM       aspirin 81 MG EC tablet Take 81 mg by mouth daily.       atorvastatin (LIPITOR) 10 MG tablet Take 1 tablet (10 mg total) by mouth daily. 90 tablet 3     blood glucose test (GLUCOSE BLOOD) Strp Check blood glucose 2 (two) times daily 200 strip 2     budesonide-formoterol (SYMBICORT) 80-4.5 mcg/actuation inhaler Inhale 2 puffs 2 (two) times a day as needed.        CHOLECALCIFEROL 1,000 unit tablet Take 1,000 Units by mouth daily.        diclofenac sodium (VOLTAREN) 1 % Gel Apply 1 application topically 2 (two) times a day as needed. Each application should be 2-4 grams. 1 Tube 3     dorzolamide-timolol (COSOPT) 22.3-6.8 mg/mL ophthalmic solution Administer 1 drop to the right eye 2 (two) times a day.        gabapentin (NEURONTIN) 100 MG capsule Take 100 mg by mouth at bedtime. TAKE 1 PILL BY MOUTH EVERY DAY AT BEDTIME/TXHUA HMO THAUM MUS PW NOJ 1 LUB PAB QHOV MOB       GLUCERNA SHAKE Liqd DRINK 1 CAN BY MOUTH 3 TIMES A DAY FOR MALNUTRITION/ TXHUA HNUB HAUS 1 POOM 3 ZAUG [id number] mL 11     INJECT EASE LANCETS 30 gauge Misc USED AS DIRECTED. 100 each 3     insulin needles, disposable, (BD ULTRA-FINE MARVIN PEN NEEDLES) 32 x 5/32 \" Ndle Use As Directed. Use as directed.       levothyroxine (SYNTHROID, LEVOTHROID) 50 MCG tablet Take 50 mcg by mouth Daily at 6:00 am. TAKE 1 PILL BY MOUTH EVERY DAY /TXHUA HNUB NOJ 1 LUB TSHUAJ PAB JC THYROID       lisinopril (PRINIVIL,ZESTRIL) 10 MG tablet Take 10 mg by mouth daily.       " metoprolol succinate (TOPROL-XL) 25 MG Take 1 tablet (25 mg total) by mouth daily. 90 tablet 3     miscellaneous medical supply (BLOOD PRESSURE CUFF) Misc Check blood pressure daily as directed. 1 each 0     nitroglycerin (NITROSTAT) 0.4 MG SL tablet Place 0.4 mg under the tongue every 5 (five) minutes as needed for chest pain.       polyvinyl alcohol (LIQUITEARS) 1.4 % ophthalmic solution Administer 1 drop to both eyes 4 (four) times a day as needed.        prednisoLONE acetate (PRED-FORTE) 1 % ophthalmic suspension Administer 1 drop to the right eye 3 (three) times a day.       SYMBICORT 80-4.5 mcg/actuation inhaler INHALE 2 PUFFS BY MOUTH 2 (TWO) TIMES A DAY. RINSE MOUTH AFTER USE/ TXHUA HNUB NQUS 2 PA OB ZAUG. YAUG NCAUJ RIKI QAB SIV TAS 10.2 Inhaler 6     tamsulosin (FLOMAX) 0.4 mg Cp24 Take 1 capsule (0.4 mg total) by mouth daily after supper. 30 capsule 0     tenofovir alafenamide (VEMLIDY) 25 mg Tab tablet Take 25 mg by mouth daily with breakfast. Take with food       tiotropium (SPIRIVA) 18 mcg inhalation capsule Place 18 mcg into inhaler and inhale daily. INHALE THE CONTENTS OF 1 CAPSULE DAILY/ TXHUA HNUB SIV LUB NQUS PA NQUS 1 LUB TSHUAJ       No current facility-administered medications for this visit.        Evelio was provided with a printed AVS, and this care plan was communicated via EMR with his primary care provider, Chandan Shea MD, and is the authorizing prescriber for this visit.  Direct supervision was available by either the patient's PCP or another available physician when needed.    This note has been dictated using voice recognition software. Any grammatical or context distortions are unintentional and inherent to the software.       Time spent: 65 minutes    Sherlyn Laws, PharmD  MTM Pharmacist at [facility]

## 2021-06-11 NOTE — PROGRESS NOTES
[facility] PRE-OPERATIVE VISIT FOR:    Evelio Odell,  2/15/1934, MRN [id number]    Upcoming surgery date: July 10  Surgeon: Nicole  Surgery Facility: [facility]    Chief Complaint: Preop anesthesia assessment history of obstructive sleep apnea, COPD, chronic kidney disease stage III, coronary artery disease, gout and hepatitis B with cirrhosis    Patient is to have CT-guided microwave therapy of a liver tumor, presumed hepatocellular carcinoma    PCP: Chandan Shea MD, [phone]    SUBJECTIVE:  History of Present Illness:   Evelio Odell is a 83 y.o. male with hepatocellular carcinoma recently diagnosed in patient with chronic hepatitis B cirrhosis.    Patient has recently had pulmonary issues.  Background of COPD and pulmonary fibrosis.  He was hospitalized between 4/15 and 17 for right lung pneumonitis.  Was readmitted to the hospital between  and 17 for spontaneous pneumothorax that occurred in right lung.  He is now doing significantly better.    Patient has history of chronic shortness of breath and wheezing.    Patient also notes that he has chest pressure that comes on with exertion and gradually goes away after about 10 minutes.    Patient had a non-Q-wave MI during hospitalization in  for sepsis.  He subsequently had a stress MRI in August of that year which showed small mid distal septum ischemia.  [employer] prior to this 2013 showed an 75% LAD lesion and a 95% obtuse marginal lesion with minimal disease in the RCA.  He has been managed medically since this time.  His pattern is stable.    Chart indicates that he has had spirometry Past Medical History:  Patient Active Problem List   Diagnosis     Adult Sleep Apnea     Obstructive Sleep Apnea     Cholelithiasis With Bile Duct Calculi & Acute Cholecystitis     Thrombophlebitis Of Brachial Vein     Esophageal reflux     History of COPD     CKD stage 3 secondary to diabetes     Hypothyroidism     Type 2 diabetes  mellitus with diabetic nephropathy, without long-term current use of insulin     Anemia     Chronic Systolic Congestive Heart Failure     Cervicalgia     Coronary atherosclerosis of unspecified type of vessel, native or graft     Gout     Acute cholecystitis     Urinary retention     Cerebral vascular accident     Pulmonary fibrosis     Depression     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     Other emphysema     Controlled substance agreement signed     Pneumonia     TWYLA (acute kidney injury)     Hypotension     CAP (community acquired pneumonia)     Leukocytosis, unspecified type     Mild intermittent asthma with acute exacerbation     Advanced directives, counseling/discussion     Spontaneous pneumothorax     Chest pain on breathing     Fever in other diseases     Hyponatremia     Nodule on liver     Past Surgical History:   Procedure Laterality Date     ABDOMINAL SURGERY       CATARACT EXTRACTION Right     per pt report     ENTROPIAN REPAIR       EYE SURGERY       LAPAROSCOPIC CHOLECYSTECTOMY N/A 8/24/2015    Procedure: CHOLECYSTECTOMY LAPAROSCOPIC, LYSIS OF ADHESIONS;  Surgeon: Kushal Lilly MD;  Location: [facility];  Service:      NH CORNEAL TRANSPLANT,LAMELLAR      Description: Cornea Transplant;  Recorded: 12/04/2013;     NH CORNEAL TRANSPLANT,LAMELLAR      Description: Cornea Transplant;  Recorded: 12/04/2013;     History of bleeding: No    History of tobacco use: No    History of anesthesia reactions: No    Social History:  Patient is , has been a smoker, not currently smoking  Family History:  Family History   Problem Relation Age of Onset     Diabetes Mother      Diabetes Brother      Diabetes Sister      Diabetes Sister        Current Medications:  Current Outpatient Prescriptions   Medication Sig Dispense Refill     albuterol (PROVENTIL) 2.5 mg /3 mL (0.083 %) nebulizer solution Take 3 mL (2.5 mg total) by nebulization 4 (four) times a day. 75 mL 0     allopurinol (ZYLOPRIM) 100 MG  "tablet Take 100 mg by mouth daily. TAKE 1 PILL BY MOUTH EVERY DAY/ TXHUA HNUB NOJ 1 LUB PAB JC MOB KO TAW VWM       aspirin 81 MG EC tablet Take 81 mg by mouth daily.       atorvastatin (LIPITOR) 10 MG tablet Take 1 tablet (10 mg total) by mouth daily. 90 tablet 3     blood glucose test (GLUCOSE BLOOD) Strp Check blood glucose 2 (two) times daily 200 strip 2     budesonide-formoterol (SYMBICORT) 80-4.5 mcg/actuation inhaler Inhale 2 puffs 2 (two) times a day as needed.        cetirizine (ZYRTEC) 5 MG chewable tablet Chew 5 mg daily.       CHOLECALCIFEROL 1,000 unit tablet Take 1,000 Units by mouth daily.        colchicine 0.6 mg tablet Take 0.5 tablets (0.3 mg total) by mouth daily. 15 tablet 0     diclofenac sodium (VOLTAREN) 1 % Gel Apply 1 application topically 2 (two) times a day as needed. Each application should be 2-4 grams. 1 Tube 3     diphenhydrAMINE (BENADRYL) 25 mg capsule Take 25 mg by mouth every 6 (six) hours as needed for itching, allergies or sleep.       dorzolamide-timolol (COSOPT) 22.3-6.8 mg/mL ophthalmic solution Administer 1 drop to the right eye 2 (two) times a day.        gabapentin (NEURONTIN) 100 MG capsule Take 100 mg by mouth at bedtime. TAKE 1 PILL BY MOUTH EVERY DAY AT BEDTIME/TXHUA HMO THAUM MUS PW NOJ 1 LUB PAB QHOV MOB       GLUCERNA SHAKE Liqd DRINK 1 CAN BY MOUTH 3 TIMES A DAY FOR MALNUTRITION/ TXHUA HNUB HAUS 1 POOM 3 ZAUG [id number] mL 11     INJECT EASE LANCETS 30 gauge Misc USED AS DIRECTED. 100 each 3     insulin needles, disposable, (BD ULTRA-FINE MARVIN PEN NEEDLES) 32 x 5/32 \" Ndle Use As Directed. Use as directed.       levothyroxine (SYNTHROID, LEVOTHROID) 50 MCG tablet Take 50 mcg by mouth Daily at 6:00 am. TAKE 1 PILL BY MOUTH EVERY DAY /TXHUA HNUB NOJ 1 LUB TSHUAJ PAB JC THYROID       lisinopril (PRINIVIL,ZESTRIL) 10 MG tablet Take 10 mg by mouth daily.       metoprolol succinate (TOPROL-XL) 25 MG Take 1 tablet (25 mg total) by mouth daily. 90 tablet 3     miscellaneous " medical supply (BLOOD PRESSURE CUFF) Misc Check blood pressure daily as directed. 1 each 0     nitroglycerin (NITROSTAT) 0.4 MG SL tablet Place 0.4 mg under the tongue every 5 (five) minutes as needed for chest pain.       polyvinyl alcohol (LIQUITEARS) 1.4 % ophthalmic solution Administer 1 drop to both eyes 4 (four) times a day as needed.        prednisoLONE acetate (PRED-FORTE) 1 % ophthalmic suspension Administer 1 drop to the right eye 3 (three) times a day.       tamsulosin (FLOMAX) 0.4 mg Cp24 Take 1 capsule (0.4 mg total) by mouth daily after supper. 30 capsule 0     tenofovir alafenamide (VEMLIDY) 25 mg Tab tablet Take 25 mg by mouth daily with breakfast. Take with food       tiotropium (SPIRIVA) 18 mcg inhalation capsule Place 18 mcg into inhaler and inhale daily. INHALE THE CONTENTS OF 1 CAPSULE DAILY/ TXHUA HNUB SIV LUB NQUS PA NQUS 1 LUB TSHUAJ       No current facility-administered medications for this visit.        Allergies:  Review of patient's allergies indicates no known allergies.    Review or Systems:  Constitution: normal  HEENT: normal  Pulmonary: normal  Cardiovascular: normal  GI: normal  : normal  Musculoskeletal: normal  Neuro: normal  Endocrine: normal  Psych: normal  Lymph/Heme: normal    OBJECTIVE:  Vitals:    06/23/17 1019   BP: 150/78   Pulse: (!) 48   Resp:    Temp:    SpO2:      General Appearance:    Alert, cooperative, no distress, appears stated age   Head:    Normocephalic, without obvious abnormality, atraumatic   Eyes:    PERRL, conjunctiva/corneas clear, EOM's intact   Nose:   Mucosa moist, normal    Throat:   Lips, mucosa, and tongue normal; ora phaynx clear   Neck:   Supple, symmetrical, trachea midline, no adenopathy;     thyroid:  no enlargement/tenderness/nodules; no carotid    bruit or JVD   Lungs:    bilateral expiratory wheezes and decreased breath sounds.      Heart:    Regular rate and rhythm, S1 and S2 normal, no murmur, rub   or gallop   Abdomen:     Soft,  non-tender, bowel sounds active all four quadrants,     no masses, no organomegaly   Extremities:   Extremities normal, atraumatic, no cyanosis or edema       Skin:   Skin color, texture, turgor normal, no rashes or lesions   Neurologic:   No focal deficits       This was done in the office today and does not meet criteria for reproducibility.  Indeed, we measured an FEV1 of 4.1 which is much higher than what would be expected so I do not believe this test.  However, nurse indicates that patient was able to generate great amount of expiratory force .        Labs:  Results for orders placed or performed in visit on 06/23/17   INR   Result Value Ref Range    INR 1.00 0.90 - 1.10     Hemogram and basic metabolic profile are in process    Chest x-ray is personally reviewed and indicates scarring at both bases, right greater than left.  No infiltrates.  ASSESSMENT/PLAN:  1. Preop examination  HM2(CBC w/o Differential)    INR    Basic Metabolic Panel    XR Chest PA and Lateral   2. CKD stage 3 secondary to diabetes     3. Essential hypertension     4. COPD (chronic obstructive pulmonary disease)     5. Atherosclerosis of native coronary artery of native heart without angina pectoris     6. Hepatocellular carcinoma       Patient is intermediate to high cardiac risk patient going through low intermediate risk procedure.  Thus with the pattern of stable angina and no recent cardiac event, he is an acceptable risk from cardiac standpoint.  Respiratory status is a question and will obtain formal PFTs prior to proceeding with surgery.  Assuming he has an FEV1 of 1.0 or greater, I will approve him for surgery without reservation.    He will take metoprolol and lisinopril on morning of surgery with inhalers.  Patient is not currently on insulin  Chandan Shea MD     Addendum 7/5/17  PFTs obtained today, see results below.  Prebronchodilator FEV1 equals 1.01, post 1.23  Marginally acceptable for surgery.  Recommend  consideration of preop bronchodilator      FEV1/FVC is 48% and is reduced.  FEV1 is 76% predicted and is reduced.  FVC is 108% predicted and normal.  There was improvement in spirometry after a single inhaled dose of bronchodilator.  DLCO is 50% predicted and is reduced.     Impression:  Full Pulmonary Function Test is abnormal.  PFTs are consistent with moderate  obstructive disease.  Bronchodilator response is consistent with reversibility.  Unable to perform maneuvers for lung volume measurements.    However, a normal spirometric vital capacity argues against any significant restrictive disease.    Diffusion capacity when corrected for hemoglobin is severely reduced.      PFTs would be consistent with someone who has COPD     Jose Thompson MD  Pulmonary and Critical Care  Pager [phone]     Second addendum 8/9/17  Patient's surgery postponed due to lack of data available above.  Scheduled to see cardiology and pulmonary later this month before the surgery which is now scheduled August 30 or 31.    Patient's overall condition is stable.  Brief hospitalization for anaphylactic response due to bee or wasp stings 8/2 through 8/3/17.  Patient is made a full recovery from this.  Current condition is stable as above.  Brief exam unchanged, does show expiratory wheezing.  Results for orders placed or performed in visit on 06/23/17   HM2(CBC w/o Differential)   Result Value Ref Range    WBC 7.1 4.0 - 11.0 thou/uL    RBC 4.68 4.40 - 6.20 mill/uL    Hemoglobin 13.6 (L) 14.0 - 18.0 g/dL    Hematocrit 41.2 40.0 - 54.0 %    MCV 88 80 - 100 fL    MCH 28.9 27.0 - 34.0 pg    MCHC 32.9 32.0 - 36.0 g/dL    RDW 13.9 11.0 - 14.5 %    Platelets 204 140 - 440 thou/uL    MPV 7.8 7.0 - 10.0 fL   INR   Result Value Ref Range    INR 1.00 0.90 - 1.10   Basic Metabolic Panel   Result Value Ref Range    Sodium 142 136 - 145 mmol/L    Potassium 4.7 3.5 - 5.0 mmol/L    Chloride 112 (H) 98 - 107 mmol/L    CO2 24 22 - 31 mmol/L    Anion Gap,  Calculation 6 5 - 18 mmol/L    Glucose 123 70 - 125 mg/dL    Calcium 9.2 8.5 - 10.5 mg/dL    BUN 19 8 - 28 mg/dL    Creatinine 1.47 (H) 0.70 - 1.30 mg/dL    GFR MDRD Af Amer 55 (L) >60 mL/min/1.73m2    GFR MDRD Non Af Amer 46 (L) >60 mL/min/1.73m2     Per cardiology and pulmonary through [facility] which would be available on care everywhere as would my reports-acceptable candidate if cleared by cardiology and pulmonary as discussed above.

## 2021-06-11 NOTE — PROGRESS NOTES
Over 25 minutes spent greater than 50% of this counseling regarding following issues:  1. Nodule on liver  Presumed hepatocellular carcinoma.  Planned CT directed microwave treatment.    2. Essential hypertension  Above optimal today, about 150-160/90 and this is similar to previous pressures.  I am not inclined to increase beta-blocker since his pulse is 50  3. Atherosclerosis of native coronary artery of native heart without angina pectoris  Stable angina pattern, underlying CHF as well, stable.  Double surgical candidate from the standpoint.  4. COPD (chronic obstructive pulmonary disease)  Are generally acceptable FEV1, spoke with son about this    5. Healthcare maintenance  Discussed/encouraged healthcare directives.

## 2021-06-12 NOTE — PROGRESS NOTES
Patient outreach:  [facility] was pull for a different patient with crisis need.    Was not able to see this pt in clinic today.   Will reach out to patient via phone today by open another pt encounter.

## 2021-06-12 NOTE — PROGRESS NOTES
Patient Outreach follow up:  8/9/17: [facility] call pt and Kathleen, spouse answer the phone.   Asked to speak with patient.     Kathleen stated;  -spouse was just in the clinic today seeing his pcp. He is tire and take a nap at the moment.   -spouse have a liver problem and planned to get treatment for. Met with his primary doctor today.  -was told to see the heart and lung doctor. Son take care of spouse. Son will manage the appt.   -spouse appetite is poor; drink prescribe shake daily.  -sleep is normal per her observation.  -most of the time, spouse mention he is tire and have low physical energy. Maybe due to his age.  -Kathleen and patient live with family and children on the same house. Both feel safe.    [facility] introduced self to patient's spouse. [facility] have spoke with this patient spouse in the past. Patient did gave verbal permission to [facility] to se[facility] have verified the pt goal with spouse. See goal for updated action steps (if need).  Encouraged spouse and family to assist patient to improve daily exercise to at least 4-5 times a week and rest when need.   Spouse is educated make sure someone is stand by assist pt to prevent fall incident. Contact the emergency line if pt is having any SOB, chest pain cause change of facial color or lip color. Contact pcp office or HE triage nurse line at [phone] for any unsure act upon health condition.  Encouraged spouse, patient, and family to seek more advise from pcp.   [facility] phone number is given to patient's spouse for any question herself, patient, or family may have for this patient.   Spouse confirmed understand and have no questions or concerns.

## 2021-06-12 NOTE — PROGRESS NOTES
Pulmonary Clinic Outpatient Consultation    Assessment and Plan:   Evelio Odell is a 83 y.o. 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    Preoperative pulmonary evaluation:  The patient has moderate obstructive lung disease with FEV1 1.01 L (63% predicted) and corrected DLCO 50% predicted.  There is likely a component of COPD from prior biofuel smoke exposure in [facility], in addition to an asthmatic or at least bronchodilator-reversible component.  He is currently not using his inhalers correctly (uses ICS-LABA as needed rather than scheduled) and could benefit from a leukotriene inhibitor.  Has symptoms suggestive of allergic rhinitis, which may also worsen his asthma control.  As I discussed with the patient and his son, there is no absolute contraindication to general anesthesia, though his chronic lung disease will modestly increase the risk of perioperative pulmonary complications.    Plan:  - advised consistent use of budesonide-formoterol 80-4.5 mcg two inhalations BID rather than as-needed; rinse/gargle/spit water after use  - continue tiotropium 18 mcg inhaled daily  - prescribed an albuterol HFA for as-needed use (also has a nebulizer at home)  - start montelukast 10 mg qHS  - start nasal fluticasone one spray in each nostril daily  - modestly increased risk of perioperative pulmonary complications, but no absolute contraindication to general anesthesia  - would advise scheduled nebulized albuterol-ipratropium every 4 hours in the perioperative period if the patient is unable to use his home inhaler  regimen  - minimize opiates and use aggressive pulmonary hygiene, early mobilization, and incentive spirometry in the postoperative period  - follow-up in 3 months  - encouraged him or his son to call any time with questions or concerning symptoms    I appreciate the opportunity to participate in the care of Mr. Odell.  Please feel free to contact me at any time.    CCx: preoperative pulmonary evaluation    HPI: Evelio Odell is a 83 y.o. 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  He was exposed to biofuel smoke in [facility] prior to immigration; potentially extensive exposure per his son.  Has wheezing with the cold, and frequent nasal pruritus.  Still coughs but less than he had been.  Using Symbicort as needed rather than scheduled BID.  Uses Spiriva daily.  Had severe RLL infiltrate in April but improved, and swallow study showed no dysphagia.  Had asthma as a child in [facility].    ROS:  A 12-system review was obtained and was negative with the exception of the symptoms endorsed in the history of present illness.    PMH:  Thrombocytopenia  Stroke with some residual right hemiparesis by report  H/O sepsis  CAD  Hypothyroidism  HTN  Dyslipidemia  Hepatitis B  Cirrhosis  Recently diagnosed early-stage hepatic lesion, presumed hepatocellular carcinoma  Gout  GERD  DM2  CKD  Cervicalgia  Cholelithiasis s/p lap julius  Chronic right apical fibroatelectasis  H/O right PTX  H/O arm DVT    PSH:  Past Surgical History:   Procedure Laterality Date     ABDOMINAL SURGERY       CATARACT EXTRACTION Right     per pt report      ENTROPIAN REPAIR       EYE SURGERY       LAPAROSCOPIC CHOLECYSTECTOMY N/A 8/24/2015    Procedure: CHOLECYSTECTOMY LAPAROSCOPIC, LYSIS OF ADHESIONS;  Surgeon: Kushal Lilly MD;  Location: [facility];  Service:      CT CORNEAL TRANSPLANT,LAMELLAR      Description: Cornea Transplant;  Recorded: 12/04/2013;     CT CORNEAL TRANSPLANT,LAMELLAR      Description: Cornea Transplant;  Recorded: 12/04/2013;       Allergies:  Allergies   Allergen Reactions     Bee Pollen Anaphylaxis     Anaphylactic reaction 8/2/17 after multiple bee stings       Family HX:  Family History   Problem Relation Age of Onset     Diabetes Mother      Diabetes Brother      Diabetes Sister      Diabetes Sister        Social Hx:  Social History     Social History     Marital status:      Spouse name: N/A     Number of children: N/A     Years of education: N/A     Occupational History     Not on file.     Social History Main Topics     Smoking status: Never Smoker     Smokeless tobacco: Never Used     Alcohol use No     Drug use: No     Sexual activity: Not on file     Other Topics Concern     Not on file     Social History Narrative       Current Meds:  Current Outpatient Prescriptions   Medication Sig Dispense Refill     acetaminophen (TYLENOL) 500 MG tablet Take 1 tablet (500 mg total) by mouth every 4 (four) hours as needed for pain. 30 tablet 0     albuterol (PROAIR HFA;PROVENTIL HFA;VENTOLIN HFA) 90 mcg/actuation inhaler Inhale 1-2 puffs every 6 (six) hours as needed for wheezing or shortness of breath. 1 Inhaler 11     albuterol (PROVENTIL) 2.5 mg /3 mL (0.083 %) nebulizer solution Take 3 mL (2.5 mg total) by nebulization 4 (four) times a day. 75 mL 0     allopurinol (ZYLOPRIM) 100 MG tablet Take 100 mg by mouth daily. TAKE 1 PILL BY MOUTH EVERY DAY/ COLTON GARDUNO NOJ 1 LUB PAB JC MOB KO TAW VWM       aspirin 81 MG EC tablet Take 81 mg by mouth daily.       atorvastatin (LIPITOR) 40 MG tablet Take 1 tablet (40 mg total) by mouth  daily. 90 tablet 4     budesonide-formoterol (SYMBICORT) 80-4.5 mcg/actuation inhaler (changed to scheduled use) Inhale 2 puffs 2 (two) times a day. 1 Inhaler 11     cetirizine (ZYRTEC) 5 MG tablet Take 5 mg by mouth daily.       CHOLECALCIFEROL 1,000 unit tablet Take 1,000 Units by mouth daily.        colchicine 0.6 mg tablet Take 0.3 mg by mouth daily.       diclofenac sodium (VOLTAREN) 1 % Gel Apply topically 2 (two) times a day as needed.       diphenhydrAMINE (BENADRYL) 25 mg tablet Take 1 tablet (25 mg total) by mouth 2 (two) times a day as needed (pruritus, rash). 15 tablet 0     dorzolamide-timolol (COSOPT) 22.3-6.8 mg/mL ophthalmic solution Administer 1 drop to the right eye 2 (two) times a day.        fluticasone (FLONASE) 50 mcg/actuation nasal spray (new medication this visit) One spray in each nostril daily. 16 g 11     gabapentin (NEURONTIN) 100 MG capsule TAKE 1 PILL BY MOUTH EVERY DAY AT BEDTIME/TXHUA HMO THAUM MUS PW NOJ 1 LUB PAB QHOV MOB LEEG 30 capsule 0     GLUCERNA SHAKE Liqd DRINK 1 CAN BY MOUTH 3 TIMES A DAY FOR MALNUTRITION/ TXHUA HNUB HAUS 1 POOM 3 ZAUG 18700 mL 11     levothyroxine (SYNTHROID, LEVOTHROID) 50 MCG tablet Take 50 mcg by mouth Daily at 6:00 am. TAKE 1 PILL BY MOUTH EVERY DAY /TXHUA HNUB NOJ 1 LUB TSHUAJ PAB JC THYROID       lisinopril (PRINIVIL,ZESTRIL) 10 MG tablet Take 10 mg by mouth daily.       miscellaneous medical supply (BLOOD PRESSURE CUFF) Misc Check blood pressure daily as directed. 1 each 0     montelukast (SINGULAIR) 10 mg tablet Take 1 tablet (10 mg total) by mouth at bedtime. 30 tablet 11     nitroglycerin (NITROSTAT) 0.4 MG SL tablet Place 0.4 mg under the tongue every 5 (five) minutes as needed for chest pain.       polyvinyl alcohol (LIQUIFILM TEARS) 1.4 % ophthalmic solution Administer 1 drop to both eyes as needed for dry eyes.       prednisoLONE acetate (PRED-FORTE) 1 % ophthalmic suspension Administer 1 drop to the right eye 3 (three) times a day.        "tamsulosin (FLOMAX) 0.4 mg Cp24 Take 0.4 mg by mouth daily.       tenofovir alafenamide (VEMLIDY) 25 mg Tab tablet Take 25 mg by mouth daily with breakfast. Take with food       tiotropium (SPIRIVA) 18 mcg inhalation capsule Place 18 mcg into inhaler and inhale daily. INHALE THE CONTENTS OF 1 CAPSULE DAILY/ TXHUA HNUB SIV LUB NQUS PA NQUS 1 LUB TSHUAJ       traMADol (ULTRAM) 50 mg tablet Take 50 mg by mouth every 6 (six) hours as needed for pain.       No current facility-administered medications for this visit.        Physical Exam:  /60  Pulse (!) 54  Resp 18  Ht 4' 10\" (1.473 m)  Wt 113 lb (51.3 kg)  SpO2 96% Comment: RA  BMI 23.62 kg/m2  Gen: alert, oriented, no distress  HEENT: nasal turbinates are mildly edematous, no oropharyngeal lesions, no cervical or supraclavicular lymphadenopathy  CV: RRR, no M/G/R  Resp: diffuse bilateral wheezing and prolonged expiratory phase  Abd: soft, nontender, no palpable organomegaly  Skin: no apparent rashes  Ext: no cyanosis, clubbing or edema  Neuro: alert, nonfocal    Imaging studies:  Pharmacologic MPI (8/17/17):  - no evidence of ischemia  - small fixed apical defect  - EF 52%    TTE (August 2016)  - EF 55%  - no valve disease    CT chest (4/20/17):  - directly reviewed  - RUL band-like fibroatelectasis  - RLL ground glass infiltrate c/w pneumonitis    CXR (6/23/17):  - right basilar opacity improved since April  - right apical scarring    Fluoro swallow study (January 2016):  - no aspiration    PFT's (7/5/17):  FEV1/FVC is 0.48 and is reduced.  FEV1 is 1.01 L (63% predicted) and is reduced.  FVC is 2.09 L (104% predicted) and normal.  There was improvement in spirometry after a single inhaled dose of bronchodilator.  The patient was unable to maintain a pant frequency or consistency to reliably complete plethysmography.  DLCO is 50% predicted and is reduced when it is corrected for hemoglobin.    Marcell Palacios MD  [facility]  Cell " [phone]  Office [phone]  Pager [phone]

## 2021-06-12 NOTE — PROGRESS NOTES
Assessment/ Plan  Over 25 minutes spent with patient and son today, greater than 50% of this counseling  1. Hospital discharge follow-up  Following anaphylaxis to wasp stings.  Currently doing well.  He recovered  2. Preop examination  See previous notes regarding this.  Had planned microwave treatment of liver tumor, presumed hepatitis B related had a cellular carcinoma in early August.  This was canceled by anesthesiologist due to concerns of safety and lack of information per son who is here today.  Please refer to my preop 6/23/17.  I felt that the patient was an acceptable concern from a cardiac standpoint due to intermediate risk of procedure and intermediate cardiac risk.  I Was concerned about his COPD and ordered PFTs which were marginal.  FEV1 was above 1.0 so surgery was approved.  Apparently they were never received this information or addendum.  They asked that he see a cardiologist and a pulmonary doctor which is now planned.  Physical and history was reviewed today with patient and updated, addendum made which was faxed to [employer].  Has appointments for pulmonary and cardiology coming up as well.  3. COPD (chronic obstructive pulmonary disease)  As above, no change, no changes    4. Coronary artery disease  As above    Body mass index is 20.3 kg/(m^2).    Subjective  CC:  Chief Complaint   Patient presents with     Hospital Visit Follow Up     Allergic reaction     HPI:  As above, review note from 6/23 and as follows:  Hospital Follow-up  Date of admission: 8/2  Date of discharge: 8/3  Hospital: [facility]  Chief Diagnosis: Anaphylaxis to bee sting  Narrative: Patient with most multiple wasp stings while working the garden, better by the time he was in the hospital.  Given EpiPen, corticosteroid and antihistamine.  Low blood pressure at first, then resolved.  Doing well by time of discharge    Procedures During hospital stay not mentioned: None  Radiology studies: None  Labs: Basic metabolic 8/3 was  stable as was hemoglobin at 13.3   OdellEvelio   Home Medication Instructions BARRY:    Printed on:08/09/17 4616   Medication Information                      acetaminophen (TYLENOL) 500 MG tablet  Take 1 tablet (500 mg total) by mouth every 4 (four) hours as needed for pain.             albuterol (PROVENTIL) 2.5 mg /3 mL (0.083 %) nebulizer solution  Take 3 mL (2.5 mg total) by nebulization 4 (four) times a day.             allopurinol (ZYLOPRIM) 100 MG tablet  Take 100 mg by mouth daily. TAKE 1 PILL BY MOUTH EVERY DAY/ TXA HNUB NOJ 1 LUB PAB JC MOB KO TAW VWM             aspirin 81 MG EC tablet  Take 81 mg by mouth daily.             atorvastatin (LIPITOR) 10 MG tablet  Take 1 tablet (10 mg total) by mouth daily.             budesonide-formoterol (SYMBICORT) 80-4.5 mcg/actuation inhaler  Inhale 2 puffs 2 (two) times a day as needed.              CHOLECALCIFEROL 1,000 unit tablet  Take 1,000 Units by mouth daily.              diphenhydrAMINE (BENADRYL) 25 mg tablet  Take 1 tablet (25 mg total) by mouth 2 (two) times a day as needed (pruritus, rash).             dorzolamide-timolol (COSOPT) 22.3-6.8 mg/mL ophthalmic solution  Administer 1 drop to the right eye 2 (two) times a day.              gabapentin (NEURONTIN) 100 MG capsule  TAKE 1 PILL BY MOUTH EVERY DAY AT BEDTIME/TXA HMO THAUM MUS PW NOJ 1 LUB PAB QHOV MOB LEEG             GLUCERNA SHAKE Liqd  DRINK 1 CAN BY MOUTH 3 TIMES A DAY FOR MALNUTRITION/ TXA HNUB HAUS 1 POOM 3 ZAUG             levothyroxine (SYNTHROID, LEVOTHROID) 50 MCG tablet  Take 50 mcg by mouth Daily at 6:00 am. TAKE 1 PILL BY MOUTH EVERY DAY /TXA HNUB NOJ 1 LUB TSHUAJ PAB JC THYROID             lisinopril (PRINIVIL,ZESTRIL) 10 MG tablet  Take 10 mg by mouth daily.             metoprolol succinate (TOPROL-XL) 25 MG  Take 1 tablet (25 mg total) by mouth daily.             miscellaneous medical supply (BLOOD PRESSURE CUFF) Misc  Check blood pressure daily as directed.              nitroglycerin (NITROSTAT) 0.4 MG SL tablet  Place 0.4 mg under the tongue every 5 (five) minutes as needed for chest pain.             tenofovir alafenamide (VEMLIDY) 25 mg Tab tablet  Take 25 mg by mouth daily with breakfast. Take with food             tiotropium (SPIRIVA) 18 mcg inhalation capsule  Place 18 mcg into inhaler and inhale daily. INHALE THE CONTENTS OF 1 CAPSULE DAILY/ TXHUA HNUB SIV LUB NQUS PA NQUS 1 LUB TSHUAJ               Follow-up recommended by discharging doctor: With primary  PFSH:  Current medications reviewed as follows:  Current Outpatient Prescriptions on File Prior to Visit   Medication Sig     acetaminophen (TYLENOL) 500 MG tablet Take 1 tablet (500 mg total) by mouth every 4 (four) hours as needed for pain.     albuterol (PROVENTIL) 2.5 mg /3 mL (0.083 %) nebulizer solution Take 3 mL (2.5 mg total) by nebulization 4 (four) times a day.     allopurinol (ZYLOPRIM) 100 MG tablet Take 100 mg by mouth daily. TAKE 1 PILL BY MOUTH EVERY DAY/ TXHUA HNUB NOJ 1 LUB PAB JC MOB KO TAW VWM     aspirin 81 MG EC tablet Take 81 mg by mouth daily.     atorvastatin (LIPITOR) 10 MG tablet Take 1 tablet (10 mg total) by mouth daily.     budesonide-formoterol (SYMBICORT) 80-4.5 mcg/actuation inhaler Inhale 2 puffs 2 (two) times a day as needed.      CHOLECALCIFEROL 1,000 unit tablet Take 1,000 Units by mouth daily.      diphenhydrAMINE (BENADRYL) 25 mg tablet Take 1 tablet (25 mg total) by mouth 2 (two) times a day as needed (pruritus, rash).     dorzolamide-timolol (COSOPT) 22.3-6.8 mg/mL ophthalmic solution Administer 1 drop to the right eye 2 (two) times a day.      gabapentin (NEURONTIN) 100 MG capsule TAKE 1 PILL BY MOUTH EVERY DAY AT BEDTIME/TXHUA HMO THAUM MUS PW NOJ 1 LUB PAB QHOV MOB LEEG     GLUCERNA SHAKE Liqd DRINK 1 CAN BY MOUTH 3 TIMES A DAY FOR MALNUTRITION/ TXHUA HNUB HAUS 1 POOM 3 ZAUG     levothyroxine (SYNTHROID, LEVOTHROID) 50 MCG tablet Take 50 mcg by mouth Daily at 6:00 am. TAKE 1  PILL BY MOUTH EVERY DAY /TXHUA HNUB NOJ 1 LUB TSHUAJ PAB JC THYROID     lisinopril (PRINIVIL,ZESTRIL) 10 MG tablet Take 10 mg by mouth daily.     metoprolol succinate (TOPROL-XL) 25 MG Take 1 tablet (25 mg total) by mouth daily.     miscellaneous medical supply (BLOOD PRESSURE CUFF) Misc Check blood pressure daily as directed.     nitroglycerin (NITROSTAT) 0.4 MG SL tablet Place 0.4 mg under the tongue every 5 (five) minutes as needed for chest pain.     tenofovir alafenamide (VEMLIDY) 25 mg Tab tablet Take 25 mg by mouth daily with breakfast. Take with food     tiotropium (SPIRIVA) 18 mcg inhalation capsule Place 18 mcg into inhaler and inhale daily. INHALE THE CONTENTS OF 1 CAPSULE DAILY/ TXHUA HNUB SIV LUB NQUS PA NQUS 1 LUB TSHUAJ     No current facility-administered medications on file prior to visit.      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  He subsequently had a stress MRI in August of that year which showed small mid distal septum ischemia.  [employer] prior to this 2/2013 showed an 75% LAD lesion and a 95% obtuse marginal lesion with minimal disease in the RCA.  He has been managed medically since this time.  His pattern is stable.       Obstructive Sleep Apnea      Priority: High     Overview Note:     Created by Conversion         Viral hepatitis B chronic      Priority: High     Overview Note:     Created by Conversion    Replacement Utility updated for latest IMO load       History of COPD      Priority: High     Overview Note:     Never a smoker.  6/2013 0.87, 61% of predicted.  Seen Dr. Mitchell in the past.  CT scan  8/20/13 shows volume loss right upper lobe, chronic atelectasis, obliterating block bronchiolitis or constrictive bronchiolitis right lower lobe.  Cylindrical bronchiectasis right lower lobe.  Mediastinal adenopathy with calcifications.    Treated as with COPD.  Seeing [facility] every 6 months.         CKD stage G3b/A1, GFR 30-44 and albumin creatinine ratio <30 mg/g      Priority: High     Overview Note:     9/12/16 Follows with Dr. [facility].  Last seen 8/19.  Creatinine at that time was 1.52.  Nine-month follow-up          Hypothyroidism      Priority: High     Overview Note:     Created by Conversion    Replacement Utility updated for latest IMO load       Urinary retention 05/10/2016     Priority: Medium     Overview Note:     Distended bladder on ER visit for decreased LOC 2/10/16.  Catheter placed and DCed.  Follow-up Urology, Dr Ames. Placed on Tamsulosin, cath removed and PVR = 0       Acute cholecystitis 08/23/2015     Priority: Medium     Depression 05/04/2015     Priority: Medium     Cervicalgia      Priority: Medium     Overview Note:     Followed by spine.  Spondylolisthesis with facet arthropathy.    12/2016 annotation: seen Surgeon- Dawson.  Plan medial branch block injections + PT prior to considering surgery         Cholelithiasis With Bile Duct Calculi & Acute Cholecystitis      Priority: Medium     Overview Note:     Created by Conversion    Replacement Utility updated for latest IMO load       Thrombophlebitis Of Brachial Vein      Priority: Medium     Overview Note:     Created by Conversion         Esophageal reflux      Priority: Medium     Overview Note:     Created by Conversion         Type 2 diabetes mellitus with diabetic nephropathy, without long-term current use of insulin      Priority: Medium     Overview Note:     Created by Conversion         Anemia      Priority: Medium     Overview Note:     Created by Conversion         Hypothyroidism due to acquired  atrophy of thyroid      Anaphylactic reaction, initial encounter 08/02/2017     Gastroparesis 08/02/2017     HCC (hepatocellular carcinoma) 08/02/2017     Nodule on liver 06/01/2017     Overview Note:     May 2017, per letter sent to patient from [facility].  Concerning for cancer.  Patient to be discussed at tumor board and another attempt made to contact him.  The first was unsuccessful.       Hyponatremia 05/31/2017     Chest pain on breathing      Fever in other diseases      Spontaneous pneumothorax 05/24/2017     Advanced directives, counseling/discussion 04/24/2017     CAP (community acquired pneumonia) 04/16/2017     Leukocytosis, unspecified type 04/16/2017     Mild intermittent asthma without complication      Pneumonia 04/15/2017     TWYLA (acute kidney injury)      Other specified hypotension      Controlled substance agreement signed 12/29/2016     Overview Note:     Tylenol No. 3, 75 per month.  One-month post +2 refills, 12/29/16  Indication is chronic cervicalgia       Other emphysema 08/01/2016     Pulmonary fibrosis 04/16/2015     Gastroesophageal reflux disease without esophagitis 05/02/2014     History   Smoking Status     Never Smoker   Smokeless Tobacco     Never Used     Social History     Social History Narrative     Patient Care Team:  Chandan Shea MD as PCP - [facility]   Endy Mulligan MD as Physician (Cardiology)  Maury Uriarte CNP (Inactive) as Nurse Practitioner (Gastroenterology)  Jane Pan CNP as Nurse Practitioner (Pulmonary Disease)  CHRIS Stevens Mai, CMA as Clinic Care Coordination Care Guide (Clinic Care Coordination)  ROS  Full 10 system review including constitutional, respiratory, cardiac, gi, urinary, rheumatologic, neurologic, reproductive, dermatologic psychiatric is  performed (via questionnaire) and is negative      Objective  Physical Exam  Vitals:    08/09/17 1105   BP: 115/42   Patient Site:  Right Arm   Patient Position: Sitting   Cuff Size: Adult Regular   Pulse: (!) 54   Resp: 20   Weight: 111 lb (50.3 kg)     Gen- alert, oriented/ appropriately responsive  HEENT- normal cephalic, atraumatic.   Chest bilateral expiratory wheezing no chest wall deformity or scar.  CV- Heart regular rate and rhythm, normal tones, no murmurs gallops or rubs.  Ext- appear well perfused, no edema  Skin- warm and dry, no visualized rash    Diagnostics  None, reviewed labs as above    Please note: Voice recognition software was used in this dictation.  It may therefore contain typographical errors.

## 2021-06-12 NOTE — PROGRESS NOTES
Patient Outreach follow up:  Reason: hospital discharge follow up    Care Guide called patient and his emergency . Both are not answering [facility]'s calls. Left a voice message for pt to call back. [facility] phone number is include. If this patient is returning my call, please transfer to ext: [phone]. I have called Evelio and have been unsuccessful in reaching this patient for three attempts. I will continue attempting to reach out to this patient in one month.  I will also check this patient's chart for upcoming appointments, ER reports that may contain a new phone number, or any other recent activity.     Note:  Per chart reviewed; pt is schedule on 8/9/17 at 11:00 AM with Dr. Shea at [facility] for a hospital d/c f/u appt.

## 2021-06-12 NOTE — PROGRESS NOTES
[facility] PRE-OPERATIVE VISIT FOR:    Evelio Odell,  2/15/1934, MRN [id number]    Upcoming surgery date: 10/11/17  Surgeon: Shahana  Surgery Facility: [facility]    Chief Complaint: Preop    PCP: Chandan Shea MD, [phone]    SUBJECTIVE:  History of Present Illness:   Evelio Odell is a 83 y.o. male with    83-year-old here for preop physical exam.  Patient is scheduled to have 10/11/17  Gradual, painless visual loss.  He is contralateral surgery went well.    Patient with a very complex medical history.  Recently underwent procedure for microwave treatment for hepatocellular carcinoma.  Had extensive workup including cardiology evaluation (for underlying systolic congestive heart failure and coronary artery disease) as well as pulmonary evaluation (underlying bio fuel triggered lung disease/COPD) he had a noted marginal FEV1 at 1.01.      Part of reason today is for follow-up from hospitalization [facility] for this procedure this is described below:  Date of admission:   Date of discharge:   Hospital: [facility]  Chief Diagnosis: Liver mass with underlying hepatitis B, presumed hepatocellular carcinoma  Narrative: 83-year-old with chronic hepatitis B and cirrhosis noted to have liver mass.  Felt not to be a good candidate for open procedure.  Admitted for microwave ablation.  Patient was valuated by pulmonary and cardiology prior to surgery.  He had had spontaneous pneumothorax about a month prior to surgery which resolved and was back at baseline.  Pulmonologist recommended addition of Singulair to bronchodilators both long-acting and short-acting as well as inhaled steroid.  Cardiologist ordered ultra monitor for bradycardia which came back basically without remarkable findings.  Also increased atorvastatin.    Hospital course was complicated by difficulty voiding and increased postvoid residual.  Patient declined recommended Li catheter.  Dates that he is voided  well after discharge.  Procedures During hospital stay not mentioned: None  Radiology studies: None except intraoperative procedure      Since discharge, patient has developed left sided back pain    Back pain    ---------------------  Duration/timing- worse  For last 3-4 days  Location/ radiation-left  Quality/Severity-constant- same  Context/modifying factors-worse when sleeping  Red flags- progressive weakness, weight loss/ fever, night-time awakening?-No  Back pain history  previous evaluation, treatment, imaging? - not taking   Comments-patient had mild back pain at the time of his pneumothorax.  This reason chest x-ray was done though breath sounds are equal on exam today  Also, family has noted a lump on his left side.  Exam is compatible with lipoma.  This is not tender on palpation    Except for the above problems patient is doing well.  He denies any new/recent chest pain.  Pulmonary status is at baseline.  Past Medical History:  Patient Active Problem List   Diagnosis     Obstructive Sleep Apnea     Cholelithiasis With Bile Duct Calculi & Acute Cholecystitis     Thrombophlebitis Of Brachial Vein     Viral hepatitis B chronic     Esophageal reflux     History of COPD     CKD stage G3b/A1, GFR 30-44 and albumin creatinine ratio <30 mg/g     Hypothyroidism     Type 2 diabetes mellitus with diabetic nephropathy, without long-term current use of insulin     Anemia     Cervicalgia     Coronary atherosclerosis of unspecified type of vessel, native or graft     Gout     Acute cholecystitis     Urinary retention     Cerebral vascular accident     Pulmonary fibrosis     Depression     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     Other emphysema     Controlled substance agreement signed     Pneumonia     TWYLA (acute kidney injury)     Other specified hypotension     CAP (community acquired pneumonia)     Leukocytosis, unspecified type     Mild intermittent asthma without complication     Advanced directives,  counseling/discussion     Spontaneous pneumothorax     Chest pain on breathing     Fever in other diseases     Hyponatremia     Nodule on liver     Anaphylactic reaction, initial encounter     Gastroesophageal reflux disease without esophagitis     Gastroparesis     HCC (hepatocellular carcinoma)     Hypothyroidism due to acquired atrophy of thyroid     Hypercholesteremia     Coronary artery disease of native artery of native heart with stable angina pectoris     Past Surgical History:   Procedure Laterality Date     ABDOMINAL SURGERY       CATARACT EXTRACTION Right     per pt report     ENTROPIAN REPAIR       EYE SURGERY       LAPAROSCOPIC CHOLECYSTECTOMY N/A 8/24/2015    Procedure: CHOLECYSTECTOMY LAPAROSCOPIC, LYSIS OF ADHESIONS;  Surgeon: Kushal Lilly MD;  Location: [facility];  Service:      DC CORNEAL TRANSPLANT,LAMELLAR      Description: Cornea Transplant;  Recorded: 12/04/2013;     DC CORNEAL TRANSPLANT,LAMELLAR      Description: Cornea Transplant;  Recorded: 12/04/2013;     History of bleeding: No    History of tobacco use: No    History of anesthesia reactions: No    Social History:  Patient is , lives with family  Family History:  Family History   Problem Relation Age of Onset     Diabetes Mother      Diabetes Brother      Diabetes Sister      Diabetes Sister        Current Medications:  Current Outpatient Prescriptions   Medication Sig Dispense Refill     acetaminophen (TYLENOL) 500 MG tablet Take 1 tablet (500 mg total) by mouth every 4 (four) hours as needed for pain. 30 tablet 0     albuterol (PROAIR HFA;PROVENTIL HFA;VENTOLIN HFA) 90 mcg/actuation inhaler Inhale 1-2 puffs every 6 (six) hours as needed for wheezing or shortness of breath. 1 Inhaler 11     albuterol (PROVENTIL) 2.5 mg /3 mL (0.083 %) nebulizer solution Take 3 mL (2.5 mg total) by nebulization 4 (four) times a day. 75 mL 0     allopurinol (ZYLOPRIM) 100 MG tablet Take 100 mg by mouth daily. TAKE 1 PILL BY MOUTH EVERY  DAY/ TXHUA HNUB NOJ 1 LUB PAB JC MOB KO TAW VWM       aspirin 81 MG EC tablet Take 81 mg by mouth daily.       atorvastatin (LIPITOR) 40 MG tablet Take 1 tablet (40 mg total) by mouth daily. 90 tablet 4     budesonide-formoterol (SYMBICORT) 80-4.5 mcg/actuation inhaler Inhale 2 puffs 2 (two) times a day. 1 Inhaler 11     cetirizine (ZYRTEC) 5 MG tablet Take 5 mg by mouth daily.       CHOLECALCIFEROL 1,000 unit tablet Take 1,000 Units by mouth daily.        colchicine 0.6 mg tablet Take 0.3 mg by mouth daily.       diclofenac sodium (VOLTAREN) 1 % Gel Apply topically 2 (two) times a day as needed.       diphenhydrAMINE (BENADRYL) 25 mg tablet Take 1 tablet (25 mg total) by mouth 2 (two) times a day as needed (pruritus, rash). 15 tablet 0     dorzolamide-timolol (COSOPT) 22.3-6.8 mg/mL ophthalmic solution Administer 1 drop to the right eye 2 (two) times a day.        fluticasone (FLONASE) 50 mcg/actuation nasal spray One spray in each nostril daily. 16 g 11     gabapentin (NEURONTIN) 100 MG capsule TAKE 1 PILL BY MOUTH EVERY DAY AT BEDTIME/TXHUA HMO THAUM MUS PW NOJ 1 LUB PAB QHOV MOB LEEG 30 capsule 0     GLUCERNA SHAKE Liqd DRINK 1 CAN BY MOUTH 3 TIMES A DAY FOR MALNUTRITION/ TXHUA HNUB HAUS 1 POOM 3 ZAUG 36764 mL 11     levothyroxine (SYNTHROID, LEVOTHROID) 50 MCG tablet Take 50 mcg by mouth Daily at 6:00 am. TAKE 1 PILL BY MOUTH EVERY DAY /TXHUA HNUB NOJ 1 LUB TSHUAJ PAB JC THYROID       lisinopril (PRINIVIL,ZESTRIL) 10 MG tablet Take 10 mg by mouth daily.       miscellaneous medical supply (BLOOD PRESSURE CUFF) Misc Check blood pressure daily as directed. 1 each 0     montelukast (SINGULAIR) 10 mg tablet Take 1 tablet (10 mg total) by mouth at bedtime. 30 tablet 11     nitroglycerin (NITROSTAT) 0.4 MG SL tablet Place 0.4 mg under the tongue every 5 (five) minutes as needed for chest pain.       polyvinyl alcohol (LIQUIFILM TEARS) 1.4 % ophthalmic solution Administer 1 drop to both eyes as needed for dry eyes.        prednisoLONE acetate (PRED-FORTE) 1 % ophthalmic suspension Administer 1 drop to the right eye 3 (three) times a day.       tamsulosin (FLOMAX) 0.4 mg Cp24 Take 0.4 mg by mouth daily.       tenofovir alafenamide (VEMLIDY) 25 mg Tab tablet Take 25 mg by mouth daily with breakfast. Take with food       tiotropium (SPIRIVA) 18 mcg inhalation capsule Place 18 mcg into inhaler and inhale daily. INHALE THE CONTENTS OF 1 CAPSULE DAILY/ TXHUA HNUB SIV LUB NQUS PA NQUS 1 LUB TSHUAJ       traMADol (ULTRAM) 50 mg tablet Take 50 mg by mouth every 6 (six) hours as needed for pain.       No current facility-administered medications for this visit.        Allergies:  Bee pollen    Review or Systems:  Constitution: normal  HEENT: normal  Pulmonary: normal  Cardiovascular: normal  GI: normal  : normal  Musculoskeletal: normal  Neuro: normal  Endocrine: normal  Psych: normal  Lymph/Heme: normal    OBJECTIVE:  Vitals:    09/13/17 1508   BP: 122/50   Pulse:    Resp:    SpO2:      General Appearance:    Alert, cooperative, no distress, appears stated age   Head:    Normocephalic, without obvious abnormality, atraumatic   Eyes:    PERRL, conjunctiva/corneas clear, EOM's intact   Nose:   Mucosa moist, normal    Throat:   Lips, mucosa, and tongue normal; ora phaynx clear   Neck:   Supple, symmetrical, trachea midline, no adenopathy;     thyroid:  no enlargement/tenderness/nodules; no carotid    bruit or JVD   Lungs:    Bilateral expiratory wheezes, metric breath sounds    Heart:    Regular rate and rhythm, S1 and S2 normal, no murmur, rub   or gallop   Abdomen:     Soft, non-tender, bowel sounds active all four quadrants,     no masses, no organomegaly   Extremities:   Extremities normal, atraumatic, no cyanosis or edema       Skin:   Skin color, texture, turgor normal, no rashes or lesions, lipoma left upper lumbar back   Neurologic:   No focal deficits     Chest x-ray is personally reviewed and is clear with no sign of pneumothorax  or infiltrate    Labs:  Results for orders placed or performed in visit on 09/13/17   Glycosylated Hemoglobin A1c   Result Value Ref Range    Hemoglobin A1c 6.2 (H) 3.5 - 6.0 %   Potassium is pending  Recent EKG with sinus bradycardia with a rate of 43, nonspecific ST changes.  ASSESSMENT/PLAN:  1. Preop examination  Potassium   2. Hospital discharge follow-up     3. Low back pain     4. Type 2 diabetes mellitus with diabetic nephropathy, without long-term current use of insulin  Glycosylated Hemoglobin A1c   5. Atherosclerosis of native coronary artery of native heart without angina pectoris  ALT (SGPT)    LDL Cholesterol, Direct   6. Flank pain  XR Chest PA and Lateral     83-year-old presents for preop cataract surgery.  Acceptable risk  Surgery is low cardiac risk, patient intermediate cardiac risk.  Okay for surgery without any change  Consider preop bronchodilator.  No other special recommendations  Chandan Shea MD
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
## 2021-06-14 NOTE — PROGRESS NOTES
Patient outreach follow up:  11-17-17: Call pt. Spoke with Kathleen, spouse. Asked to speak with patient.     Spouse stated; patient is currently at his eye appt this morning. Audi, PCA/son bring him to his appt.   Introduced self to spouse and that care guide is calling from the patient's pcp clinic.  Spouse is informed to remind the patient that he have a upcoming appt schedule on 12-01-17 at 3:00 PM with Dr. Shea.       Spouse shared:  -He is doing good.   -Audi still the pca.   -still walking inside the house and stretch his legs and elevate arms. Summer time, he like to walk outside the house or around the block with one person assist.   -patient appetite, eating, and sleep is normal. Does not have any concern at this time.     Advised spouse, pca, family member, and patient to contact care guide at phone number [phone] for any resource need, appt, or questions or concern.   Spouse confirmed and will remind the pt for his upcoming appt with pcp. Spouse have no questions.     Note:  Per patient chart reviewed; patient have been enroll to [facility]) service since January 06, 2015.   This is over 2 years. Patient is due for RE-PCAM (RN) Assessment per [facility] standard.     Plan:  At next outreach follow up: discuss RE-PCAM (RN) Assessment appointment with patient.

## 2021-06-15 NOTE — PROGRESS NOTES
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     Obstructive lung disease:  Moderate obstructive lung disease with FEV1 1.01 L (63% predicted) and corrected DLCO 50% predicted.  There is likely a component of COPD from prior biofuel smoke exposure in [facility], in addition to an asthmatic or at least bronchodilator-reversible component.  Overall dyspnea is improved, though cold air leads to wheezing in cough so the last week has been difficult.  Uses his nebulized albuterol BID and that helps.  Walks with a wheeled walker around the house for exercise.  Nasal pruritus is improved with nasal fluticasone.     Plan:  - continue budesonide-formoterol 80-4.5 mcg two inhalations BID; rinse/gargle/spit water after use  - continue tiotropium HandiHaler one inhalation daily  - continue montelukast 10 mg qHS  - continue nasal fluticasone one spray in each nostril daily  - albuterol HFA or nebulized as needed  - received his seasonal influenza vaccine  - up to date on pneumococcal vaccination  - follow up in 6 months or sooner as needed  - encouraged him or his son to call any time with questions or concerning symptoms     I appreciate the opportunity to participate in the care of Mr. Odell.  Please feel free to contact me at any time.     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 Moderate obstructive lung disease with FEV1 1.01 L (63% predicted) and corrected DLCO 50% predicted.  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  Overall dyspnea is improved, though cold air leads to wheezing in cough so the last week has been difficult.  Uses his nebulized albuterol BID and that helps.  Walks with a wheeled walker around the house for exercise.  Nasal pruritus is improved with nasal fluticasone.    ROS:  A 12-system review was obtained and was negative with the exception of the symptoms endorsed in the history of present illness.    PMH:  Thrombocytopenia  Stroke with some residual right hemiparesis by report  H/O sepsis  CAD  Hypothyroidism  HTN  Dyslipidemia  Hepatitis B  Cirrhosis  Recently diagnosed early-stage hepatic lesion, presumed hepatocellular carcinoma  Gout  GERD  DM2  CKD  Cervicalgia  Cholelithiasis s/p lap julius  Chronic right apical fibroatelectasis  H/O right PTX  H/O arm DVT    PSH:  Past Surgical History:   Procedure Laterality Date     ABDOMINAL SURGERY       CATARACT EXTRACTION Right     per pt report     ENTROPIAN REPAIR       EYE SURGERY       LAPAROSCOPIC CHOLECYSTECTOMY N/A 8/24/2015    Procedure: CHOLECYSTECTOMY LAPAROSCOPIC, LYSIS OF ADHESIONS;  Surgeon: Kushal Lilly MD;  Location: [facility];  Service:      FL CORNEAL TRANSPLANT,LAMELLAR      Description: Cornea Transplant;  Recorded: 12/04/2013;     FL CORNEAL TRANSPLANT,LAMELLAR      Description: Cornea Transplant;  Recorded: 12/04/2013;       Allergies:  Allergies   Allergen Reactions     Bee Pollen Anaphylaxis     Anaphylactic reaction 8/2/17 after multiple bee stings       Family HX:  Family History   Problem Relation Age of Onset     Diabetes Mother      Diabetes Brother       Diabetes Sister      Diabetes Sister        Social Hx:  Social History     Social History     Marital status:      Spouse name: N/A     Number of children: N/A     Years of education: N/A     Occupational History     Not on file.     Social History Main Topics     Smoking status: Never Smoker     Smokeless tobacco: Never Used     Alcohol use No     Drug use: No     Sexual activity: Not on file     Other Topics Concern     Not on file     Social History Narrative       Current Meds:  Current Outpatient Prescriptions   Medication Sig Dispense Refill     acetaminophen (TYLENOL) 500 MG tablet Take 1 tablet (500 mg total) by mouth every 4 (four) hours as needed for pain. 30 tablet 0     acetaminophen-codeine (TYLENOL #3) 300-30 mg per tablet Take 1-2 tablets by mouth every 4 (four) hours as needed for pain. 30 tablet 0     albuterol (PROAIR HFA;PROVENTIL HFA;VENTOLIN HFA) 90 mcg/actuation inhaler Inhale 1-2 puffs every 6 (six) hours as needed for wheezing or shortness of breath. 1 Inhaler 11     albuterol (PROVENTIL) 2.5 mg /3 mL (0.083 %) nebulizer solution Take 3 mL (2.5 mg total) by nebulization every 4 (four) hours as needed for wheezing or shortness of breath. 180 mL 11     allopurinol (ZYLOPRIM) 100 MG tablet Take 100 mg by mouth daily. TAKE 1 PILL BY MOUTH EVERY DAY/ TXMANISHAA LAUREEN NOJ 1 LUB PAB JC MOB KO TAW VWM       aspirin 81 MG EC tablet Take 81 mg by mouth daily.       aspirin 81 MG EC tablet TAKE 1 PILL (81 MG TOTAL) BY MOUTH EVERY DAY/ TXMANISHAA CHRISTOPHERUB NOJ 1 LUB TSHUAJ PAB [facility]. 90 tablet 3     atorvastatin (LIPITOR) 40 MG tablet Take 1 tablet (40 mg total) by mouth daily. 90 tablet 4     budesonide-formoterol (SYMBICORT) 80-4.5 mcg/actuation inhaler Inhale 2 puffs 2 (two) times a day. 1 Inhaler 11     cetirizine (ZYRTEC) 5 MG tablet Take 5 mg by mouth daily.       CHOLECALCIFEROL 1,000 unit tablet Take 1,000 Units by mouth daily.        colchicine 0.6 mg tablet Take 0.3 mg by mouth daily.        diclofenac sodium (VOLTAREN) 1 % Gel Apply topically 2 (two) times a day as needed.       diclofenac sodium (VOLTAREN) 1 % Gel Apply sparingly to painful spot bid prn 100 g 0     diphenhydrAMINE (BENADRYL) 25 mg tablet Take 1 tablet (25 mg total) by mouth 2 (two) times a day as needed (pruritus, rash). 15 tablet 0     dorzolamide-timolol (COSOPT) 22.3-6.8 mg/mL ophthalmic solution Administer 1 drop to the right eye 2 (two) times a day.        ergocalciferol (ERGOCALCIFEROL) 50,000 unit capsule Take 1 capsule (50,000 Units total) by mouth once a week for 12 doses. 12 capsule 0     fluticasone (FLONASE) 50 mcg/actuation nasal spray One spray in each nostril daily. 16 g 11     gabapentin (NEURONTIN) 100 MG capsule TAKE 1 PILL BY MOUTH EVERY DAY AT BEDTIME/TXHUA HMO THAUM MUS PW NOJ 1 LUB PAB QHOV MOB LEEG 30 capsule 0     GLUCERNA SHAKE Liqd DRINK 1 CAN BY MOUTH 3 TIMES A DAY FOR MALNUTRITION/ TXHUA HNUB HAUS 1 POOM 3 ZAUG 96518 mL 11     levothyroxine (SYNTHROID, LEVOTHROID) 50 MCG tablet Take 50 mcg by mouth Daily at 6:00 am. TAKE 1 PILL BY MOUTH EVERY DAY /TXHUA HNUB NOJ 1 LUB TSHUAJ PAB JC THYROID       levothyroxine (SYNTHROID, LEVOTHROID) 50 MCG tablet TAKE 1 PILL BY MOUTH EVERY DAY /TXHUA HNUB NOJ 1 LUB TSHUAJ PAB JC THYROID 30 tablet 11     lisinopril (PRINIVIL,ZESTRIL) 10 MG tablet Take 10 mg by mouth daily.       miscellaneous medical supply (BLOOD PRESSURE CUFF) Misc Check blood pressure daily as directed. 1 each 0     montelukast (SINGULAIR) 10 mg tablet Take 1 tablet (10 mg total) by mouth at bedtime. 30 tablet 11     nitroglycerin (NITROSTAT) 0.4 MG SL tablet Place 0.4 mg under the tongue every 5 (five) minutes as needed for chest pain.       polyvinyl alcohol (LIQUIFILM TEARS) 1.4 % ophthalmic solution Administer 1 drop to both eyes as needed for dry eyes.       prednisoLONE acetate (PRED-FORTE) 1 % ophthalmic suspension Administer 1 drop to the right eye 3 (three) times a day.       SPIRIVA WITH  HANDIHALER 18 mcg inhalation capsule INHALE THE CONTENTS OF 1 CAPSULE DAILY/ TXHUA HNUB SIV LUB NQUS PA NQUS 1 LUB TSHUAJ 90 capsule 1     tamsulosin (FLOMAX) 0.4 mg Cp24 Take 0.4 mg by mouth daily.       tenofovir alafenamide (VEMLIDY) 25 mg Tab tablet Take 25 mg by mouth daily with breakfast. Take with food       tiotropium (SPIRIVA) 18 mcg inhalation capsule Place 18 mcg into inhaler and inhale daily. INHALE THE CONTENTS OF 1 CAPSULE DAILY/ TXHUA HNUB SIV LUB NQUS PA NQUS 1 LUB TSHUAJ       traMADol (ULTRAM) 50 mg tablet Take 50 mg by mouth every 6 (six) hours as needed for pain.       No current facility-administered medications for this visit.        Physical Exam:  /70 Comment: Patient taken blood pressure medications  Pulse (!) 56  Resp 20  Wt 111 lb 8 oz (50.6 kg)  SpO2 97% Comment: RA  BMI 23.3 kg/m2  Gen: alert, no distress  HEENT: nasal turbinates are unremarkable, no oropharyngeal lesions, no cervical or supraclavicular lymphadenopathy  CV: RRR, no M/G/R  Resp: CTAB, no focal crackles or wheezes  Abd: soft, nontender, no palpable organomegaly  Skin: no apparent rashes  Ext: no cyanosis, clubbing or edema  Neuro: alert, nonfocal    Labs:  reviewed    Imaging studies:  Pharmacologic MPI (8/17/17):  - no evidence of ischemia  - small fixed apical defect  - EF 52%     TTE (August 2016)  - EF 55%  - no valve disease     CT chest (4/20/17):  - directly reviewed  - RUL band-like fibroatelectasis  - RLL ground glass infiltrate c/w pneumonitis     CXR (6/23/17):  - right basilar opacity improved since April  - right apical scarring     Fluoro swallow study (January 2016):  - no aspiration     PFT's (7/5/17):  FEV1/FVC is 0.48 and is reduced.  FEV1 is 1.01 L (63% predicted) and is reduced.  FVC is 2.09 L (104% predicted) and normal.  There was improvement in spirometry after a single inhaled dose of bronchodilator.  The patient was unable to maintain a pant frequency or consistency to reliably complete  plethysmography.  DLCO is 50% predicted and is reduced when it is corrected for hemoglobin.    Marcell Palacios MD  Pulmonary and Critical Care Medicine  [facility]  Cell [phone]  Office [phone]  Pager [phone]

## 2021-06-15 NOTE — PROGRESS NOTES
Per pt chart reviewed, pt has enrolled to [facility] ([facility]) Service on 05/16/2013 with former CG named Wolfgang CALDERON, and Jeff KOVACS.    Patient is due for RE-PCAM with [facility] nurse per [facility] policy/standard.     Patient outreach follow up:  8:06 AM: Call patient and no answer.   8:11 AM: Call pt again and Kathleen, pt's spouse answer the phone. Asked to speak with pt. Spoke with pt and spouse. Patient gave verbal permission to speak with spouse.     Checked with patient for most/urgent needs and tophi on toe/gout from visit on 12/1/2017. Questioned if patient have any pain today. Updated goal and named of .   Patient shared: tophi on toes from gout has been decreased. No pain. Appetite and sleep is normal. Has no concern.   Kathleen, spouse shared: he has not complains of pain. Audi is the pca person and son. My  get 5 1/2 hour pca service daily; Monday to Sunday. Virginie Gonzales is no longer the .  changed to Sybil Mai. Sybil Odell will do home visit once in every 6 months; two times per year calendar. Doesn't know Sybil's phone number. She is from the same office as Virginie Gonzales.     Discussed RE-PCAM with pt and spouse. Explained RE-PCAM.    Patient and spouse disagreed to RE-PCAM at this time. Pt and spouse complaints of the cold weather. Pt does not want to go outside. Pt and spouse request to RE-PCAM sometime in April to May 2018.   Encouraged pt to continued f/u with pcp as recommended and RE-PCAM appt with [facility] nurse. CG phone number is given to Kathleen for any questions.   Pt and spouse confirmed no questions.

## 2021-06-16 NOTE — PROGRESS NOTES
Preoperative Exam    Scheduled Procedure: Medical Imaging  Surgery Date:  2/20/2018  Surgery Location: [facility], Fax: [phone]    Surgeon:  Dr. Verma    Assessment/Plan:     1. Preop examination  INR    HM2(CBC w/o Differential)    Basic Metabolic Panel    Electrocardiogram Perform - Clinic   2. History of COPD     3. Type 2 diabetes mellitus with diabetic nephropathy, without long-term current use of insulin  Glycosylated Hemoglobin A1c   4. Hypothyroidism due to acquired atrophy of thyroid     5. HCC (hepatocellular carcinoma)     6. Elevated AFP  INR    HM2(CBC w/o Differential)    Basic Metabolic Panel   7. Gout tophi       COPD stable with inhalers    Diabetes mellitus diet controlled    Hypothyroid compensated    Chronic gout unchanged    Elevated AFP with previously treated hepatocellular carcinoma, plan for the bone marrow biopsy.      Surgical Procedure Risk: Low (reported cardiac risk generally < 1%)  Have you had prior anesthesia?: Yes  Have you or any family members had a previous anesthesia reaction:  No  Do you or any family members have a history of a clotting or bleeding disorder?: No      Patient approved for surgery with general or local anesthesia.  Preferred local anesthesia with sedation.  Patient has COPD he has been controlled, O2 sat was 92% he continues on multiple inhalers and nebs.        Functional Status: Dependent: spouse and family  Patient plans to recover at home with family.     Subjective:      Evelio Odell is a 83 y.o. male who presents for a preoperative consultation.  This 83-year-old male has a history of hepatocellular carcinoma he had microwave ablation for a liver lesion back in 2017.    Unfortunately he has had some increasing elevation of AFP now up to 2221.    Recommendation was to get a bone marrow biopsy from oncology.    Patient does have COPD he had a CT scan of his lung on 1/12/2018 which showed chronic bronchiectasis he has some atelectasis and  scarring no infiltrates were noted.  He also has a 4.1 cm ascending thoracic aorta.    He denies any new symptoms of shortness of breath or productive cough or fever.    10 point review of systems reviewed and are negative, other than those listed in the HPI.    Pertinent History  Do you have difficulty breathing or chest pain after walking up a flight of stairs: No  History of obstructive sleep apnea: No  Steroid use in the last 6 months: No  Frequent Aspirin/NSAID use: unsure  Prior Blood Transfusion: No  Prior Blood Transfusion Reaction: No  If for some reason prior to, during or after the procedure, if it is medically indicated, would you be willing to have a blood transfusion?:  There is no transfusion refusal.    Current Outpatient Prescriptions   Medication Sig Dispense Refill     acetaminophen (TYLENOL) 500 MG tablet Take 1 tablet (500 mg total) by mouth every 4 (four) hours as needed for pain. 30 tablet 0     acetaminophen-codeine (TYLENOL #3) 300-30 mg per tablet Take 1-2 tablets by mouth every 4 (four) hours as needed for pain. 30 tablet 0     albuterol (PROAIR HFA;PROVENTIL HFA;VENTOLIN HFA) 90 mcg/actuation inhaler Inhale 1-2 puffs every 6 (six) hours as needed for wheezing or shortness of breath. 1 Inhaler 11     albuterol (PROVENTIL) 2.5 mg /3 mL (0.083 %) nebulizer solution Take 3 mL (2.5 mg total) by nebulization every 4 (four) hours as needed for wheezing or shortness of breath. 180 mL 11     allopurinol (ZYLOPRIM) 100 MG tablet Take 100 mg by mouth daily. TAKE 1 PILL BY MOUTH EVERY DAY/ TXHUA HNUB NOJ 1 LUB PAB JC MOB KO TAW VWM       aspirin 81 MG EC tablet Take 81 mg by mouth daily.       aspirin 81 MG EC tablet TAKE 1 PILL (81 MG TOTAL) BY MOUTH EVERY DAY/ TXHUA HNUB NOJ 1 LUB TSHUAJ PAB NTSHAV KHIAV ZOO. 90 tablet 3     atorvastatin (LIPITOR) 40 MG tablet Take 1 tablet (40 mg total) by mouth daily. 90 tablet 4     budesonide-formoterol (SYMBICORT) 80-4.5 mcg/actuation inhaler Inhale 2 puffs 2  (two) times a day. 1 Inhaler 11     cetirizine (ZYRTEC) 5 MG tablet Take 5 mg by mouth daily.       CHOLECALCIFEROL 1,000 unit tablet Take 1,000 Units by mouth daily.        colchicine 0.6 mg tablet Take 0.3 mg by mouth daily.       diclofenac sodium (VOLTAREN) 1 % Gel Apply topically 2 (two) times a day as needed.       diclofenac sodium (VOLTAREN) 1 % Gel Apply sparingly to painful spot bid prn 100 g 0     diphenhydrAMINE (BENADRYL) 25 mg tablet Take 1 tablet (25 mg total) by mouth 2 (two) times a day as needed (pruritus, rash). 15 tablet 0     dorzolamide-timolol (COSOPT) 22.3-6.8 mg/mL ophthalmic solution Administer 1 drop to the right eye 2 (two) times a day.        ergocalciferol (ERGOCALCIFEROL) 50,000 unit capsule Take 1 capsule (50,000 Units total) by mouth once a week for 12 doses. 12 capsule 0     fluticasone (FLONASE) 50 mcg/actuation nasal spray One spray in each nostril daily. 16 g 11     gabapentin (NEURONTIN) 100 MG capsule TAKE 1 PILL BY MOUTH EVERY DAY AT BEDTIME/TXHUA HMO THAUM MUS PW NOJ 1 LUB PAB QHOV MOB LEEG 30 capsule 0     GLUCERNA SHAKE Liqd DRINK 1 CAN BY MOUTH 3 TIMES A DAY FOR MALNUTRITION/ TXHUA HNUB HAUS 1 POOM 3 ZAUG 42916 mL 11     levothyroxine (SYNTHROID, LEVOTHROID) 50 MCG tablet Take 50 mcg by mouth Daily at 6:00 am. TAKE 1 PILL BY MOUTH EVERY DAY /TXHUA HNUB NOJ 1 LUB TSHUAJ PAB JC THYROID       levothyroxine (SYNTHROID, LEVOTHROID) 50 MCG tablet TAKE 1 PILL BY MOUTH EVERY DAY /TXHUA HNUB NOJ 1 LUB TSHUAJ PAB JC THYROID 30 tablet 11     lisinopril (PRINIVIL,ZESTRIL) 10 MG tablet Take 10 mg by mouth daily.       miscellaneous medical supply (BLOOD PRESSURE CUFF) Misc Check blood pressure daily as directed. 1 each 0     montelukast (SINGULAIR) 10 mg tablet Take 1 tablet (10 mg total) by mouth at bedtime. 30 tablet 11     nitroglycerin (NITROSTAT) 0.4 MG SL tablet Place 0.4 mg under the tongue every 5 (five) minutes as needed for chest pain.       polyvinyl alcohol (LIQUIFILM  TEARS) 1.4 % ophthalmic solution Administer 1 drop to both eyes as needed for dry eyes.       prednisoLONE acetate (PRED-FORTE) 1 % ophthalmic suspension Administer 1 drop to the right eye 3 (three) times a day.       SPIRIVA WITH HANDIHALER 18 mcg inhalation capsule INHALE THE CONTENTS OF 1 CAPSULE DAILY/ TXHUA HNUB SIV LUB NQUS PA NQUS 1 LUB TSHUAJ 90 capsule 1     tamsulosin (FLOMAX) 0.4 mg Cp24 Take 0.4 mg by mouth daily.       tenofovir alafenamide (VEMLIDY) 25 mg Tab tablet Take 25 mg by mouth daily with breakfast. Take with food       tiotropium (SPIRIVA) 18 mcg inhalation capsule Place 18 mcg into inhaler and inhale daily. INHALE THE CONTENTS OF 1 CAPSULE DAILY/ TXHUA HNUB SIV LUB NQUS PA NQUS 1 LUB TSHUAJ       traMADol (ULTRAM) 50 mg tablet Take 50 mg by mouth every 6 (six) hours as needed for pain.       No current facility-administered medications for this visit.         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 and albumin creatinine ratio <30 mg/g     Hypothyroidism     Type 2 diabetes mellitus with diabetic nephropathy, without long-term current use of insulin     Anemia     Cervicalgia     Coronary atherosclerosis of unspecified type of vessel, native or graft     Gout     Acute cholecystitis     Urinary retention     Cerebral vascular accident     Pulmonary fibrosis     Depression     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     Controlled substance agreement signed     Pneumonia     TWYLA (acute kidney injury)     Other specified hypotension     CAP (community acquired pneumonia)     Leukocytosis, unspecified type     Mild intermittent asthma without  complication     Advanced directives, counseling/discussion     Spontaneous pneumothorax     Chest pain on breathing     Fever in other diseases     Hyponatremia     Nodule on liver     Anaphylactic reaction, initial encounter     Gastroesophageal reflux disease without esophagitis     Gastroparesis     HCC (hepatocellular carcinoma)     Hypothyroidism due to acquired atrophy of thyroid       Past Medical History:   Diagnosis Date     Acute renal failure      Acute tubular necrosis      Anemia      Asthma      Atherosclerosis of native coronary artery with angina pectoris      Cerebral vascular accident      Cervicalgia      Cholecystitis      Cholelithiasis      Chronic kidney disease      Chronic obstructive pulmonary disease      Coronary artery disease      Coronary artery stenosis      Diabetes      Diabetes      Diabetes mellitus, type II      Dysphagia      GERD (gastroesophageal reflux disease)      Gout      HCC (hepatocellular carcinoma) 8/2/2017     Hepatic cirrhosis      Hepatitis B      High cholesterol      History of pneumonia      Hyperlipidemia      Hypertension      Hypothyroidism      Leukocytosis      MI (myocardial infarction)      Obstructive sleep apnea, adult      Painful swelling of joint      Palpitations      Sepsis      SOB (shortness of breath)      Stroke      Thrombocytopenia        Past Surgical History:   Procedure Laterality Date     ABDOMINAL SURGERY       CATARACT EXTRACTION Right     per pt report     ENTROPIAN REPAIR       EYE SURGERY       LAPAROSCOPIC CHOLECYSTECTOMY N/A 8/24/2015    Procedure: CHOLECYSTECTOMY LAPAROSCOPIC, LYSIS OF ADHESIONS;  Surgeon: Kushal Lilly MD;  Location: [facility];  Service:      DC CORNEAL TRANSPLANT,LAMELLAR      Description: Cornea Transplant;  Recorded: 12/04/2013;     DC CORNEAL TRANSPLANT,LAMELLAR      Description: Cornea Transplant;  Recorded: 12/04/2013;     Microwave ablation of hepatocellular carcinoma, 2017    No history of  anesthesia or bleeding problems    No family history for anesthesia or bleeding problems.      Social History     Social History     Marital status:      Spouse name: N/A     Number of children: N/A     Years of education: N/A     Occupational History     Not on file.     Social History Main Topics     Smoking status: Never Smoker     Smokeless tobacco: Never Used     Alcohol use No     Drug use: No     Sexual activity: Not on file     Other Topics Concern     Not on file     Social History Narrative             Objective:     Vitals:    02/14/18 1619   BP: 128/60   Pulse: 62   Resp: 16   Temp: 97.8  F (36.6  C)   SpO2: 92%         Physical Exam:  General appearance no acute distress.  He is afebrile    HEENT neck without adenopathy oropharynx is clear    Status post surgery right eye for cornea and intraocular lens.    Heart was regular rate in the 60s EKG please see below.    Lungs had few scattered expiratory wheezes O2 sat 92%.  Air exchange was adequate.    Abdomen soft bowel sounds normal no guarding or rebound    Extremities without edema he does have some tophus change through the right knee which is chronic.  No active warmth or redness or drainage.    Skin otherwise normal.      EKG: EKG personally reviewed and agree with reading below.    CT scan of the chest on 1/12/2018 as outlined above, with bronchiectasis and chronic atelectasis and scarring no active infiltrate.  4.1 cm ascending thoracic aorta.  No masses    Labs: Additional labs BMP pending and will be faxed  Recent Results (from the past 24 hour(s))   INR    Collection Time: 02/14/18  4:56 PM   Result Value Ref Range    INR 1.00 0.90 - 1.10   HM2(CBC w/o Differential)    Collection Time: 02/14/18  4:56 PM   Result Value Ref Range    WBC 9.5 4.0 - 11.0 thou/uL    RBC 5.04 4.40 - 6.20 mill/uL    Hemoglobin 15.7 14.0 - 18.0 g/dL    Hematocrit 47.6 40.0 - 54.0 %    MCV 95 80 - 100 fL    MCH 31.1 27.0 - 34.0 pg    MCHC 32.9 32.0 - 36.0 g/dL     RDW 11.9 11.0 - 14.5 %    Platelets 206 140 - 440 thou/uL    MPV 7.4 7.0 - 10.0 fL   Glycosylated Hemoglobin A1c    Collection Time: 02/14/18  4:56 PM   Result Value Ref Range    Hemoglobin A1c 6.3 (H) 3.5 - 6.0 %   Electrocardiogram Perform - Clinic    Collection Time: 02/14/18  5:00 PM   Result Value Ref Range    SYSTOLIC BLOOD PRESSURE  mmHg    DIASTOLIC BLOOD PRESSURE  mmHg    VENTRICULAR RATE 62 BPM    ATRIAL RATE 62 BPM    P-R INTERVAL 152 ms    QRS DURATION 94 ms    Q-T INTERVAL 398 ms    QTC CALCULATION (BEZET) 403 ms    P Axis 68 degrees    R AXIS 78 degrees    T AXIS 99 degrees    MUSE DIAGNOSIS       Normal sinus rhythm  Normal ECG  When compared with ECG of 10-AUG-2017 09:36,  No significant change was found         Immunization History   Administered Date(s) Administered     Influenza Q0d4-23, 11/21/2009     Influenza high dose, seasonal 09/03/2015, 09/12/2016, 12/01/2017     Influenza, inj, historic,unspecified 09/05/2014     Influenza, seasonal,quad inj 6-35 mos 11/10/2010, 09/13/2013     Pneumo Conj 13-V (2010&after) 11/20/2015     Pneumo Polysac 23-V 12/30/2000, 04/14/2012     Td,adult,historic,unspecified 10/13/2009           Electronically signed by Kushal Obrien MD 02/14/18 4:23 PM

## 2021-06-17 NOTE — PROGRESS NOTES
Patient outreach:    Care Guide called patient two times today.  If this patient is returning my call, please transfer to [phone].    Per pt chart reviewed; pt is seen by the [facility] ED today, 5/2/18.    Upcoming appt reviewed: as of 6/2/18 at 6:03PM, per appt desk; pt have no upcoming appt within [facility].     Plan:   Next outreach due in two weeks.

## 2021-06-17 NOTE — PROGRESS NOTES
"Assessment/ Plan  Problem List Items Addressed This Visit        Unprioritized    Gouty tophus     Markable tophus, left great toe, draining spontaneously, red and painful, possibly superinfected.    Agree with antibiotic usage  Considered opening this further up and packing but elected against this, partly since it is possible that this is simply a sterile/nonseptic process.    Continue cephalexin for 7 days, add prednisone, follow-up 1 week.  See instructions below         Cellulitis - Primary     Improving after incision and drainage of tophus and 24 hours of antibiotics.  Redness on dorsum of the foot is significantly improved.  Continue/complete antibiotics             Patient Instructions   I think that a gout tophus became infected.  Most of the white stuff is related to gout and not truly pus.  Add prednisone in addition to cephalexin antibiotic  Hot pack 3-4 times per day  Squeeze out \"pus\" gently if possible  See me back Tuesday or Wednesday      Subjective  CC:  Chief Complaint   Patient presents with     Follow-up     ER visit, gout      HPI:  ER Follow-up  Date of ER visit: Yesterday  [facility]  Chief Diagnosis: Pain and drainage left MTP joint, history of gout, concerned about infection  Narrative: Patient presented with a few days of foot pain on the top of his foot near the great toe with some drainage.  Associated with some chills  Noted to have extension of erythema across the dorsum of the foot.  Felt to have cellulitis, abscess and tophaceous gout of lower extremity.  Abscess was incised and drained, given a dose of sort of IV antibiotics and then placed on Keflex  Radiology studies: X-ray of MTP joint suggested chronic gout changes, no fracture  Labs: White blood cell count was elevated at 14.5, creatinine was 1.4, CRP was elevated at 4.3 but with similar one year ago, sed rate was normal.  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 and albumin creatinine ratio <30 mg/g     Hypothyroidism     Type 2 diabetes mellitus with diabetic nephropathy, without long-term current use of insulin     Anemia     Cervicalgia     Coronary atherosclerosis of unspecified type of vessel, native or graft     Gout     Acute cholecystitis     Urinary retention     Cerebral vascular accident     Pulmonary fibrosis     Depression     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     Controlled substance agreement signed     Pneumonia     TWYLA (acute kidney injury)     Other specified hypotension     CAP (community acquired pneumonia)     Leukocytosis, unspecified type     Mild intermittent asthma without complication     Advanced directives, counseling/discussion     Spontaneous pneumothorax     Chest pain on breathing     Fever in other diseases     Hyponatremia     Nodule on liver     Anaphylactic reaction, initial encounter     Gastroesophageal reflux disease without esophagitis     Gastroparesis     HCC (hepatocellular carcinoma)     Hypothyroidism due to acquired atrophy of thyroid     Gouty tophus     Cellulitis     Current medications reviewed as follows:  Current Outpatient Prescriptions on File Prior to Visit   Medication Sig     acetaminophen (TYLENOL) 500 MG tablet Take 1 tablet (500 mg total) by mouth every 4 (four) hours as needed for pain.     acetaminophen-codeine (TYLENOL #3) 300-30 mg per tablet Take 1-2 tablets by mouth every 4 (four) hours as needed for pain.     albuterol (PROAIR HFA;PROVENTIL HFA;VENTOLIN HFA) 90 mcg/actuation inhaler Inhale 1-2 puffs every 6 (six) hours as needed for wheezing or shortness of breath.     albuterol  (PROVENTIL) 2.5 mg /3 mL (0.083 %) nebulizer solution Take 3 mL (2.5 mg total) by nebulization every 4 (four) hours as needed for wheezing or shortness of breath.     allopurinol (ZYLOPRIM) 100 MG tablet Take 100 mg by mouth daily. TAKE 1 PILL BY MOUTH EVERY DAY/ TXHUA HNUB NOJ 1 LUB PAB JC MOB KO TAW VWM     aspirin 81 MG EC tablet Take 81 mg by mouth daily.     aspirin 81 MG EC tablet TAKE 1 PILL (81 MG TOTAL) BY MOUTH EVERY DAY/ TXHUA HNUB NOJ 1 LUB TSHUAJ PAB NTSHAV KHIAV ZOO.     atorvastatin (LIPITOR) 40 MG tablet Take 1 tablet (40 mg total) by mouth daily.     budesonide-formoterol (SYMBICORT) 80-4.5 mcg/actuation inhaler Inhale 2 puffs 2 (two) times a day.     cetirizine (ZYRTEC) 5 MG tablet Take 5 mg by mouth daily.     CHOLECALCIFEROL 1,000 unit tablet Take 1,000 Units by mouth daily.      clindamycin (CLEOCIN) 150 MG capsule Take 2 capsules (300 mg total) by mouth 4 (four) times a day for 7 days.     colchicine 0.6 mg tablet Take 0.3 mg by mouth daily.     diclofenac sodium (VOLTAREN) 1 % Gel Apply topically 2 (two) times a day as needed.     diclofenac sodium (VOLTAREN) 1 % Gel Apply sparingly to painful spot bid prn     diphenhydrAMINE (BENADRYL) 25 mg tablet Take 1 tablet (25 mg total) by mouth 2 (two) times a day as needed (pruritus, rash).     dorzolamide-timolol (COSOPT) 22.3-6.8 mg/mL ophthalmic solution Administer 1 drop to the right eye 2 (two) times a day.      fluticasone (FLONASE) 50 mcg/actuation nasal spray One spray in each nostril daily.     gabapentin (NEURONTIN) 100 MG capsule TAKE 1 PILL BY MOUTH EVERY DAY AT BEDTIME/TXHUA HMO THAUM MUS PW NOJ 1 LUB PAB QHOV MOB LEEG     GLUCERNA SHAKE Liqd DRINK 1 CAN BY MOUTH 3 TIMES A DAY FOR MALNUTRITION/ TXHUA HNUB HAUS 1 POOM 3 ZAUG     levothyroxine (SYNTHROID, LEVOTHROID) 50 MCG tablet Take 50 mcg by mouth Daily at 6:00 am. TAKE 1 PILL BY MOUTH EVERY DAY /COLTON GARDUNO NOJ 1 LUB TSHUAJ PAB JC THYROID     levothyroxine (SYNTHROID, LEVOTHROID) 50  MCG tablet TAKE 1 PILL BY MOUTH EVERY DAY /TXHUA HNUB NOJ 1 LUB TSHUAJ PAB JC THYROID     lisinopril (PRINIVIL,ZESTRIL) 10 MG tablet Take 10 mg by mouth daily.     miscellaneous medical supply (BLOOD PRESSURE CUFF) Misc Check blood pressure daily as directed.     montelukast (SINGULAIR) 10 mg tablet Take 1 tablet (10 mg total) by mouth at bedtime.     nitroglycerin (NITROSTAT) 0.4 MG SL tablet Place 0.4 mg under the tongue every 5 (five) minutes as needed for chest pain.     polyvinyl alcohol (LIQUIFILM TEARS) 1.4 % ophthalmic solution Administer 1 drop to both eyes as needed for dry eyes.     prednisoLONE acetate (PRED-FORTE) 1 % ophthalmic suspension Administer 1 drop to the right eye 3 (three) times a day.     SPIRIVA WITH HANDIHALER 18 mcg inhalation capsule INHALE THE CONTENTS OF 1 CAPSULE DAILY/ TXHUA HNUB SIV LUB NQUS PA NQUS 1 LUB TSHUAJ     tamsulosin (FLOMAX) 0.4 mg Cp24 Take 0.4 mg by mouth daily.     tenofovir alafenamide (VEMLIDY) 25 mg Tab tablet Take 25 mg by mouth daily with breakfast. Take with food     tiotropium (SPIRIVA) 18 mcg inhalation capsule Place 18 mcg into inhaler and inhale daily. INHALE THE CONTENTS OF 1 CAPSULE DAILY/ TXHUA HNUB SIV LUB NQUS PA NQUS 1 LUB TSHUAJ     traMADol (ULTRAM) 50 mg tablet Take 50 mg by mouth every 6 (six) hours as needed for pain.     No current facility-administered medications on file prior to visit.      History   Smoking Status     Never Smoker   Smokeless Tobacco     Never Used     Social History     Social History Narrative     Patient Care Team:  Chandan Shea MD as PCP - General  [facility]   Endy Mulligan MD as Physician (Cardiology)  Maury Uriarte CNP (Inactive) as Nurse Practitioner (Gastroenterology)  Jane Pan CNP as Nurse Practitioner (Pulmonary Disease)  CHRIS To Mai, CMA as Clinic Care Coordination Care Guide (Clinic Care Coordination)  Audi Odell  Full 10 system review including  constitutional, respiratory, cardiac, gi, urinary, rheumatologic, neurologic, reproductive, dermatologic psychiatric is  performed  and is otherwise negative         Objective  Physical Exam  Vitals:    05/03/18 1112   BP: 164/66   Patient Site: Left Arm   Patient Position: Sitting   Cuff Size: Adult Regular   Pulse: 64   Resp: 24   Temp: 97.6  F (36.4  C)   TempSrc: Oral   Weight: 121 lb 8 oz (55.1 kg)     Body mass index is 25.39 kg/(m^2).  84-year-old, very pleasant elderly man, no distress  Swollen right great toe, 1 cm incision on top, chalky, somewhat yellow tinted tophaceous material is expressed easily from this.  Is painful to the patient.  There is little bit of blood present 2.  Circumference of erythema outlined yesterday on dorsum of the foot and I do not see erythema within these borders.  Doing much better.  Limited swelling.  Diagnostics:   Reviewed labs from the hospital and as noted above, cultures and a      Please note: Voice recognition software was used in this dictation.  It may therefore contain typographical errors.

## 2021-06-17 NOTE — PROGRESS NOTES
Attempt 1: Care Guide called patient.  If this patient is returning my call, please transfer to [phone].    Per pt chart reviewed; no upcoming appt within [facility].

## 2021-06-17 NOTE — PROGRESS NOTES
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  Still painful, large tophus.  Question whether this was infective, cultures came back with MRSA, antibiotic change to doxycycline    So this is improved but he still has an large tophus which is possibly infected.  Could continue to be a chronic problem.  We will have him see orthopedics for opinion           Relevant Orders    Ambulatory referral to Orthopedics    Amb Referral to Medication Management    Ambulatory referral to Orthopedics    Cellulitis     On foot, seed for this was gouty tophus.  As described for that, doxycycline discontinued at this point, doing much better, referral for management of large tophus to orthopedics         Hypertension     Controlled on 10 mg lisinopril, add 5 mg amlodipine         Relevant Medications    amLODIPine (NORVASC) 5 MG tablet    Other Relevant Orders    Amb Referral to Medication Management        Subjective  CC:  Chief Complaint   Patient presents with     Follow-up     Gout wound - states he can walk now and has shoe on today. No more puss coming out.      HPI:  Primary reason patient is here today is to follow-up on draining tophus,    He was seen in  the emergency room 5/2/18 for what is felt to be cellulitis and draining tophus on his left great toe.  This was opened and drained.  I saw him the next day and i felt it possible that this was simply the gout itself causing the inflammation.  Added prednisone to the cephalexin is already on.  Either way, the pain remains but the draining is stopped completed the cephalexin  HTN Follow-up    BP meds lisinopril 10 mg, previously on other antihypertensives  Any problems with meds?  No  BP Readings from Last 3 Encounters:   05/08/18 176/58   05/03/18 164/66   05/02/18 (!) 185/89     BP readings outside of clinic?  No  Narrative/ comments: Reviewed elevated blood pressure last few occasions.  Results for orders placed or performed in visit on 02/14/18   Basic Metabolic Panel   Result Value Ref Range    Sodium 141 136 - 145 mmol/L    Potassium 4.4 3.5 - 5.0 mmol/L    Chloride 109 (H) 98 - 107 mmol/L    CO2 22 22 - 31 mmol/L    Anion Gap, Calculation 10 5 - 18 mmol/L    Glucose 134 (H) 70 - 125 mg/dL    Calcium 9.4 8.5 - 10.5 mg/dL    BUN 35 (H) 8 - 28 mg/dL    Creatinine 1.56 (H) 0.70 - 1.30 mg/dL    GFR MDRD Af Amer 52 (L) >60 mL/min/1.73m2    GFR MDRD Non Af Amer 43 (L) >60 mL/min/1.73m2     ----------------------  AHA 11/2017 goals  Normal less than 120/80  Elevated 120- 129/ <80  Stage I hypertension-130-139/80-89  Stage II hypertension greater than 139/  >89  Treatment goal less than 130/80 all    Patient with history of hepatitis B, cirrhosis and liver tumor.  Reviewed last note from gastroenterology, he is due for follow-up about now.  He should be taking by read but this is not among the medicines he brings in that he is taking every day.  He is not sure whether his medications at home he is not taking.  In the past, I have witnessed his transaminases go up rapidly when he is been off this medication.    Did not address type 2 diabetes today, due for an A1c at some point soon    Did not address hypothyroidism,  due for TSH soon.    At baseline in terms of his COPD.      PFSH:  Patient Active Problem List   Diagnosis     Obstructive Sleep Apnea     Cholelithiasis With Bile Duct Calculi & Acute Cholecystitis     Thrombophlebitis Of Brachial Vein     Viral hepatitis B chronic     Esophageal reflux     History of COPD     CKD stage G3b/A1, GFR 30-44 and albumin creatinine ratio <30 mg/g     Hypothyroidism     Type 2 diabetes mellitus with diabetic nephropathy, without long-term current use of insulin     Anemia     Cervicalgia     Coronary atherosclerosis of unspecified type of vessel, native or graft     Gout     Acute cholecystitis     Urinary retention     Cerebral vascular accident     Pulmonary fibrosis     Depression     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     Controlled substance agreement signed     Pneumonia     TWYLA (acute kidney injury)     Other specified hypotension     CAP (community acquired pneumonia)     Leukocytosis, unspecified type     Mild intermittent asthma without complication     Advanced directives, counseling/discussion     Spontaneous pneumothorax     Chest pain on breathing     Fever in other diseases     Hyponatremia     Nodule on liver     Anaphylactic reaction, initial encounter     Gastroesophageal reflux disease without esophagitis     Gastroparesis     HCC (hepatocellular carcinoma)     Hypothyroidism due to acquired atrophy of thyroid     Gouty tophus     Cellulitis     Hypertension     Current medications reviewed as follows:  Current Outpatient Prescriptions on File Prior to Visit   Medication Sig     acetaminophen (TYLENOL) 500 MG tablet Take 1 tablet (500 mg total) by mouth every 4 (four) hours as needed for pain.     acetaminophen-codeine (TYLENOL #3) 300-30 mg per tablet Take 1-2 tablets by mouth every 4 (four) hours as needed for pain.     albuterol (PROAIR HFA;PROVENTIL HFA;VENTOLIN HFA) 90 mcg/actuation inhaler Inhale 1-2 puffs every 6 (six) hours as needed for wheezing or  shortness of breath.     albuterol (PROVENTIL) 2.5 mg /3 mL (0.083 %) nebulizer solution Take 3 mL (2.5 mg total) by nebulization every 4 (four) hours as needed for wheezing or shortness of breath.     allopurinol (ZYLOPRIM) 100 MG tablet Take 100 mg by mouth daily. TAKE 1 PILL BY MOUTH EVERY DAY/ TXHUA HNUB NOJ 1 LUB PAB JC MOB KO TAW VWM     aspirin 81 MG EC tablet Take 81 mg by mouth daily.     aspirin 81 MG EC tablet TAKE 1 PILL (81 MG TOTAL) BY MOUTH EVERY DAY/ TXHUA HNUB NOJ 1 LUB TSHUAJ PAB NTSHAV KHIAV ZOO.     atorvastatin (LIPITOR) 40 MG tablet Take 1 tablet (40 mg total) by mouth daily.     budesonide-formoterol (SYMBICORT) 80-4.5 mcg/actuation inhaler Inhale 2 puffs 2 (two) times a day.     cetirizine (ZYRTEC) 5 MG tablet Take 5 mg by mouth daily.     CHOLECALCIFEROL 1,000 unit tablet Take 1,000 Units by mouth daily.      clindamycin (CLEOCIN) 150 MG capsule Take 2 capsules (300 mg total) by mouth 4 (four) times a day for 7 days.     colchicine 0.6 mg tablet Take 0.3 mg by mouth daily.     diclofenac sodium (VOLTAREN) 1 % Gel Apply topically 2 (two) times a day as needed.     diclofenac sodium (VOLTAREN) 1 % Gel Apply sparingly to painful spot bid prn     diphenhydrAMINE (BENADRYL) 25 mg tablet Take 1 tablet (25 mg total) by mouth 2 (two) times a day as needed (pruritus, rash).     dorzolamide-timolol (COSOPT) 22.3-6.8 mg/mL ophthalmic solution Administer 1 drop to the right eye 2 (two) times a day.      fluticasone (FLONASE) 50 mcg/actuation nasal spray One spray in each nostril daily.     gabapentin (NEURONTIN) 100 MG capsule TAKE 1 PILL BY MOUTH EVERY DAY AT BEDTIME/TXHUA HMO THAUM MUS PW NOJ 1 LUB PAB QHOV MOB LEEG     GLUCERNA SHAKE Liqd DRINK 1 CAN BY MOUTH 3 TIMES A DAY FOR MALNUTRITION/ TXHUA HNUB HAUS 1 POOM 3 ZAUG     levothyroxine (SYNTHROID, LEVOTHROID) 50 MCG tablet Take 50 mcg by mouth Daily at 6:00 am. TAKE 1 PILL BY MOUTH EVERY DAY /COLTON GARDUNO NOERIN 1 LUB TSHUAJ PAB JC THYROID      levothyroxine (SYNTHROID, LEVOTHROID) 50 MCG tablet TAKE 1 PILL BY MOUTH EVERY DAY /TXHUA HNUB NOJ 1 LUB TSHUAJ PAB JC THYROID     lisinopril (PRINIVIL,ZESTRIL) 10 MG tablet Take 10 mg by mouth daily.     miscellaneous medical supply (BLOOD PRESSURE CUFF) Misc Check blood pressure daily as directed.     montelukast (SINGULAIR) 10 mg tablet Take 1 tablet (10 mg total) by mouth at bedtime.     nitroglycerin (NITROSTAT) 0.4 MG SL tablet Place 0.4 mg under the tongue every 5 (five) minutes as needed for chest pain.     polyvinyl alcohol (LIQUIFILM TEARS) 1.4 % ophthalmic solution Administer 1 drop to both eyes as needed for dry eyes.     prednisoLONE acetate (PRED-FORTE) 1 % ophthalmic suspension Administer 1 drop to the right eye 3 (three) times a day.     predniSONE (DELTASONE) 10 mg tablet Take 5 tablets (50 mg total) by mouth daily for 5 days.     SPIRIVA WITH HANDIHALER 18 mcg inhalation capsule INHALE THE CONTENTS OF 1 CAPSULE DAILY/ TXHUA HNUB SIV LUB NQUS PA NQUS 1 LUB TSHUAJ     tamsulosin (FLOMAX) 0.4 mg Cp24 Take 0.4 mg by mouth daily.     tiotropium (SPIRIVA) 18 mcg inhalation capsule Place 18 mcg into inhaler and inhale daily. INHALE THE CONTENTS OF 1 CAPSULE DAILY/ TXHUA HNUB SIV LUB NQUS PA NQUS 1 LUB TSHUAJ     traMADol (ULTRAM) 50 mg tablet Take 50 mg by mouth every 6 (six) hours as needed for pain.     [DISCONTINUED] tenofovir alafenamide (VEMLIDY) 25 mg Tab tablet Take 25 mg by mouth daily with breakfast. Take with food     No current facility-administered medications on file prior to visit.      History   Smoking Status     Never Smoker   Smokeless Tobacco     Never Used     Social History     Social History Narrative     Patient Care Team:  Chandan Shea MD as PCP - General  [facility]   Endy Mulligan MD as Physician (Cardiology)  Maury Uriarte CNP (Inactive) as Nurse Practitioner (Gastroenterology)  Jane Pan CNP as Nurse Practitioner (Pulmonary Disease)  Sybil Odell  CHRIS Mai Mai, CMA as Clinic Care Coordination Care Guide (Clinic Care Coordination)  Audi Odell  Full 10 system review including constitutional, respiratory, cardiac, gi, urinary, rheumatologic, neurologic, reproductive, dermatologic psychiatric is  performed (via questionnaire) and is negative         Objective  Physical Exam  Vitals:    05/08/18 1352   BP: 176/58   Patient Site: Right Arm   Patient Position: Sitting   Cuff Size: Adult Regular   Pulse: 72   Resp: 20   Temp: 97.8  F (36.6  C)   TempSrc: Oral   Weight: 123 lb (55.8 kg)     Body mass index is 25.71 kg/(m^2).  Patient is alert, oriented, no acute distress,  Left foot is examined, large red gouty tophus, no erythema on the foot.  Horizontal incision remains open, minimal to no drainage.  Diagnostics:   Reviewed recent culture results for MRSA      Please note: Voice recognition software was used in this dictation.  It may therefore contain typographical errors.

## 2021-06-18 NOTE — PROCEDURES
Procedures  SIMULATION NOTE:    DIAGNOSIS:  Stage IV liver cancer with clinically symptomatic left posterior chest wall metastasis.     INDICATION:  Palliative radiation therapy is recommended for patient for local control and symptomatic relief.      CONSENT:  The possible risks and side effects of radiation therapy have been discussed with the patient in detail and at great length.  Questions were answered to patient's satisfaction.  Written consent was obtained.      SIMULATION:  The patient is in the supine position with a wing board and under knee cushion to help keep same position during daily radiation therapy.  Tentative isocenter is set up in the center of the thoracic region.  We will acquire CT information to help us better locate the target and design the radiation therapy field.      BLOCKS:  Custom blocks will be drawn to minimize radiation to normal tissues and to protect normal organs, including, but not limited to, lungs, heart, esophagus, spinal cord, bone and soft tissue.      DOSAGE:  I plan to give him radiation therapy at 300 cGy each fraction to a total of 3000 cGy in 10 treatments targeted to the left chest wall region only.      Diana Fraser MD, PhD  Department of Radiation Oncology   [facility]  Tel: [phone]  Page: [phone]    [facility]  [address] [place] [address]     [place] [id number] [facility]   [place] [address]

## 2021-06-18 NOTE — PROGRESS NOTES
Pt here ambulatory using his wheeled walker, accompanied by his son and his spouse.  used throughout encounter. Pt has pain in the posterior and lateral 5th rib which he says is tolerable with 5mg oxycodone every 4 hours. He says his appetite is poor but digesting okay (regular BMs). States he doesn't urinate much but thinks it's because he doesn't drink enough fluids. I encouraged PO intake. New patient folder given to pt and family. We discussed the routine for RT including consult, simulation, daily treatments (likely 10 total), and tx at [facility]. They verbalized their understanding of educ and were encouraged to call us with questions.

## 2021-06-18 NOTE — PROGRESS NOTES
Patient here today for initial visit with Dr. Cerda for hepatocellular carcinoma.  NINA Landaverde RN    PCP for Routine Care

## 2021-06-18 NOTE — PROCEDURES
Clinical Treatment Planning Note    This is a 84-year-old male with diagnosis of Stage IV liver cancer with clinically symptomatic left chest wall metastases.  The palliative radiation therapy is recommended to the patient. The patient is simulated today in the supine position with head rest, wing board and under knee cushion to help keep the same position during the daily radiation therapy. 2-3 fields will be used to set up radiation therapy fields to include the left posterior chest wall with margin. I plan to give him radiation therapy at 300 cGy each fraction to a total of 3000 cGy in 10 treatments.      Diana Fraser MD, PhD  Department of Radiation Oncology   [facility]  Tel: [phone]  Page: [phone]    [facility]  [address] [place] [address]     [address] [place] [address]

## 2021-06-18 NOTE — CONSULTS
Consults   [facility] Radiation Oncology Consult Note     Patient: Evelio Odell  MRN: [id number]  Date of Service: 06/06/2018          Chandan Shea MD  [address] [place] [address]       Dear Dr. Shea:    Thank you very much for referring this patient for consideration of radiotherapy. As you know Mr. Odell is a 84 y.o. male with a diagnosis of stage IV liver cancer with clinically symptomatic left posterior chest wall metastasis.  Patient is referred to radiation oncology for evaluation and consideration of possible palliative radiation therapy for local control and symptom relief.    HISTORY OF PRESENT ILLNESS:   Mr. Odell is a 84 y.o. male with multiple medical comorbidities including past history of hepatitis B progressed to cirrhosis, diabetes, renal insufficiency and pulmonary disorder.  Patient had a routine hepatoma surveillance with abdominal ultrasound in February 2016 which revealed 1.2 cm right hepatic lobe lesion.  Follow-up MRI liver on March 6, 2017 showed indeterminate 9 mm segment 6 hepatic lesion.  AFP was normal at 3.6 ng/mL.  Repeat AFP in April 2017 was measured 21.9 ng/mL.  Follow-up MRI in May 2017 confirmed 2 cm hepatic lesion.  His case was discussed at liver conference and at the [facility] and the recommendation was to proceed with microwave ablation patient had treatment in August 2017.  He has been followed at [facility] since then.    A CT/PET on January 29, 2018 demonstrated a lytic lesion in the left sacrum with no evidence of liver recurrence. Biopsy of sacrum lesion on February 20, 2018 showed metastatic hepatocellular carcinoma.  The patient presented with over 6 weeks history of posterior left chest wall pain which radiated to the front.  He ranks his pain level at 8/10 scale with pain medication.  Patient is currently taking oxycodone 1-2 pills every 4 hours with only moderate relief.  He also had mild to moderate pain in the left SI joint region  with 3/10 scale.  Restaging CT chest and bone scan showed skeletal metastasis in the left posterior fifth rib with soft tissue component.  His most recent AFP was measured to 2221 on January 5, 2018.    He is now referred to radiation oncology for evaluation and consideration of palliative radiation therapy for local control and symptom relief.     CHEMOTHERAPY HISTORY: Concurrent Chemotherapy: No    RADIATION THERAPY HISTORY: Prior Radiation: No    IMPLANTED CARDIAC DEVICE: none     Current Outpatient Prescriptions   Medication Sig Dispense Refill     albuterol (PROAIR HFA;PROVENTIL HFA;VENTOLIN HFA) 90 mcg/actuation inhaler Inhale 1-2 puffs every 6 (six) hours as needed for wheezing or shortness of breath. 1 Inhaler 11     albuterol (PROVENTIL) 2.5 mg /3 mL (0.083 %) nebulizer solution Take 3 mL (2.5 mg total) by nebulization every 4 (four) hours as needed for wheezing or shortness of breath. 180 mL 11     allopurinol (ZYLOPRIM) 100 MG tablet Take 100 mg by mouth daily. TAKE 1 PILL BY MOUTH EVERY DAY/ JINAA LAUREEN NOJ 1 LUB PAB JC MOB KO TAW VWM       amLODIPine (NORVASC) 5 MG tablet Take 1 tablet (5 mg total) by mouth daily. 30 tablet 3     aspirin 81 MG EC tablet Take 81 mg by mouth daily.       budesonide-formoterol (SYMBICORT) 80-4.5 mcg/actuation inhaler Inhale 2 puffs 2 (two) times a day. 1 Inhaler 11     CHOLECALCIFEROL 1,000 unit tablet Take 1,000 Units by mouth daily.        fluticasone (FLONASE) 50 mcg/actuation nasal spray        furosemide (LASIX) 20 MG tablet Take 1 tablet (20 mg total) by mouth daily. 5 tablet 0     GLUCERNA SHAKE Liqd Take 1 Can by mouth 3 (three) times a day.  11     levothyroxine (SYNTHROID, LEVOTHROID) 50 MCG tablet Take 50 mcg by mouth Daily at 6:00 am. TAKE 1 PILL BY MOUTH EVERY DAY /TXMANISHAA HNUB NOJ 1 LUB TSHUAJ PAB JC THYROID       lisinopril (PRINIVIL,ZESTRIL) 10 MG tablet Take 10 mg by mouth daily.       metoprolol succinate (TOPROL-XL) 25 MG Take 25 mg by mouth daily.        miscellaneous medical supply (BLOOD PRESSURE CUFF) Misc Check blood pressure daily as directed. 1 each 0     montelukast (SINGULAIR) 10 mg tablet Take 1 tablet (10 mg total) by mouth at bedtime. 30 tablet 11     nitroglycerin (NITROSTAT) 0.4 MG SL tablet Place 0.4 mg under the tongue every 5 (five) minutes as needed for chest pain.       oxyCODONE (ROXICODONE) 5 MG immediate release tablet 1-2 tabs po q 4 hours prn 100 tablet 0     oxyCODONE (ROXICODONE) 5 MG immediate release tablet Take 1 tablet (5 mg total) by mouth every 4 (four) hours as needed for pain. 42 tablet 0     polyvinyl alcohol (LIQUIFILM TEARS) 1.4 % ophthalmic solution Administer 1 drop to both eyes as needed for dry eyes.       prednisoLONE acetate (PRED-FORTE) 1 % ophthalmic suspension Administer 1 drop to the right eye 4 (four) times a day.        tenofovir alafenamide (VEMLIDY) 25 mg Tab tablet Take 1 tablet (25 mg total) by mouth daily with breakfast. Take with food 30 tablet 3     tiotropium (SPIRIVA) 18 mcg inhalation capsule Place 18 mcg into inhaler and inhale daily. INHALE THE CONTENTS OF 1 CAPSULE DAILY/ TXHUA HNUB SIV LUB NQUS PA NQUS 1 LUB TSHUAJ       No current facility-administered medications for this visit.      Past Medical History:   Diagnosis Date     Acute on chronic diastolic congestive heart failure      Acute renal failure      Acute tubular necrosis      Anemia      Asthma      Atherosclerosis of native coronary artery with angina pectoris      Cerebral vascular accident      Cervicalgia      Cholecystitis      Cholelithiasis      Chronic kidney disease      Chronic obstructive pulmonary disease      Coronary artery disease      Coronary artery stenosis      Diabetes      Diabetes      Diabetes mellitus, type II      Dysphagia      GERD (gastroesophageal reflux disease)      Gout      HCC (hepatocellular carcinoma) 8/2/2017     Hepatic cirrhosis      Hepatitis B      High cholesterol      History of pneumonia       Hyperlipidemia      Hypertension      Hypothyroidism      Leukocytosis      MI (myocardial infarction)      Obstructive sleep apnea, adult      Painful swelling of joint      Palpitations      Sepsis      SOB (shortness of breath)      Stroke      Thrombocytopenia      Past Surgical History:   Procedure Laterality Date     ABDOMINAL SURGERY       CATARACT EXTRACTION Right     per pt report     ENTROPIAN REPAIR       EYE SURGERY       LAPAROSCOPIC CHOLECYSTECTOMY N/A 8/24/2015    Procedure: CHOLECYSTECTOMY LAPAROSCOPIC, LYSIS OF ADHESIONS;  Surgeon: Kushal Lilly MD;  [place] [facility] [place]  Service:      SC CORNEAL TRANSPLANT,LAMELLAR      Description: Cornea Transplant;  Recorded: 12/04/2013;     SC CORNEAL TRANSPLANT,LAMELLAR      Description: Cornea Transplant;  Recorded: 12/04/2013;     Bee pollen  Family History   Problem Relation Age of Onset     Diabetes Mother      Diabetes Brother      Diabetes Sister      Diabetes Sister      Social History     Social History     Marital status:      Spouse name: N/A     Number of children: N/A     Years of education: N/A     Occupational History     Not on file.     Social History Main Topics     Smoking status: Never Smoker     Smokeless tobacco: Never Used     Alcohol use No     Drug use: No     Sexual activity: Not on file     Other Topics Concern     Not on file     Social History Narrative        Review of Systems:      General  Constitutional (WDL): Exceptions to WDL  Fatigue: Fatigue not relieved by rest - Limiting instrumental ADL  EENT  Eye Disorder (WDL): All eye disorder elements are within defined limits  Ear Disorder (WDL): Exceptions to WDL (hard of hearing)  Respiratory   Respiratory (WDL): Exceptions to WDL  Cough: Mild symptoms, nonprescription intervention indicated  Cardiovascular  Cardiovascular (WDL): All cardiovascular elements are within defined limits  Endocrine     Gastrointestinal  Gastrointestinal (WDL): Exceptions to  WDL  Anorexia: Loss of appetite without alteration in eating habits  Nausea: Loss of appetite without alteration in eating habits  Dehydration: Increased oral fluids indicated, dry mucous membranes, diminished skin turgor  Musculoskeletal  Musculoskeletal and Connetive Tissue Disorders (WDL): Exceptions to WDL  Arthralgia: Moderate pain, limiting instrumental ADL  Bone Pain: Moderate pain, limiting instrumental ADL (left posterior and lateral 5th rib)  Muscle Weakness : Symptomatic, evident on physical exam, limiting instrumental ADL  Myalgia: Mild pain  Integumentary               Integumentary (WDL): All integumentary elements are within defined limits  Neurological  Neurosensory (WDL): Exceptions to WDL  Peripheral Motor Neuropathy: Moderate symptoms, limiting instrumental ADL  Ataxia: Moderate symptoms, limiting instrumental ADL  Psychological/Emotional   Patient Coping: Accepting  Hematological/Lymphatic  Lymph (WDL): All lymph disorder elements are within defined limits  Dermatologic     Genitourinary/Reproductive  Genitourinary (WDL): All genitourinary elements are within defined limits  Reproductive     Pain              Currently in Pain: Yes  Pain Score (Initial OR Reassessment): 4  Pain Frequency: Constant/continuous  Location: left posterior and lateral 5th rib  Pain Intervention(s): Home medication (5mg Oxycodone every 4 hours around the clock)  Response to Interventions: makes pain tolerable  Accompanied by  Accompanied by: Family Member (wife and son)    Imaging: Reviewed    Pathology: Reviewed    ECOG Peformance Status  ECOG Performance Status: 3  Distress Assessment Score: 3      Objective:     PHYSICAL EXAMINATION:    /72  Pulse 65  Wt 118 lb 3.2 oz (53.6 kg)  SpO2 95%  BMI 24.7 kg/m2    Gen: Alert, in NAD  Eyes: PERRL, EOMI, sclera anicteric  Neck: Supple, full ROM, no LAD  Pulm: No wheezing, stridor or respiratory distress  CV: Well-perfused, no cyanosis, no pedal edema  Abdominal: BS+,  soft, nontender, nondistended, no hepatomegaly  Back: No step-offs or pain to palpation along the thoracolumbar spine  Rectal: Deferred  : Deferred  Musculoskeletal: Normal muscle bulk and tone, there is a significant tenderness along the left posterior chest wall at approximately fifth rib consistent with the patient history of metastasis.  There was also a mild tenderness along the left SI joint region.  The pain is localized and not radiate.  Skin: Normal color and turgor  Neurologic: A/Ox3, CN II-XII intact, normal gait and station  Psychiatric: Appropriate mood and affect    Intent of Therapy: Palliative  Side effects that may occur during or within weeks after Radiation Therapy      Fatigue and general weakness     Darkening, irritation, itchiness, redness, dryness and peeling of the skin of the chest    Loss of chest and armpit hair    Painful swallowing limiting solid and liquid intake and causing dehydration    Nausea, vomiting and decrease in appetite    Side effects that may occur months or years after Radiation Therapy       Development of another tumor or cancer    Lung inflammation or fibrosis causing cough, fever, and shortness of breath     Fracture of ribs    Permanent narrowing or obstruction of the esophagus    Fluid compressing the lung (pleural effusion)    Coronary artery blockage causing angina pain or a heart attack    Thickening, telangiectasias (development of spider like blood vessels in the skin) and ulceration of the skin of the chest    Poor healing after a trauma or surgery in the irradiated areas    Nerve damage resulting in loss of strength or sensation    The risks, benefits and alternatives to radiation therapy were outlined with the patient. All questions were answered and a consent was signed.     Impression     Stage IV liver cancer with clinically symptomatic left posterior chest wall metastasis.     Assessment & Plan:     I have personally reviewed his upcoming medical  record today.  I have also reviewed his most recent radiology study including PET CT scan and bone scan.  This is 84-year-old gentleman with diagnosis of stage IV liver cancer with clinically symptomatic chest wall metastasis.  The possible treatment options including systemic therapy, conservative management, and palliative radiation therapy has been discussed with the patient in detail and at the great lengths.  The possible risks and side effects of radiation therapy has also been spent with the patient.  I think the patient is a good candidate for considering a quick course of palliative radiation therapy for his clinically symptomatic bone metastasis.  I think the patient can be potentially benefited by radiation therapy for local control and symptom relief.  Therefore, radiation therapy is offered to the patient and he is waiting to proceed being aware of potential risks and side effects involved.  He is going to return to all clinic later on today for simulation.  I plan to give him radiation therapy at 300 cGy each fraction to a total of 3000 cGy in 10 treatments targeted to the left posterior chest wall only.  The patient wished to health radiation therapy to the left SI joint at this time.  He is radiation therapy is scheduled to start tomorrow.  I will also refer patient to Dr. Theodore, medical oncology for evaluation and discussion of possible systemic therapy.    Again, thank you very much for the referral and allowing me to participate in the care of this patient.  If you have any questions or concerns about this consultation, please do not hesitate to call.  I spent approximately 60 minutes today with the patient and 80% time was used for counseling.      Sincerely,          Diana Fraser MD, PhD  Department of Radiation Oncology   [facility]  Tel: [phone]  Page: [phone]    [facility]  [address] [place] [address]     [id number] [facility] [place] [address]    CC:  Patient Care Team:  Chandan Shea MD as PCP - [facility]   Endy Mulligan MD as Physician (Cardiology)  Maury Uriarte CNP (Inactive) as Nurse Practitioner (Gastroenterology)  Jane Pan CNP as Nurse Practitioner (Pulmonary Disease)  CHRIS To Mai, CMA as Clinic Care Coordination Care Guide (Clinic Care Coordination)  Audi Odell MD as Physician (Radiation Oncology)  Gissel Antunez RN as Oncology Nurse Navigator (Hematology and Oncology)  Jessica Theodore MD as Physician (Hematology and Oncology)

## 2021-06-18 NOTE — PROGRESS NOTES
RADIATION ONCOLOGY WEEKLY TREATMENT VISIT NOTE      Assessment / Impression       1. Hepatocellular carcinoma metastatic to bone (H)       HCC (hepatocellular carcinoma) (H)    Staging form: Liver (Excluding Intrahepatic Bile Ducts), AJCC 7th Edition    - Clinical stage from 2018: Stage IVB (TX, NX, M1) - Signed by Nelida Cerda MD on 6/15/2018   Tolerating radiation therapy well.  All questions and concerns addressed.  Doing better  8/10 fx 2400/3000cGy    Plan:     Continue radiation treatment as prescribed.  Radiation: Site: L fifth rib  Stereotactic Radiosurgery: No  Concurrent Therapy: No  Today's Dose: 2400  Total Dose for Bone: 3000  Today's Fraction/Total Fraction Bone: 810  2 more fx    Subjective:      HPI: Evelio Odell is a 84 y.o. male with    1. Hepatocellular carcinoma metastatic to bone (H)         The following portions of the patient's history were reviewed and updated as appropriate: allergies, current medications, past family history, past medical history, past social history, past surgical history and problem list.    Assessment                  Body Site: Bone Site: L fifth rib  Stereotactic Radiosurgery: No  Concurrent Therapy: No  Today's Dose: 2400  Total Dose for Bone: 3000  Today's Fraction/Total Fraction Bone: 810  Neuropathy - motor: 3: Objective weakness interfering with activities of daily living  Constipation: 0: None  Diarrhea W/O Colostomy: 0: None  Bowel Incontinence: 0: None  Urinary Incontinence: 0: None                                            Sexuality Alteration                 Emotional Alteration Copin: Effective  Comfort Alteration KPS: 50% Requirws considerable assistance and frequent medical care  Fatigue (ONS scale) : 5: Moderate Fatigue  Pain Location: posterior ribs  Pain Intensity. Rate degree of pain ranging from 0 (no pain) to 10 (severe pain) : 5  Pain Description: Ache - Muscular type ache  Pain Intervention: 3: Opiods  Effectiveness of  pain intervention: 2: Pain relieved 50%   Nutrition Alteration Anorexia: 0: None  Nausea: 0: None  Vomitin: None  Pharynx and Esphogaus: 0: No change over baseline  Skin Alteration Skin Sensation: 0: No problem  Skin Reaction: 0: None  AUA Assessment                                  Accompanied by       Objective:     Exam:     Vitals:    18 1225   BP: 160/72   Pulse: 64   Temp: 98.1  F (36.7  C)   TempSrc: Oral   SpO2: 92%   Weight: 115 lb 6.4 oz (52.3 kg)       Wt Readings from Last 8 Encounters:   18 115 lb 6.4 oz (52.3 kg)   18 114 lb 14.4 oz (52.1 kg)   18 116 lb 1.6 oz (52.7 kg)   18 116 lb (52.6 kg)   18 118 lb 3.2 oz (53.6 kg)   18 117 lb (53.1 kg)   18 114 lb 14.4 oz (52.1 kg)   18 123 lb (55.8 kg)       General: Alert and oriented, in no acute distress  Fraser has mild Erythema.    Treatment Summary to Date    Aria chart and setup information reviewed    Albert Carter MD

## 2021-06-18 NOTE — PROGRESS NOTES
Met with Evelio and his family (wife and son Audi) while in radiation oncology after they met with Dr Fraser. Explained my role within cancer care and the things that I can assist with.      I generally provide assistance with psycho social needs including but not limited to, financial assistance, obtaining education materials, transportation assistance, help with meals, community programs, and help finding support or support groups, getting connected with our psychotherapist when needed.  I am also here to help guide you through our system.      Please do not hesitate to contact me if you have any questions or concerns.  [phone]

## 2021-06-18 NOTE — PROGRESS NOTES
Patient outreach follow up:  5/16/18: Writer contact the pt to follow up. Spoke with patient and spouse named Kathleen. Checked with pt how he is doing after last two OV appt in [facility], ED visit on 5/2/18, and most need. Reminded pt for upcoming appointment on 5/22/18 at 1:00PM with Arnel, and 6/8 at 10:30AM with Dr[facility] Cancino at [facility]. Patient complaints of left upper quadrant to left lower quadrant pain (below armpit to hip) for over 10 days. Pain come and go and walk activities make it worsen per pt. Pt declined SOB, chest pain, headache, dizziness, and vision problem. Pt request writer for advise. Refer pt to speak with a triage nurse and he declined. Educated him and spouse how to contact 911 emergency line. Refer pt to seek advise from pcp. Pt agreed. Appt is schedule on 5/22 at 2:00PM with Dr. Shea.   Pt is informed due for RECAM. Patient is not ready at this time due to toe and gout problem. RECAM appt is pending. Verified outreach frequency.     Discussed Health Care Directive (Honoring Choices) packet. Educated him about the packet. Patient is interested in getting the Honoring Choices packet complete. Pt declined mailing packet to him. Prefer given packet to him on 5/22/18.     Spouse shared:   -he is doing a lot better for over one week now.   -appetite and sleep is better.   -drainage from open sore and left great toe and inflammation have improved. Inflammation size reduced. Still have redness around site.   -able to walk a little bit this week. Sit and lay in sofa for most of the time.   -no concerns.     Patient shared:  -Audi, son/pca assisted with med, manage medical appointment, and bring me to my appointment.   -able to walk a little bit more.   -still have pain on left great toe. Pain level, inflammation, and swallowing decreased. Pain level today is 2.   -feel safe at home. Live with family.     Plan:   Give pt the Honoring Choices packet on 5/22/18.

## 2021-06-18 NOTE — PROGRESS NOTES
Assessment/ Plan  A total of over one hour spent today with patient and son, greater than 50% of this counseling  Problem List Items Addressed This Visit        Unprioritized    Chest pain     Left side related to pathologic rib fracture, possibly vertebral metastasis.  See hepatocellular carcinoma discussion         Relevant Medications    oxyCODONE (ROXICODONE) 5 MG immediate release tablet    Other Relevant Orders    Ambulatory referral to Palliative Care    Ambulatory referral to Radiation Oncology    History of hepatocellular carcinoma     Reviewed Luci note from 3/9/18.  Recommended against chemotherapy at this time.  Discussed option of palliative radiation but patient was not having much pain so did not make referral  Discussed palliative care consult    Extensive discussion with patient and son today  Taking oxycodone 5 mg up to 4 pills per day.  This was prescribed during hospitalization for left rib pain.  Medication helps but incompletely and could use more.    Reviewed case with patient's son primarily, patient in the room, translating some to [facility]  Recommend that strategy be comfort centered   The family does not seem ready for hospice at this point  Palliative care consult  Increase prescription for oxycodone to use 1-2 tablets of 5 mg pills every 4 hours as needed.  Follow-up to reassess this in 2 weeks.  Discussed availability of long-acting opiate medication.  Referral for radiation oncology evaluation, possible radiation to left rib which is quite painful  Follow-up with [facility]/ Luci-referral made today         Acute on chronic diastolic congestive heart failure     Diastolic Congestive Heart Failure iswell compensated  Wt Readings from Last 3 Encounters:   05/30/18 117 lb (53.1 kg)   05/23/18 114 lb 14.4 oz (52.1 kg)   05/08/18 123 lb (55.8 kg)     BP Readings from Last 3 Encounters:   05/30/18 120/68   05/23/18 107/69   05/08/18 176/58       Meds:  Antihypertensives:  Metoprolol, furosemide, amlodipine  Diuretic?  Furosemide 20 mg-hospitalist recommended follow-up for assessment whether this should continue.  His weight is come up a couple pounds since discharge.  I think it should  Nitrites/other? 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  Palliative care referral, referral for radiation oncology, discussion of life issues today           Other Visit Diagnoses     Hospital discharge follow-up        Hospitalized for shortness of breath deemed due to CHF based on moderately elevated BNP.  Improved with diuresis        Subjective  CC:  Chief Complaint   Patient presents with     Hospital Visit Follow Up     HPI:    _______Hospital Follow-up  Date of admission: 5/20  Date of discharge: 5/23  Hospital: [facility]  Chief Diagnosis: Congestive heart failure exacerbation  Narrative: Patient admitted for shortness of breath, felt to be in acute CHF exacerbation.  Treated with IV Lasix, conditions improved.  Discharged on furosemide 20 mg p.o. daily, told to follow-up with primary care physician to discern whether ongoing furosemide as needed  Metastatic cancer also noted.  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  Also noted to have fifth rib pathologic nondisplaced fracture.  Sacral lesion was biopsied and proven metastatic  hepatocellular carcinoma.  Procedures During hospital stay not mentioned: Echocardiogram:    Left Ventricle: Normal left ventricular size.The calculated left ventricular ejection fraction is 63%. This represents a normal ejection fraction. Mild concentric hypertrophy noted. E/e' 8 to 15, which is equivocal for estimating LV filling pressures.    Normal right ventricular size and systolic function.    Aortic Valve: The aortic valve is sclerotic without reduced excursion. Moderate aortic regurgitation.    Normal central venous pressure.    When compared to the previous study dated 8/2/2016, no significant change.     Radiology studies: 5/23/2018 11:22 AM     INDICATION: Skeletal metastases with severe chest pain. Evaluate for skeletal metastases in the ribs..  TECHNIQUE: 25.9 mCi technetium-99m MDP were injected intravenously. Anterior and posterior delayed whole body images were obtained with additional lateral images of the skull.  COMPARISON: CT chest 05/20/2018, PET scan 01/29/2018.     FINDINGS: Linear radiotracer uptake associated with a posterior left fifth rib and this corresponds to an area of ostial lysis and soft tissue mass on CT scan consistent with metastasis. Faint focal activity within a lateral right fourth or fifth rib and   a posterior right 12th rib without associated changes in CT scan are suspicious for skeletal metastases. There are 2 focal areas of relatively intense rate of tracer uptake in the left sacral ala and these correspond to osseous metastases demonstrated   on PET scan. There are 1 or 2 less intense foci of radiotracer uptake involving the right acetabulum or right initial tuberosity and these are suspicious for skeletal metastases. There is degenerative type uptake in the shoulders, elbows, and feet.  IMPRESSION:   CONCLUSION:     1.  New T12 lucent focus most suspicious for metastatic disease.     2.  Otherwise, no significant change. No new findings to explain symptoms of  shortness of breath.     3.  Post treatment changes in the liver, not well evaluated on this noncontrast chest CT.     4.  Stable mildly dilated ascending aorta.     5.  Mild pulmonary trunk enlargement; correlate for pulmonary hypertension.        IMPRESSION:   CONCLUSION:  1.  Skeletal metastases including a left posterior fifth rib metastases with an area of pathologic nondisplaced fracture and soft tissue mass on CT scan.  XR CHEST 1 VIEW PORTABLE  5/20/2018 1:04 PM     INDICATION: Cough.  COMPARISON: 01/29/2018 PET/CT.     FINDINGS: Prior right upper lobe volume loss, bronchiectasis and nodular opacity better seen on CT. Stable right apex thickening. Minimal basilar atelectasis/scarring. Otherwise, lungs are clear. No pleural effusion. No pneumothorax. Stable   cardiomediastinal silhouette.          Labs: Admission BNP was 430, most recent creatinine was 1.76, BUN 26   Evelio Odell   Home Medication Instructions BARRY:    Printed on:05/30/18 6635   Medication Information                      albuterol (PROAIR HFA;PROVENTIL HFA;VENTOLIN HFA) 90 mcg/actuation inhaler  Inhale 1-2 puffs every 6 (six) hours as needed for wheezing or shortness of breath.             albuterol (PROVENTIL) 2.5 mg /3 mL (0.083 %) nebulizer solution  Take 3 mL (2.5 mg total) by nebulization every 4 (four) hours as needed for wheezing or shortness of breath.             allopurinol (ZYLOPRIM) 100 MG tablet  Take 100 mg by mouth daily. TAKE 1 PILL BY MOUTH EVERY DAY/ TXHUA HNUB NOJ 1 LUB PAB JC MOB KO TAW VWM             amLODIPine (NORVASC) 5 MG tablet  Take 1 tablet (5 mg total) by mouth daily.             aspirin 81 MG EC tablet  Take 81 mg by mouth daily.             budesonide-formoterol (SYMBICORT) 80-4.5 mcg/actuation inhaler  Inhale 2 puffs 2 (two) times a day.             CHOLECALCIFEROL 1,000 unit tablet  Take 1,000 Units by mouth daily.              furosemide (LASIX) 20 MG tablet  Take 1 tablet (20 mg total) by mouth daily.              levothyroxine (SYNTHROID, LEVOTHROID) 50 MCG tablet  Take 50 mcg by mouth Daily at 6:00 am. TAKE 1 PILL BY MOUTH EVERY DAY /TXHUA HNUB NOJ 1 LUB TSHUAJ PAB JC THYROID             lisinopril (PRINIVIL,ZESTRIL) 10 MG tablet  Take 10 mg by mouth daily.             metoprolol succinate (TOPROL-XL) 25 MG  Take 25 mg by mouth daily.             miscellaneous medical supply (BLOOD PRESSURE CUFF) Misc  Check blood pressure daily as directed.             montelukast (SINGULAIR) 10 mg tablet  Take 1 tablet (10 mg total) by mouth at bedtime.             nitroglycerin (NITROSTAT) 0.4 MG SL tablet  Place 0.4 mg under the tongue every 5 (five) minutes as needed for chest pain.             oxyCODONE (ROXICODONE) 5 MG immediate release tablet  Take 1 tablet (5 mg total) by mouth every 4 (four) hours as needed for pain.             polyvinyl alcohol (LIQUIFILM TEARS) 1.4 % ophthalmic solution  Administer 1 drop to both eyes as needed for dry eyes.             prednisoLONE acetate (PRED-FORTE) 1 % ophthalmic suspension  Administer 1 drop to the right eye 4 (four) times a day.              tenofovir alafenamide (VEMLIDY) 25 mg Tab tablet  Take 1 tablet (25 mg total) by mouth daily with breakfast. Take with food             tiotropium (SPIRIVA) 18 mcg inhalation capsule  Place 18 mcg into inhaler and inhale daily. INHALE THE CONTENTS OF 1 CAPSULE DAILY/ TXHUA HNUB SIV LUB NQUS PA NQUS 1 LUB TSHUAJ               Follow-up recommended by discharging doctor: Recommend discussion of care for metastatic cancer with oncologist, discharged on oral Lasix 20 mg, follow up with PCP 1 week to determine whether he should have ongoing furosemide.  Also BMP recommended    Follow-up CHF  Wt Readings from Last 3 Encounters:   05/30/18 117 lb (53.1 kg)   05/23/18 114 lb 14.4 oz (52.1 kg)   05/08/18 123 lb (55.8 kg)   Taking furosemide 20 mg p.o. daily which is a new medication for him.  Also on beta-blocker, metoprolol 25, lisinopril 10  mg_________________________  PFSH:  Patient Active Problem List   Diagnosis     Obstructive sleep apnea     Cholelithiasis With Bile Duct Calculi & Acute Cholecystitis     Thrombophlebitis Of Brachial Vein     Viral hepatitis B chronic     Esophageal reflux     History of COPD     CKD stage G3b/A1, GFR 30-44 and albumin creatinine ratio <30 mg/g     Acquired hypothyroidism     Type 2 diabetes mellitus with diabetic nephropathy, without long-term current use of insulin     Anemia     Cervicalgia     Coronary atherosclerosis of unspecified type of vessel, native or graft     Gout     Acute cholecystitis     Urinary retention     Cerebral vascular accident     Pulmonary fibrosis     Depression     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     Controlled substance agreement signed     Pneumonia     TWYLA (acute kidney injury)     Other specified hypotension     CAP (community acquired pneumonia)     Leukocytosis, unspecified type     Mild intermittent asthma without complication     Advanced directives, counseling/discussion     Spontaneous pneumothorax     Chest pain on breathing     Fever in other diseases     Hyponatremia     Nodule on liver     Anaphylactic reaction, initial encounter     Gastroesophageal reflux disease without esophagitis     Gastroparesis     HCC (hepatocellular carcinoma)     Hypothyroidism due to acquired atrophy of thyroid     Gouty tophus     Cellulitis     Hypertension     Chest pain     Acute heart failure, unspecified heart failure type     Bradycardia     History of hepatocellular carcinoma     Acute on chronic diastolic congestive heart failure     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     albuterol (PROVENTIL) 2.5 mg /3 mL (0.083 %) nebulizer solution Take 3 mL (2.5 mg total) by nebulization every 4 (four) hours as needed for wheezing or shortness of breath.     allopurinol (ZYLOPRIM) 100 MG tablet Take 100 mg by mouth daily. TAKE 1 PILL BY MOUTH EVERY DAY/ TXHUA HNUB NOJ 1 LUB PAB JC MOB KO TAW VWM     amLODIPine (NORVASC) 5 MG tablet Take 1 tablet (5 mg total) by mouth daily.     aspirin 81 MG EC tablet Take 81 mg by mouth daily.     budesonide-formoterol (SYMBICORT) 80-4.5 mcg/actuation inhaler Inhale 2 puffs 2 (two) times a day.     CHOLECALCIFEROL 1,000 unit tablet Take 1,000 Units by mouth daily.      levothyroxine (SYNTHROID, LEVOTHROID) 50 MCG tablet Take 50 mcg by mouth Daily at 6:00 am. TAKE 1 PILL BY MOUTH EVERY DAY /TXMANISHAA HNUB NOJ 1 LUB TSHUAJ PAB JC THYROID     lisinopril (PRINIVIL,ZESTRIL) 10 MG tablet Take 10 mg by mouth daily.     metoprolol succinate (TOPROL-XL) 25 MG Take 25 mg by mouth daily.     miscellaneous medical supply (BLOOD PRESSURE CUFF) Misc Check blood pressure daily as directed.     montelukast (SINGULAIR) 10 mg tablet Take 1 tablet (10 mg total) by mouth at bedtime.     nitroglycerin (NITROSTAT) 0.4 MG SL tablet Place 0.4 mg under the tongue every 5 (five) minutes as needed for chest pain.     polyvinyl alcohol (LIQUIFILM TEARS) 1.4 % ophthalmic solution Administer 1 drop to both eyes as needed for dry eyes.     prednisoLONE acetate (PRED-FORTE) 1 % ophthalmic suspension Administer 1 drop to the right eye 4 (four) times a day.      tenofovir alafenamide (VEMLIDY) 25 mg Tab tablet Take 1 tablet (25 mg total) by mouth daily with breakfast. Take with food     tiotropium (SPIRIVA) 18 mcg inhalation capsule Place 18 mcg into inhaler and inhale daily. INHALE THE CONTENTS OF 1 CAPSULE DAILY/ TXHUA HNUB SIV LUB NQUS PA NQUS 1 LUB TSHUAJ     [DISCONTINUED] furosemide (LASIX) 20 MG tablet Take 1 tablet (20 mg total) by mouth daily.      [DISCONTINUED] oxyCODONE (ROXICODONE) 5 MG immediate release tablet Take 1 tablet (5 mg total) by mouth every 4 (four) hours as needed for pain.     No current facility-administered medications on file prior to visit.      History   Smoking Status     Never Smoker   Smokeless Tobacco     Never Used     Social History     Social History Narrative     Patient Care Team:  Chandan Shea MD as PCP - [facility]   Endy Mulligan MD as Physician (Cardiology)  Maury Uriarte CNP (Inactive) as Nurse Practitioner (Gastroenterology)  Jane Pan CNP as Nurse Practitioner (Pulmonary Disease)  CHRIS To Mai, CMA as Clinic Care Coordination Care Guide (Clinic Care Coordination)  Audi Odell  Full 10 system review including constitutional, respiratory, cardiac, gi, urinary, rheumatologic, neurologic, reproductive, dermatologic psychiatric is  performed (via questionnaire) and is negative except chest pain, dizziness, shortness of breath        Objective  Physical Exam  Vitals:    05/30/18 0822   BP: 120/68   Patient Site: Right Arm   Patient Position: Sitting   Cuff Size: Adult Small   Pulse: 60   Temp: 98  F (36.7  C)   TempSrc: Oral   Weight: 117 lb (53.1 kg)     Body mass index is 24.45 kg/(m^2).  Gen- alert, oriented  No acute distress  HEENT- normal cephalic, atraumatic.   Chest- Normal inspiration and expiration.    Clear to ascultation.    No chest wall deformity or scar.  No reproducible chest wall tenderness  CV- Heart regular rate and rhythm  normal tones, no murmurs   No gallops or rubs.  Upper abdomen is not tender on palpation  Ext-no cyanosis   No edema  Skin- warm and dry,   no visualized rash    Diagnostics:   Pending      Please note: Voice recognition software was used in this dictation.  It may therefore contain typographical errors.

## 2021-06-18 NOTE — CONSULTS
[facility] Consult Note    Patient: Evelio Odell  MRN: [id number]  Date of Service: 06/13/2018      Reason for Visit    Referred by Dr. Fraser regarding hepatocellular carcinoma metastasis to bone    Assessment/Plan    ECOG Performance    ECOG Performance Status: 3  Distress Assessment  Distress Assessment Score: Unable to rate    #.  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  Cirrhosis of liver-hepatitis B related.  Compensated.  Child Black - class B.  #.  Chronic hepatitis B infection, treated  #.  CKD stage III     I have extensive medical records available to review.  He is currently undergoing palliative radiation to the painful bone lesion at 5th rib under Dr. Fraser.  Apart from his rib pain, his overall pain control is fairly good.   They would like to review the benefits and risk of chemotherapy.    I reviewed that treatment goal in metastatic setting will be palliative and not cure.  I explained to them that we will need to revisit the staging and updating the blood work prior to formulating the treatment with chemotherapy, in a proper way. I discussed about using sorafenib for palliative chemotherapy as a standard first-line treatment for metastatic hepatocellular carcinoma.  Sorafenib has been approved to be used in child Black class A and B with performance status up to 2.  In addition, the clinical trial included patient with median age of 65 +/-11 years.  He is clearly outside the range for his age and his performance status currently.  I discussed about the dose and schedule of sorafenib which is oral pill to take daily.  He will need to have the dose adjusted because of his renal function.  I reviewed the side effects including fatigue, skin toxicity, potential cardiotoxicity.  The potential benefit is improving overall survival of additional 2-3 months compared to supportive care alone in the right clinical setting.  I explained to them that the risks of systemic treatment  would likely outweigh the benefits in his case, in my opinion, at this point based on his performance status and borderline liver and kidney function.  However we will need to revisit if he desires to proceed with that.     I also mentioned to them about symptom focus care under hospice.  I strongly encouraged him to meet with them for informational meeting after he finishes his radiation.  He is interested in that.  I explained to them to try to get all information, so he can make a decision depending on his goals of life.  They voiced understanding.  At the end of our meeting, they do not have any further question and all of their questions were answered to their stated satisfaction.    Plan:  -Referral to hospice for informational meeting.  -To complete at palliative radiation to the rib lesion.  -Follow-up with me as needed.    TT: 85 minutes and more than 50% was spent on coordination and counseling of care.    Problem List    1. HCC (hepatocellular carcinoma) (H)  Ambulatory referral to Hospice     ______________________________________________________________________________    Staging History    HCC (hepatocellular carcinoma) (H)    Staging form: Liver (Excluding Intrahepatic Bile Ducts), AJCC 7th Edition    - Clinical stage from 2/20/2018: Stage IVB (TX, NX, M1) - Signed by Nelida Cerda MD on 6/15/2018    History    Mr. Evelio Odell is a very pleasant 84-year-old gentleman accompanied by her son.  He has underlying history of chronic hepatitis B and he was previously treated for that.  He was found to have a small nodule on the screening liver ultrasound in February 2016 with normal AFP.  Follow-up MRI showed indeterminate lesion measuring 9 mm.  It was noted to have a rise in AFP in April 2017, liver MRI was repeated in May 2017 which showed 2 cm lesion in the right lobe of the liver.  He underwent microwave ablation in the segment 6 of the liver on 8/30/2017.  Follow-up scan in September 2017,  December 2017 did not show any evidence of recurrence.    He underwent a PET/CT scan in January 2018 due to elevated AFP of 2221.9 and it showed lytic lesion in the left sacrum.  He underwent a biopsy on 2/20/2018 and it confirmed metastatic hepatocellular carcinoma.    His nuclear medicine bone scan on 5/22/2018 showed skeletal metastases involving the lateral right 4th, right 5th, posterior right 11th rib, right acetabulum ans a new T12 focus.    He was followed by Dr. Bernard Verma, from [place] [facility] and last visit was in March 2018.    Past History    Past Medical History:   Diagnosis Date     Acute on chronic diastolic congestive heart failure (H)      Acute renal failure (H)      Acute tubular necrosis (H)      Anemia      Asthma      Atherosclerosis of native coronary artery with angina pectoris (H)      Cerebral vascular accident (H)      Cervicalgia      Cholecystitis      Cholelithiasis      Chronic kidney disease      Chronic obstructive pulmonary disease (H)      Coronary artery disease      Coronary artery stenosis      Diabetes (H)      Diabetes (H)      Diabetes mellitus, type II (H)      Dysphagia      GERD (gastroesophageal reflux disease)      Gout      HCC (hepatocellular carcinoma) (H) 8/2/2017     Hepatic cirrhosis (H)      Hepatitis B      High cholesterol      History of pneumonia      Hyperlipidemia      Hypertension      Hypothyroidism      Leukocytosis      MI (myocardial infarction)      Obstructive sleep apnea, adult      Painful swelling of joint      Palpitations      Sepsis (H)      SOB (shortness of breath)      Stroke (H)      Thrombocytopenia (H)     Family History   Problem Relation Age of Onset     Diabetes Mother      Diabetes Brother      Diabetes Sister      Diabetes Sister       Past Surgical History:   Procedure Laterality Date     ABDOMINAL SURGERY       CATARACT EXTRACTION Right     per pt report     ENTROPIAN REPAIR       EYE SURGERY       LAPAROSCOPIC  "CHOLECYSTECTOMY N/A 8/24/2015    Procedure: CHOLECYSTECTOMY LAPAROSCOPIC, LYSIS OF ADHESIONS;  Surgeon: Kushal Lilly MD;  Location: [facility];  Service:      AK CORNEAL TRANSPLANT,LAMELLAR      Description: Cornea Transplant;  Recorded: 12/04/2013;     AK CORNEAL TRANSPLANT,LAMELLAR      Description: Cornea Transplant;  Recorded: 12/04/2013;    Social History     Social History     Marital status:      Spouse name: N/A     Number of children: N/A     Years of education: N/A     Occupational History     Not on file.     Social History Main Topics     Smoking status: Never Smoker     Smokeless tobacco: Never Used     Alcohol use No     Drug use: No     Sexual activity: No     Other Topics Concern     Not on file     Social History Narrative        Allergies    Allergies   Allergen Reactions     Bee Pollen Anaphylaxis     Anaphylactic reaction 8/2/17 after multiple bee stings       Review of Systems  He has lost about 2 pounds in a week.  He has ongoing fatigue.  He feels his heart is heavy.  He has blurry vision and itchy eyes.  Shortness of breath is all the time.  He has productive cough with greenish phlegm.  Swallowing is okay but he has intermittent vomiting for unclear reason.   His appetite is okay.  He is able to walk mostly without assistance.  He does not do much at home.  He denies any abdominal pain.  No nausea or vomiting.  The remainder of the comprehensive review of system was negative.  Pain  Currently in Pain: Yes  Pain Score (Initial OR Reassessment): Unable to Assess  Pain Frequency: Constant/continuous  Location: left scapula  Pain Characteristics : Pressure  Pain Intervention(s): Home medication  Response to Interventions: improved    Physical Exam    Recent Vitals 6/13/2018   Height 4' 10\"   Weight 114 lbs 14 oz   BSA (m2) 1.46 m2   /76   Pulse 54   Temp 98.2   Temp src 1   SpO2 81   Some recent data might be hidden     General: alert, awake, not in acute distress.  Thin " male.  He walks to the clinic without assistance.  HEENT: Head: Normal, normocephalic, atraumatic.  Eye: Normal external eye, conjunctiva, lids cornea, CHACHA.  Ears: Normal auditory canals and external ears. Non-tender.  Nose: Normal external nose, mucus membranes and septum.  Pharynx:  Normal buccal mucosa. Normal pharynx.  Neck / Thyroid: Supple, no masses, nodes, nodules or enlargement.  Lymphatics: No abnormally enlarged lymph nodes.  Chest: Normal chest wall and respirations. Clear to auscultation.  Heart: S1 S2 RRR, no murmur.   Abdomen: abdomen is soft without significant tenderness, masses, organomegaly or guarding.  No ascites.  Extremities: normal strength, tone, and muscle mass  Skin: normal. no rash or abnormalities  CNS: non focal.    Lab Results    No results found for this or any previous visit (from the past 168 hour(s)).    Imaging Results    Xr Chest 1 View Portable    Result Date: 5/20/2018  XR CHEST 1 VIEW PORTABLE 5/20/2018 1:04 PM INDICATION: Cough. COMPARISON: 01/29/2018 PET/CT. FINDINGS: Prior right upper lobe volume loss, bronchiectasis and nodular opacity better seen on CT. Stable right apex thickening. Minimal basilar atelectasis/scarring. Otherwise, lungs are clear. No pleural effusion. No pneumothorax. Stable cardiomediastinal silhouette.     Ct Chest Without Contrast    Result Date: 5/20/2018  CT CHEST WO CONTRAST 5/20/2018 1:54 PM INDICATION: Shortness of breath. TECHNIQUE: Routine chest. Dose reduction techniques were used. IV CONTRAST: None. COMPARISON: Chest radiograph same day. PET/CT 01/29/2018. FINDINGS: LUNGS AND PLEURA: Retained airway secretions. Right upper lobe volume loss, bronchiectasis and thickening shows no significant change. Stable bronchiectasis right lower lobe. Stable emphysema. Few stable tiny nodules. No consolidation. Negative pleural spaces. MEDIASTINUM: Stable mildly dilated ascending aorta at 4.2 cm. Pulmonary trunk enlargement at 3.3 cm. No new or enlarging  adenopathy. Calcified nodes present. Atherosclerosis. LIMITED UPPER ABDOMEN: Soft tissue organs are compromised in evaluation without IV contrast. Post treatment change in the right liver. Cirrhotic liver morphology. MUSCULOSKELETAL: New ovoid 10 mm lucency in the anterior and superior T12 vertebral body (axial series 2, image 53).     CONCLUSION: 1.  New T12 lucent focus most suspicious for metastatic disease. 2.  Otherwise, no significant change. No new findings to explain symptoms of shortness of breath. 3.  Post treatment changes in the liver, not well evaluated on this noncontrast chest CT. 4.  Stable mildly dilated ascending aorta. 5.  Mild pulmonary trunk enlargement; correlate for pulmonary hypertension. NOTE: ABNORMAL REPORT THE DICTATION ABOVE DESCRIBES AN ABNORMALITY FOR WHICH FOLLOW-UP IS NEEDED.     Nm Bone Scan Whole Body    Result Date: 5/23/2018  NM BONE SCAN WHOLE BODY 5/23/2018 11:22 AM INDICATION: Skeletal metastases with severe chest pain. Evaluate for skeletal metastases in the ribs.. TECHNIQUE: 25.9 mCi technetium-99m MDP were injected intravenously. Anterior and posterior delayed whole body images were obtained with additional lateral images of the skull. COMPARISON: CT chest 05/20/2018, PET scan 01/29/2018. FINDINGS: Linear radiotracer uptake associated with a posterior left fifth rib and this corresponds to an area of ostial lysis and soft tissue mass on CT scan consistent with metastasis. Faint focal activity within a lateral right fourth or fifth rib and  a posterior right 12th rib without associated changes in CT scan are suspicious for skeletal metastases. There are 2 focal areas of relatively intense rate of tracer uptake in the left sacral ala and these correspond to osseous metastases demonstrated on PET scan. There are 1 or 2 less intense foci of radiotracer uptake involving the right acetabulum or right initial tuberosity and these are suspicious for skeletal metastases. There is  degenerative type uptake in the shoulders, elbows, and feet.     CONCLUSION: 1.  Skeletal metastases including a left posterior fifth rib metastases with an area of pathologic nondisplaced fracture and soft tissue mass on CT scan.    Us Venous Legs Bilateral    Result Date: 5/20/2018  US VENOUS LEGS BILATERAL 5/20/2018 3:43 PM INDICATION: R/o dvt TECHNIQUE: Routine exam with compression, augmentation, and duplex utilizing 2D gray-scale imaging, Doppler interrogation with color-flow and spectral waveform analysis. COMPARISON: None. FINDINGS: The common femoral, femoral, popliteal, and segmentally visualized calf veins were evaluated. Right leg veins are negative for deep venous thrombosis. Left leg veins are negative for deep venous thrombosis. No popliteal cysts.     CONCLUSION: 1.  Bilateral leg veins are negative for DVT.        Signed by: Nelida Cerda MD

## 2021-06-18 NOTE — PROGRESS NOTES
[facility]   Outpatient Follow-up evaluation. Fatigue    Current Outpatient Prescriptions:      albuterol (PROVENTIL) 2.5 mg /3 mL (0.083 %) nebulizer solution, Take 3 mL (2.5 mg total) by nebulization every 4 (four) hours as needed for wheezing or shortness of breath., Disp: 180 mL, Rfl: 11     allopurinol (ZYLOPRIM) 100 MG tablet, Take 100 mg by mouth daily. TAKE 1 PILL BY MOUTH EVERY DAY/ TXA HNUB NOJ 1 LUB PAB JC MOB KO TAW VWM, Disp: , Rfl:      amLODIPine (NORVASC) 5 MG tablet, Take 1 tablet (5 mg total) by mouth daily., Disp: 30 tablet, Rfl: 3     aspirin 81 MG EC tablet, Take 81 mg by mouth daily., Disp: , Rfl:      budesonide-formoterol (SYMBICORT) 80-4.5 mcg/actuation inhaler, Inhale 2 puffs 2 (two) times a day., Disp: 1 Inhaler, Rfl: 11     CHOLECALCIFEROL 1,000 unit tablet, Take 1,000 Units by mouth daily. , Disp: , Rfl:      fluticasone (FLONASE) 50 mcg/actuation nasal spray, , Disp: , Rfl:      furosemide (LASIX) 20 MG tablet, Take 1 tablet (20 mg total) by mouth daily., Disp: 5 tablet, Rfl: 0     GLUCERNA SHAKE Liqd, Take 1 Can by mouth 3 (three) times a day., Disp: , Rfl: 11     levothyroxine (SYNTHROID, LEVOTHROID) 50 MCG tablet, Take 50 mcg by mouth Daily at 6:00 am. TAKE 1 PILL BY MOUTH EVERY DAY /[facility] TSHUAJ PAB JC THYROID, Disp: , Rfl:      lisinopril (PRINIVIL,ZESTRIL) 10 MG tablet, Take 10 mg by mouth daily., Disp: , Rfl:      metoprolol succinate (TOPROL-XL) 25 MG, Take 25 mg by mouth daily., Disp: , Rfl:      miscellaneous medical supply (BLOOD PRESSURE CUFF) Misc, Check blood pressure daily as directed., Disp: 1 each, Rfl: 0     montelukast (SINGULAIR) 10 mg tablet, Take 1 tablet (10 mg total) by mouth at bedtime., Disp: 30 tablet, Rfl: 11     nitroglycerin (NITROSTAT) 0.4 MG SL tablet, Place 0.4 mg under the tongue every 5 (five) minutes as needed for chest pain., Disp: , Rfl:      oxyCODONE (ROXICODONE) 5 MG immediate release tablet, 1-2 tabs po q 4  hours prn, Disp: 100 tablet, Rfl: 0     oxyCODONE (ROXICODONE) 5 MG immediate release tablet, Take 1 tablet (5 mg total) by mouth every 4 (four) hours as needed for pain., Disp: 42 tablet, Rfl: 0     polyvinyl alcohol (LIQUIFILM TEARS) 1.4 % ophthalmic solution, Administer 1 drop to both eyes as needed for dry eyes., Disp: , Rfl:      prednisoLONE acetate (PRED-FORTE) 1 % ophthalmic suspension, Administer 1 drop to the right eye 4 (four) times a day. , Disp: , Rfl:      tenofovir alafenamide (VEMLIDY) 25 mg Tab tablet, Take 1 tablet (25 mg total) by mouth daily with breakfast. Take with food, Disp: 30 tablet, Rfl: 3     tiotropium (SPIRIVA) 18 mcg inhalation capsule, Place 18 mcg into inhaler and inhale daily. INHALE THE CONTENTS OF 1 CAPSULE DAILY/ TXHUA HNUB SIV LUB NQUS PA NQUS 1 LUB TSHUAJ, Disp: , Rfl:      albuterol (PROAIR HFA;PROVENTIL HFA;VENTOLIN HFA) 90 mcg/actuation inhaler, Inhale 1-2 puffs every 6 (six) hours as needed for wheezing or shortness of breath., Disp: 1 Inhaler, Rfl: 11        Evelio Odell is a 84 y.o. Male    Chief Complaint   Patient presents with     Follow-up       Diagnoses:(See Problem list)     sinus bradycardia mild.  History of coronary disease without active ischemia.  History of diastolic LV impairment without active manifestation of heart failure      Recommendations:    For now continue with current medical therapy and observation no changes recommended at this time      Subjective:   Patient returns for follow-up.  Heart rates tend to run on the slow side but for now are observing.  Currently medical therapy for heart failure past history ischemic heart disease but without manifestation of angina.  Patient with hepatocellular carcinoma of the liver with considerations for palliative care.                    Past Medical History:   Diagnosis Date     Acute on chronic diastolic congestive heart failure      Acute renal failure      Acute tubular necrosis      Anemia      Asthma       Atherosclerosis of native coronary artery with angina pectoris      Cerebral vascular accident      Cervicalgia      Cholecystitis      Cholelithiasis      Chronic kidney disease      Chronic obstructive pulmonary disease      Coronary artery disease      Coronary artery stenosis      Diabetes      Diabetes      Diabetes mellitus, type II      Dysphagia      GERD (gastroesophageal reflux disease)      Gout      HCC (hepatocellular carcinoma) 8/2/2017     Hepatic cirrhosis      Hepatitis B      High cholesterol      History of pneumonia      Hyperlipidemia      Hypertension      Hypothyroidism      Leukocytosis      MI (myocardial infarction)      Obstructive sleep apnea, adult      Painful swelling of joint      Palpitations      Sepsis      SOB (shortness of breath)      Stroke      Thrombocytopenia      Past Surgical History:   Procedure Laterality Date     ABDOMINAL SURGERY       CATARACT EXTRACTION Right     per pt report     ENTROPIAN REPAIR       EYE SURGERY       LAPAROSCOPIC CHOLECYSTECTOMY N/A 8/24/2015    Procedure: CHOLECYSTECTOMY LAPAROSCOPIC, LYSIS OF ADHESIONS;  Surgeon: Kushal Lilly MD;  Location: [facility] [place];  Service:      FL CORNEAL TRANSPLANT,LAMELLAR      Description: Cornea Transplant;  Recorded: 12/04/2013;     FL CORNEAL TRANSPLANT,LAMELLAR      Description: Cornea Transplant;  Recorded: 12/04/2013;     Allergies   Allergen Reactions     Bee Pollen Anaphylaxis     Anaphylactic reaction 8/2/17 after multiple bee stings     Family History   Problem Relation Age of Onset     Diabetes Mother      Diabetes Brother      Diabetes Sister      Diabetes Sister       Social History     Social History     Marital status:      Spouse name: N/A     Number of children: N/A     Years of education: N/A     Occupational History     Not on file.     Social History Main Topics     Smoking status: Never Smoker     Smokeless tobacco: Never Used     Alcohol use No     Drug use: No     Sexual  "activity: Not on file     Other Topics Concern     Not on file     Social History Narrative         Objective:   /62 (Patient Site: Left Arm, Patient Position: Sitting, Cuff Size: Adult Regular)  Pulse 60  Resp 16  Ht 4' 10\" (1.473 m)  Wt 116 lb (52.6 kg)  BMI 24.24 kg/m2  116 lb (52.6 kg)   Wt Readings from Last 3 Encounters:   06/08/18 116 lb (52.6 kg)   06/06/18 118 lb 3.2 oz (53.6 kg)   05/30/18 117 lb (53.1 kg)     BP Readings from Last 3 Encounters:   06/08/18 130/62   06/06/18 135/72   05/30/18 120/68     Pulse Readings from Last 3 Encounters:   06/08/18 60   06/06/18 65   05/30/18 60     General appearance: alert, appears stated age and cooperative  Head: Normocephalic, without obvious abnormality, atraumatic  Eyes: Normal external exam without jaundice.  Ears: Normal external auricular exam.  Nose: Normal external exam.  Lungs: clear to auscultation bilaterally  Chest wall: no tenderness  Heart: regular rate and rhythm, S1, S2 normal, no murmur, click, rub or gallop heart rate 58  Pulses: 2+ and symmetric  Skin: Skin color, texture, turgor normal.   Neurologic: Grossly normal, no focal neurologic findings.    Review of Systems:   General: WNL  Cardiographics: Reviewed in clinic.    Lab Results:  Lab Results: Personally reviewed  Lab Results   Component Value Date    CHOL 79 08/10/2017    TRIG 89 02/03/2013    HDL 36 (L) 08/10/2017    LDLDIRECT 42 09/13/2017       Clinical evaluation time today including exam 30 minutes.  At least 50% of clinic evaluation time involved in assessment and patient counseling.  Part of this chart was created using a dictation software.  Typographic errors, word substitutions, and grammatical errors may unintentionally occur.    Jv Cancino M.D.  [facility]    "

## 2021-06-18 NOTE — PROGRESS NOTES
In Basket message received for Radiation Oncology Consult, referal received from Dr Chandan Shea. Placed call to Koffi to schedule appointment. Same scheduled for Wednesday June 6th 2018 at 10:00 am with Dr Fraser, with a nurse appt at 9:30 am.     E mail sent to Koffi with Introduction letter, MD bio card, [facility] intake form, medication and allergy list, [facility], and appointment letter.    Work up for Hepatocellular carcinoma with metastasis to bone has been done at [facility] and [facility].   Medical records to ensure patient records in order for appointment.     Explained that I would be Evelio's nurse navigator.  I generally provide assistance with psycho social needs including but not limited to, financial assistance, obtaining education materials, transportation assistance, help with meals, community programs, and help finding support or support groups, getting connected with our psychotherapist when needed.  I am also here to help guide you through our system.      I am in the office Monday thru Friday.  I am in and out of my office throughout the day, If I do not answer my phone please leave me a message and I will get back to you as soon as possible. If you are ever unsure of who to call you can always contact me and I will help assist you in any way that I can.  Medication or symptom management needs typically go through our triage nurse who can be reached by calling the main clinic number. [phone]    Please do not hesitate to contact me if you have any questions or concerns.      Koffi and his father were asked to arrive at 9:30 am at [facility] and to have health history form and medication and allergy list completed prior to arrival and to bring same to appointment.

## 2021-06-18 NOTE — PROGRESS NOTES
RADIATION ONCOLOGY WEEKLY TREATMENT VISIT NOTE      Assessment / Impression       1. 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  All questions and concerns addressed.    Plan:     Continue radiation treatment as prescribed.    Subjective:      HPI: Evelio Odell is a 84 y.o. male with    1. 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    Assessment                  Body Site: Bone Site: l fifth rib  Today's Dose: 900  Total Dose for Bone: 3000  Today's Fraction/Total Fraction Bone: 3/10  Neuropathy - motor: 3: Objective weakness interfering with activities of daily living  Constipation: 0: None  Diarrhea W/O Colostomy: 0: None  Bowel Incontinence: 0: None  Urinary Incontinence: 0: None                                            Sexuality Alteration                 Emotional Alteration Copin: Effective  Comfort Alteration KPS: 50% Requirws considerable assistance and frequent medical care  Fatigue (ONS scale) : 5: Moderate Fatigue  Pain Location: 5th rib  Pain Intensity. Rate degree of pain ranging from 0 (no pain) to 10 (severe pain) : 4  Pain Description: Ache - Muscular type ache  Pain Intervention: 3: Opiods  Effectiveness of pain intervention: 2: Pain relieved 50%   Nutrition Alteration Anorexia: 0: None  Nausea: 0: None  Vomitin: None  Pharynx and Esphogaus: 0: No change over baseline  Skin Alteration Skin Sensation: 0: No problem  Skin Reaction: 0: None  AUA Assessment                                  Accompanied by       Objective:     Exam:     Vitals:    18 0957   BP: 163/72   Pulse: (!) 46   Temp: 97.9  F (36.6  C)   TempSrc: Oral   SpO2: 95%   Weight: 116 lb 1.6 oz (52.7 kg)       Wt  Readings from Last 8 Encounters:   06/11/18 116 lb 1.6 oz (52.7 kg)   06/08/18 116 lb (52.6 kg)   06/06/18 118 lb 3.2 oz (53.6 kg)   05/30/18 117 lb (53.1 kg)   05/23/18 114 lb 14.4 oz (52.1 kg)   05/08/18 123 lb (55.8 kg)   05/03/18 121 lb 8 oz (55.1 kg)   05/02/18 130 lb (59 kg)       General: Alert and oriented, in no acute distress  Evelio has no Erythema.  Aria chart and setup information reviewed    Diana Fraser MD

## 2021-06-19 NOTE — PROGRESS NOTES
07-24-18:   Checked with MOISÉS Sy and MOISÉS Cruz. Dr. Shea admin day is today (Tuesday) and not in clinic.

## 2021-06-19 NOTE — PROGRESS NOTES
"07/24/18 at 9:10AM:   Per [facility] Standard of work, if a pt enroll in someone's care, [facility] Service no longer need to follow up with pt. [facility] CCG consulted with Kathleen CCC nurse regards to this pt status-currently enroll in  Hospice team. [facility] Nurse agreed patient should be un-enroll from [facility] Service per [facility] work standard and let [facility] manage his cares.       07-24-18 at 9:22AM:   [facility] CCG coordinate pt's spouse request with  Hospice team. Called  Hospice team at [phone] and spoke with Kassandra supportive services. Introduced self. Explained reason of calling today. Pt MRN provide. Verified nurse for pt. Relayed Kathleen, pt's spouse request below for a Rx order for Ensure shake send to [facility]. [facility] CCG direct line is given to Kassandra.     Per Kassandra;   -Mehreen Duarte is the nurse for this pt.   -will informed Mehreen regarding request and give you a call back.    9:50AM:   CCG called back and spoke with Kathleen pt's spouse.   Updated spouse there her request have been relayed to  Hospice team for advice. Will keep spouse update when returning call. CCG wish god bless and send thoughts and prayers to your way. Spouse confirmed and thank you. Pt make additional request. Request  Spiritual team to bless and prays for pt at home -\"if possible.\"   [facility] CCG will check with [facility] Spiritual Care Team whether they do home visit or not. Spouse agreed. Kathleen confirmed and no questions.     09:57AM:   Per pt chart reviewed; discharged from [facility] to home.   CCG contact [facility] Spiritual Care Team and no answer. Reached . Relayed pt's spouse request; request for a returning call; CCG name and direct phone # is provide. Provides ana Wang's spouse phone# and speak .comong Language-if call her.     Plan:   -Waiting to hear from  Hospice team, and [facility] Spiritual Care Team.    -Once received returning call from  Hospice team, [facility] [place] [place] will discuss un-enroll patient from [facility] Service with pt's family.       "

## 2021-06-19 NOTE — PROGRESS NOTES
Mehreen, nurse with Hospice Team returning called on 07/25/18 regards to ensure shake. Left message on CG phone. Mehreen's returning phone # is [phone]. End of message.     08/03/18:   CG returning Mehreen's call at phone # provide above; got VM; LM for Mehreen to return called regards to ensure shake request for patient (by spouse). CG phone # is provide.      FYI:   CG have been off since 8/26/18 and returning yesterday, 8/02/18.

## 2021-06-19 NOTE — PROGRESS NOTES
"Patient Outreach Follow Up:   Reviewed pt chart before follow up with pt regards to goal f/u. Noticed patient went on hospice care with [facility].     Called pt and spoke with Kathleen pt's spouse. Confirmed hospice care with [facility]. Spouse confirmed and shared the following. Spouse stated pt is not able to tolerate regular food; able to drink ensure without problem. Pt request Rx order for Ensure shake send to [facility]. Verified son named Koffi. Kathleen confirmed Koffi is son and a  at [employer]. Discussed deleted current goal for pt. Spouse agreed. Goal deleted.     Kathleen, spouse shared:   - discharged from hospital to home with hospice care.   -hospice team provide home service once a week; every Tuesday.   -if Evelio's condition worsen, will contact Hospice team and they will come before the next appt schedule.   - is very weak, tired, fatigue, and weigh decreased.   -Evelio's condition is bed rest at this time. Lay in bed all day. His condition is very fatigue, tired, and worsening. Not able to tolerate regular food. Able to drink ensure shake. Not able to urinate. Catheter placed yesterday, 07-23-18 at home. Families, relatives, and friends are gathering to emotional support and prays for him.   -on 5mg liquid pain killer med for pain. Without pain med, he will cry, scream, and complaints of pain levels.    - not able to urinate. Catheter placed yesterday at home.   -it's very hard for us-families at the moment. Seeing his condition worsening every day. \"I cannot believed it.\"   -will not be able to work on current goal. Request goal delete.     Encouraged spouse and families to stay strong and spend times with patient. Comfort Kathleen. Refer Kathleen and family to seek for more advice with [facility] team, pcp, and [facility] Spiritual Care Dept (if need).   Will check with Dr. Shea, pcp with spouse request for Ensure shake and will get back with her. Spouse request ASAP. "     Deleted goal:   Goal: I would like to increased walking exercise to 3 times a week for 20 minutes at each time for the next one year. (Goal setting date: 8/12/16); (updated: 5/16/18)

## 2021-06-19 NOTE — PROGRESS NOTES
Patient Outreach Follow Up:   F/u with pt and family regards to [facility] outreach and plan from Patient Outreach encounter dated 2018.   Reviewed pt chart. Patient is  and has been removed from [facility] enrolled patient list.    Reason: Call pt's spouse/family to thank them for allowed [facility] Service/Clinic Care Guide to work with patient and sorry for their love ones. No further calls.     Writer contact Kathleen, spouse at phone # [phone] and spoke with Aggie. Asked to speak with Kathleen.   Per Aggie; mother-in-law is away from home at the moment. Can take message.   Confirmed Aggie's relationship to patient and Kathleen. Aggie confirmed daughter-in-law.   Left a thank you and sorry for their love ones message with Aggie. Refer spouse for pt and family to contact [facility] and pcp office for any question or concerns.    Aggie confirmed no questions.

## 2021-06-25 NOTE — PROGRESS NOTES
Progress Notes by Jv Cancino MD at 8/10/2017  9:10 AM     Author: Jv Cancino MD Service: -- Author Type: Physician    Filed: 8/10/2017  9:58 AM Encounter Date: 8/10/2017 Status: Signed    : Jv Cancino MD (Physician)       [facility]   Outpatient Follow-up evaluation.      Current Outpatient Prescriptions:   ?  acetaminophen (TYLENOL) 500 MG tablet, Take 1 tablet (500 mg total) by mouth every 4 (four) hours as needed for pain., Disp: 30 tablet, Rfl: 0  ?  albuterol (PROVENTIL) 2.5 mg /3 mL (0.083 %) nebulizer solution, Take 3 mL (2.5 mg total) by nebulization 4 (four) times a day., Disp: 75 mL, Rfl: 0  ?  allopurinol (ZYLOPRIM) 100 MG tablet, Take 100 mg by mouth daily. TAKE 1 PILL BY MOUTH EVERY DAY/ TXHUA HNUB NOJ 1 LUB PAB JC MOB KO TAW VWM, Disp: , Rfl:   ?  aspirin 81 MG EC tablet, Take 81 mg by mouth daily., Disp: , Rfl:   ?  atorvastatin (LIPITOR) 10 MG tablet, Take 1 tablet (10 mg total) by mouth daily., Disp: 90 tablet, Rfl: 3  ?  budesonide-formoterol (SYMBICORT) 80-4.5 mcg/actuation inhaler, Inhale 2 puffs 2 (two) times a day as needed. , Disp: , Rfl:   ?  CHOLECALCIFEROL 1,000 unit tablet, Take 1,000 Units by mouth daily. , Disp: , Rfl:   ?  diphenhydrAMINE (BENADRYL) 25 mg tablet, Take 1 tablet (25 mg total) by mouth 2 (two) times a day as needed (pruritus, rash)., Disp: 15 tablet, Rfl: 0  ?  dorzolamide-timolol (COSOPT) 22.3-6.8 mg/mL ophthalmic solution, Administer 1 drop to the right eye 2 (two) times a day. , Disp: , Rfl:   ?  gabapentin (NEURONTIN) 100 MG capsule, TAKE 1 PILL BY MOUTH EVERY DAY AT BEDTIME/TXHUA HMO THAUM MUS PW NOJ 1 LUB PAB QHOV JENNY DASH, Disp: 30 capsule, Rfl: 0  ?  GLUCERNA SHAKE Liqd, DRINK 1 CAN BY MOUTH 3 TIMES A DAY FOR MALNUTRITION/ TXHUA LAUREEN HAUS 1 POOM 3 ZAUG, Disp: 94388 mL, Rfl: 11  ?  levothyroxine (SYNTHROID, LEVOTHROID) 50 MCG tablet, Take 50 mcg by mouth Daily at 6:00 am. TAKE 1 PILL BY MOUTH EVERY DAY /JINAA LAUREEN REESE  [facility], Disp: , Rfl:   ?  lisinopril (PRINIVIL,ZESTRIL) 10 MG tablet, Take 10 mg by mouth daily., Disp: , Rfl:   ?  metoprolol succinate (TOPROL-XL) 25 MG, Take 1 tablet (25 mg total) by mouth daily., Disp: 90 tablet, Rfl: 3  ?  miscellaneous medical supply (BLOOD PRESSURE CUFF) Misc, Check blood pressure daily as directed., Disp: 1 each, Rfl: 0  ?  nitroglycerin (NITROSTAT) 0.4 MG SL tablet, Place 0.4 mg under the tongue every 5 (five) minutes as needed for chest pain., Disp: , Rfl:   ?  tenofovir alafenamide (VEMLIDY) 25 mg Tab tablet, Take 25 mg by mouth daily with breakfast. Take with food, Disp: , Rfl:   ?  tiotropium (SPIRIVA) 18 mcg inhalation capsule, Place 18 mcg into inhaler and inhale daily. INHALE THE CONTENTS OF 1 CAPSULE DAILY/ TXHUA HNUB SIV LUB NQUS PA NQUS 1 [facility], Disp: , Rfl:         Evelio MICHELLE Odell is a 83 y.o. Male    Chief Complaint   Patient presents with   ? Consult     cardiac clearance       Diagnoses:  See Problem list   Heart sinus bradycardia on ECG.  Coronary artery disease and history of previous coronary intervention.  Chest pressure discomfort occurring with exertion possible stable angina pectoris.  Hypertension.  Hyperlipidemia.  Liver cancer undergoing assessment for microwave ablation.  Recommendations:    Arrange for 24-hour Holter monitor and a pharmacologic nuclear stress test to reassess for ischemia.  Discontinue metoprolol at this time.  Assess a Holter result to determine whether not a candidate for pacemaker placement.  Check lipid studies today.  Advance atorvastatin from 10-40 given his underlying diabetic status and previous coronary disease.      Subjective:   Patient does not speak English.  Interpretation by relative.  Presents for evaluation of presurgical risk.  Is undergoing a ablation study of the liver lesion presumed to be cancerous.  He apparently has been having recurring chest tightness and discomfort when he exerts himself.  His  activity level is low mostly walking around the home he does no regular walking or exercise.  Some dyspnea with exertion as well.  No orthopnea or PND.  Notes fatigue and weakness.  No dizziness presyncope or actual syncopal events.  No edema.  No intermittent claudications.  No recent neurologic symptoms to suggest TIA or stroke.                    Past Medical History:   Diagnosis Date   ? Acute renal failure    ? Acute tubular necrosis    ? Anemia    ? Asthma    ? Atherosclerosis of native coronary artery with angina pectoris    ? Cervicalgia    ? Cholecystitis    ? Cholelithiasis    ? Chronic kidney disease    ? Chronic obstructive pulmonary disease    ? Coronary artery disease    ? Coronary artery stenosis    ? Diabetes    ? Diabetes    ? Dysphagia    ? GERD (gastroesophageal reflux disease)    ? Gout    ? HCC (hepatocellular carcinoma) 8/2/2017   ? Hepatic cirrhosis    ? Hepatitis B    ? History of pneumonia    ? Hyperlipidemia    ? Hypertension    ? Hypothyroidism    ? Leukocytosis    ? MI (myocardial infarction)    ? Obstructive sleep apnea, adult    ? Painful swelling of joint    ? Palpitations    ? Sepsis    ? SOB (shortness of breath)    ? Stroke    ? Thrombocytopenia      Past Surgical History:   Procedure Laterality Date   ? ABDOMINAL SURGERY     ? CATARACT EXTRACTION Right     per pt report   ? ENTROPIAN REPAIR     ? EYE SURGERY     ? LAPAROSCOPIC CHOLECYSTECTOMY N/A 8/24/2015    Procedure: CHOLECYSTECTOMY LAPAROSCOPIC, LYSIS OF ADHESIONS;  Surgeon: Kushal Lilly MD;  Location: [facility] [place];  Service:    ? [place] CORNEAL TRANSPLANT,LAMELLAR      Description: Cornea Transplant;  Recorded: 12/04/2013;   ? [place] CORNEAL TRANSPLANT,LAMELLAR      Description: Cornea Transplant;  Recorded: 12/04/2013;     Allergies   Allergen Reactions   ? Bee Pollen Anaphylaxis     Anaphylactic reaction 8/2/17 after multiple bee stings     Family History   Problem Relation Age of Onset   ? Diabetes Mother    ? Diabetes  Brother    ? Diabetes Sister    ? Diabetes Sister       Social History     Social History   ? Marital status:      Spouse name: N/A   ? Number of children: N/A   ? Years of education: N/A     Occupational History   ? Not on file.     Social History Main Topics   ? Smoking status: Never Smoker   ? Smokeless tobacco: Never Used   ? Alcohol use No   ? Drug use: No   ? Sexual activity: Not on file     Other Topics Concern   ? Not on file     Social History Narrative     Family history not pertinent to chief complaint or presenting problem    Current Outpatient Prescriptions:   ?  acetaminophen (TYLENOL) 500 MG tablet, Take 1 tablet (500 mg total) by mouth every 4 (four) hours as needed for pain., Disp: 30 tablet, Rfl: 0  ?  albuterol (PROVENTIL) 2.5 mg /3 mL (0.083 %) nebulizer solution, Take 3 mL (2.5 mg total) by nebulization 4 (four) times a day., Disp: 75 mL, Rfl: 0  ?  allopurinol (ZYLOPRIM) 100 MG tablet, Take 100 mg by mouth daily. TAKE 1 PILL BY MOUTH EVERY DAY/ TXHUA HNUB NOJ 1 LUB PAB JC MOB SANAM TAW VWM, Disp: , Rfl:   ?  aspirin 81 MG EC tablet, Take 81 mg by mouth daily., Disp: , Rfl:   ?  atorvastatin (LIPITOR) 10 MG tablet, Take 1 tablet (10 mg total) by mouth daily., Disp: 90 tablet, Rfl: 3  ?  budesonide-formoterol (SYMBICORT) 80-4.5 mcg/actuation inhaler, Inhale 2 puffs 2 (two) times a day as needed. , Disp: , Rfl:   ?  CHOLECALCIFEROL 1,000 unit tablet, Take 1,000 Units by mouth daily. , Disp: , Rfl:   ?  diphenhydrAMINE (BENADRYL) 25 mg tablet, Take 1 tablet (25 mg total) by mouth 2 (two) times a day as needed (pruritus, rash)., Disp: 15 tablet, Rfl: 0  ?  dorzolamide-timolol (COSOPT) 22.3-6.8 mg/mL ophthalmic solution, Administer 1 drop to the right eye 2 (two) times a day. , Disp: , Rfl:   ?  gabapentin (NEURONTIN) 100 MG capsule, TAKE 1 PILL BY MOUTH EVERY DAY AT BEDTIME/TXHUA HMO THAUM MUS PW NOJ 1 LUB PAB QHOV JENNY DASH, Disp: 30 capsule, Rfl: 0  ?  GLUCERNA SHAKE Liqd, DRINK 1 CAN BY MOUTH  3 TIMES A DAY FOR MALNUTRITION/ TXHUA HNUB HAUS 1 POOM 3 ZAUG, Disp: 48462 mL, Rfl: 11  ?  levothyroxine (SYNTHROID, LEVOTHROID) 50 MCG tablet, Take 50 mcg by mouth Daily at 6:00 am. TAKE 1 PILL BY MOUTH EVERY DAY /TXHUA HNUB NOJ 1 LUB TSHUAJ PAB JC THYROID, Disp: , Rfl:   ?  lisinopril (PRINIVIL,ZESTRIL) 10 MG tablet, Take 10 mg by mouth daily., Disp: , Rfl:   ?  metoprolol succinate (TOPROL-XL) 25 MG, Take 1 tablet (25 mg total) by mouth daily., Disp: 90 tablet, Rfl: 3  ?  miscellaneous medical supply (BLOOD PRESSURE CUFF) Misc, Check blood pressure daily as directed., Disp: 1 each, Rfl: 0  ?  nitroglycerin (NITROSTAT) 0.4 MG SL tablet, Place 0.4 mg under the tongue every 5 (five) minutes as needed for chest pain., Disp: , Rfl:   ?  tenofovir alafenamide (VEMLIDY) 25 mg Tab tablet, Take 25 mg by mouth daily with breakfast. Take with food, Disp: , Rfl:   ?  tiotropium (SPIRIVA) 18 mcg inhalation capsule, Place 18 mcg into inhaler and inhale daily. INHALE THE CONTENTS OF 1 CAPSULE DAILY/ TXHUA HNUB SIV LUB NQUS PA NQUS 1 LUB TSHUAJ, Disp: , Rfl:       Objective:   There were no vitals taken for this visit.      Wt Readings from Last 3 Encounters:   08/09/17 111 lb (50.3 kg)   08/03/17 (!) 97 lb 14.2 oz (44.4 kg)   07/07/17 110 lb (49.9 kg)     BP Readings from Last 3 Encounters:   08/09/17 115/42   08/03/17 161/74   07/18/17 130/54     Pulse Readings from Last 3 Encounters:   08/09/17 (!) 54   08/03/17 69   07/07/17 (!) 54     General appearance: alert, appears stated age and cooperative  Head: Normocephalic, without obvious abnormality, atraumatic  Eyes: Normal external exam without jaundice.  Ears: Normal external auricular exam.  Nose: Normal external exam.  Lungs: clear to auscultation bilaterally  Chest wall: no tenderness  Heart: regular rate and rhythm, S1, S2 normal, no murmur, click, rub or gallop marked bradycardia noted on exam with a 1-2 soft systolic ejection murmur.  Pulses: 2+ and symmetric  Skin:  Skin color, texture, turgor normal.   Neurologic: Grossly normal, no focal neurologic findings.    Review of Systems:      Cardiographics: Reviewed in clinic.    Lab Results:  Lab Results: Personally reviewed  Lab Results   Component Value Date     08/03/2017    K 4.7 08/03/2017     (H) 08/03/2017    CO2 20 (L) 08/03/2017    BUN 29 (H) 08/03/2017    CREATININE 1.59 (H) 08/03/2017    CALCIUM 8.2 (L) 08/03/2017     Lab Results   Component Value Date    CKTOTAL 46 04/15/2017    CKMB 5 12/08/2014    TROPONINI <0.01 08/02/2017     Lab Results   Component Value Date    WBC 9.7 08/03/2017    WBC 14.6 (H) 09/03/2015    HGB 13.3 (L) 08/03/2017    HCT 40.7 08/03/2017    MCV 89 08/03/2017     08/03/2017     Lab Results   Component Value Date    CHOL 104 02/02/2013    TRIG 89 02/03/2013    HDL 37 (L) 02/02/2013    LDLDIRECT 71 01/21/2015       Clinical evaluation time today including exam 45 minutes.  At least 50% of clinic evaluation time involved in assessment and patient counseling.  Part of this chart was created using a dictation software.  Typographic errors, word substitutions, and grammatical errors may unintentionally occur.    Jv Cancino M.D.  [facility]

## 2021-07-14 PROBLEM — I25.118 CORONARY ARTERY DISEASE OF NATIVE ARTERY OF NATIVE HEART WITH STABLE ANGINA PECTORIS (H): Status: RESOLVED | Noted: 2017-01-01 | Resolved: 2017-01-01

## 2021-07-14 PROBLEM — E78.00 HYPERCHOLESTEREMIA: Status: RESOLVED | Noted: 2017-01-01 | Resolved: 2017-01-01
